# Patient Record
Sex: MALE | Race: WHITE
[De-identification: names, ages, dates, MRNs, and addresses within clinical notes are randomized per-mention and may not be internally consistent; named-entity substitution may affect disease eponyms.]

---

## 2017-06-04 NOTE — ED
General Adult HPI





- General


Chief complaint: Recheck/Abnormal Lab/Rx


Stated complaint: Fall/weakness


Time Seen by Provider: 06/04/17 15:13


Source: patient, family, RN notes reviewed, old records reviewed


Mode of arrival: wheelchair


Limitations: physical limitation





- History of Present Illness


Initial comments: 





This is an 84-year-old male reevaluation.  Patient here today for evaluation of 

neurological issues at this time.  Urinary retention, back pain, patient denies 

history of back pain or trauma.  Family states patient has had recent weight 

loss


Intent.  No change in his bowel habits.  As far as diarrhea.  The patient has 

had loss of stool, loss of bowel.  The significant and occasional urinary 

retention.  Weakness and right leg, multiple falls but no specific trauma from 

falls and patient denies any pain.





- Related Data


 Home Medications











 Medication  Instructions  Recorded  Confirmed


 


Fluticasone/Salmeterol [Advair 1 puff INHALATION RT-BID 06/04/17 06/04/17





250-50 Diskus]   


 


Ipratropium-Albuterol Nebulize 3 ml INHALATION RT-QID PRN 06/04/17 06/04/17





[Duoneb 0.5 mg-3 mg/3 ml Soln]   











 Allergies











Allergy/AdvReac Type Severity Reaction Status Date / Time


 


No Known Allergies Allergy   Verified 06/04/17 16:23














Review of Systems


ROS Statement: 


Those systems with pertinent positive or pertinent negative responses have been 

documented in the HPI.





ROS Other: All systems not noted in ROS Statement are negative.





Past Medical History


Past Medical History: Asthma


Additional Past Medical History / Comment(s): chronic back problems, PUD


History of Any Multi-Drug Resistant Organisms: None Reported


Past Surgical History: Back Surgery


Past Psychological History: No Psychological Hx Reported


Smoking Status: Never smoker


Past Alcohol Use History: None Reported


Past Drug Use History: None Reported





General Exam


Limitations: physical limitation


General appearance: alert, in no apparent distress


Head exam: Present: atraumatic, normocephalic, normal inspection


Eye exam: Present: normal appearance, PERRL, EOMI.  Absent: scleral icterus, 

conjunctival injection, periorbital swelling


ENT exam: Present: normal exam, mucous membranes moist


Neck exam: Present: normal inspection.  Absent: tenderness, meningismus, 

lymphadenopathy


Respiratory exam: Present: normal lung sounds bilaterally.  Absent: respiratory 

distress, wheezes, rales, rhonchi, stridor


Cardiovascular Exam: Present: regular rate, normal rhythm, normal heart sounds.

  Absent: systolic murmur, diastolic murmur, rubs, gallop, clicks


GI/Abdominal exam: Present: soft, normal bowel sounds.  Absent: distended, 

tenderness, guarding, rebound, rigid


Extremities exam: Present: normal inspection, full ROM, normal capillary 

refill.  Absent: tenderness, pedal edema, joint swelling, calf tenderness


Back exam: Present: normal inspection


Neurological exam: Present: alert, oriented X3, CN II-XII intact


Psychiatric exam: Present: normal affect, normal mood


Skin exam: Present: warm, dry, intact, normal color.  Absent: rash





Course


 Vital Signs











  06/04/17 06/04/17





  14:21 17:00


 


Temperature 97.6 F 97.5 F L


 


Pulse Rate 68 63


 


Respiratory 20 18





Rate  


 


Blood Pressure 102/59 115/60


 


O2 Sat by Pulse 98 98





Oximetry  














EKG Findings





- EKG Comments:


EKG Findings:: EKG shows normal sinus rhythm at 61, , QRS 80, 





Medical Decision Making





- Medical Decision Making





84 male ER for evaluation of weakness, increasing falls, patient appears did 

have urinary retention, UTI and pyelonephritis, patient admitted for IV 

antibiotic treatment





- Lab Data


Result diagrams: 


 06/04/17 15:54





 06/04/17 15:54


 Lab Results











  06/04/17 06/04/17 06/04/17 Range/Units





  15:19 15:54 15:54 


 


WBC    11.5 H  (3.8-10.6)  k/uL


 


RBC    4.24 L  (4.30-5.90)  m/uL


 


Hgb    12.6 L  (13.0-17.5)  gm/dL


 


Hct    38.5 L  (39.0-53.0)  %


 


MCV    90.7  (80.0-100.0)  fL


 


MCH    29.7  (25.0-35.0)  pg


 


MCHC    32.8  (31.0-37.0)  g/dL


 


RDW    13.6  (11.5-15.5)  %


 


Plt Count    427  (150-450)  k/uL


 


Neutrophils %    81  %


 


Lymphocytes %    11  %


 


Monocytes %    5  %


 


Eosinophils %    2  %


 


Basophils %    1  %


 


Neutrophils #    9.3 H  (1.3-7.7)  k/uL


 


Lymphocytes #    1.3  (1.0-4.8)  k/uL


 


Monocytes #    0.5  (0-1.0)  k/uL


 


Eosinophils #    0.2  (0-0.7)  k/uL


 


Basophils #    0.1  (0-0.2)  k/uL


 


PT     (9.0-12.0)  sec


 


INR     (<1.1)  


 


APTT     (22.0-30.0)  sec


 


Sodium     (137-145)  mmol/L


 


Potassium     (3.5-5.1)  mmol/L


 


Chloride     ()  mmol/L


 


Carbon Dioxide     (22-30)  mmol/L


 


Anion Gap     mmol/L


 


BUN     (9-20)  mg/dL


 


Creatinine     (0.66-1.25)  mg/dL


 


Est GFR (MDRD) Af Amer     (>60 ml/min/1.73 sqM)  


 


Est GFR (MDRD) Non-Af     (>60 ml/min/1.73 sqM)  


 


Glucose     (74-99)  mg/dL


 


Calcium     (8.4-10.2)  mg/dL


 


Phosphorus     (2.5-4.5)  mg/dL


 


Magnesium     (1.6-2.3)  mg/dL


 


Total Bilirubin     (0.2-1.3)  mg/dL


 


AST     (17-59)  U/L


 


ALT     (21-72)  U/L


 


Alkaline Phosphatase     ()  U/L


 


Total Creatine Kinase   45 L   ()  U/L


 


CK-MB (CK-2)   1.3   (0.0-2.4)  ng/mL


 


CK-MB (CK-2) Rel Index   2.9   


 


Troponin I   <0.012   (0.000-0.034)  ng/mL


 


Total Protein     (6.3-8.2)  g/dL


 


Albumin     (3.5-5.0)  g/dL


 


Urine Color  Yellow    


 


Urine Appearance  Cloudy    (Clear)  


 


Urine pH  6.0    (5.0-8.0)  


 


Ur Specific Gravity  1.011    (1.001-1.035)  


 


Urine Protein  1+ H    (Negative)  


 


Urine Glucose (UA)  Negative    (Negative)  


 


Urine Ketones  Negative    (Negative)  


 


Urine Blood  Small H    (Negative)  


 


Urine Nitrite  Negative    (Negative)  


 


Urine Bilirubin  Negative    (Negative)  


 


Urine Urobilinogen  3.0    (<2.0)  mg/dL


 


Ur Leukocyte Esterase  Large H    (Negative)  


 


Urine RBC  10 H    (0-5)  /hpf


 


Urine WBC  >182 H    (0-5)  /hpf


 


Urine WBC Clumps  Many H    (None)  /hpf


 


Amorphous Sediment  Rare H    (None)  /hpf


 


Urine Bacteria  Many H    (None)  /hpf














  06/04/17 06/04/17 Range/Units





  15:54 15:54 


 


WBC    (3.8-10.6)  k/uL


 


RBC    (4.30-5.90)  m/uL


 


Hgb    (13.0-17.5)  gm/dL


 


Hct    (39.0-53.0)  %


 


MCV    (80.0-100.0)  fL


 


MCH    (25.0-35.0)  pg


 


MCHC    (31.0-37.0)  g/dL


 


RDW    (11.5-15.5)  %


 


Plt Count    (150-450)  k/uL


 


Neutrophils %    %


 


Lymphocytes %    %


 


Monocytes %    %


 


Eosinophils %    %


 


Basophils %    %


 


Neutrophils #    (1.3-7.7)  k/uL


 


Lymphocytes #    (1.0-4.8)  k/uL


 


Monocytes #    (0-1.0)  k/uL


 


Eosinophils #    (0-0.7)  k/uL


 


Basophils #    (0-0.2)  k/uL


 


PT   13.4 H  (9.0-12.0)  sec


 


INR   1.4  (<1.1)  


 


APTT   25.1  (22.0-30.0)  sec


 


Sodium  138   (137-145)  mmol/L


 


Potassium  4.2   (3.5-5.1)  mmol/L


 


Chloride  106   ()  mmol/L


 


Carbon Dioxide  26   (22-30)  mmol/L


 


Anion Gap  6   mmol/L


 


BUN  25 H   (9-20)  mg/dL


 


Creatinine  0.84   (0.66-1.25)  mg/dL


 


Est GFR (MDRD) Af Amer  >60   (>60 ml/min/1.73 sqM)  


 


Est GFR (MDRD) Non-Af  >60   (>60 ml/min/1.73 sqM)  


 


Glucose  91   (74-99)  mg/dL


 


Calcium  8.3 L   (8.4-10.2)  mg/dL


 


Phosphorus  2.5   (2.5-4.5)  mg/dL


 


Magnesium  1.8   (1.6-2.3)  mg/dL


 


Total Bilirubin  0.7   (0.2-1.3)  mg/dL


 


AST  34   (17-59)  U/L


 


ALT  49   (21-72)  U/L


 


Alkaline Phosphatase  83   ()  U/L


 


Total Creatine Kinase    ()  U/L


 


CK-MB (CK-2)    (0.0-2.4)  ng/mL


 


CK-MB (CK-2) Rel Index    


 


Troponin I    (0.000-0.034)  ng/mL


 


Total Protein  6.5   (6.3-8.2)  g/dL


 


Albumin  2.8 L   (3.5-5.0)  g/dL


 


Urine Color    


 


Urine Appearance    (Clear)  


 


Urine pH    (5.0-8.0)  


 


Ur Specific Gravity    (1.001-1.035)  


 


Urine Protein    (Negative)  


 


Urine Glucose (UA)    (Negative)  


 


Urine Ketones    (Negative)  


 


Urine Blood    (Negative)  


 


Urine Nitrite    (Negative)  


 


Urine Bilirubin    (Negative)  


 


Urine Urobilinogen    (<2.0)  mg/dL


 


Ur Leukocyte Esterase    (Negative)  


 


Urine RBC    (0-5)  /hpf


 


Urine WBC    (0-5)  /hpf


 


Urine WBC Clumps    (None)  /hpf


 


Amorphous Sediment    (None)  /hpf


 


Urine Bacteria    (None)  /hpf














- Radiology Data


Radiology results: report reviewed (CT abdomen and pelvis and lumbar spine 

shows likely L obstruction, urinary, kidney ultrasound ureter BLADDERS 

ULTRASOUND SHOWS NO DEFINITE MASS), image reviewed





Disposition


Clinical Impression: 


 UTI (urinary tract infection), Pyelonephritis





Disposition: ADMITTED AS IP TO THIS [facility]


Condition: Fair


Referrals: 


Nonstaff,Physician [REFERRING] - 1-2 days

## 2017-06-04 NOTE — CT
EXAMINATION TYPE: CT lumbar spine w con

 

DATE OF EXAM: 6/4/2017

 

COMPARISON: NONE

 

HISTORY: Weight loss and inability to urinate

 

CT DLP: 474.7 mGycm

Automated exposure control for dose reduction was used.

 

CONTRAST: 

CT scan of the lumbar is performed with IV Contrast, patient injected with 100 mL of Omnipaque 300.

 

Enhanced CT of the lumbar spine was performed.  Bone and soft tissue window settings are submitted as
 well as coronal and sagittal reconstructions. 

 

There is laminectomy defect at L3-L4. There is spondylolysis of L5 bilaterally. I see no compression 
fracture. There is severe narrowing of the L2-3 disc space with sclerosis and subchondral cystic paredes
ge. There is extensive spur formation anteriorly at L3-4. I see no focal bone destruction. There is n
o lumbar paraspinal mass. There are a few bilateral renal cortical cysts that measure up to 2.3 cm. T
he sacroiliac joints appear intact. I see no pathologic enhancement. There are posterior disc herniat
ions noted at L3-4 and L4-5 with encroachment on the spinal canal. L4-5 disc herniation is more to th
e right side. There is some spinal stenosis at L2-3 due to the spur formation and facet arthropathy.

IMPRESSION:

Spondylotic changes that are much worse at L2-3. Previous surgery. Moderate sized posterior disc inna
iations at L3-4 L4-5. No fracture. L5 spondylolysis. There is lateral recess stenosis and relative sp
inal stenosis at L5-S1 due to facet arthropathy. There is mild spinal stenosis also at L2-3.

## 2017-06-04 NOTE — US
EXAMINATION TYPE: US renals and bladder

 

DATE OF EXAM: 6/4/2017

 

COMPARISON: NONE

 

CLINICAL HISTORY: Pain.

 

EXAM MEASUREMENTS:

 

Right Kidney:  12.1 x 4.4 x 4.9 cm

Left Kidney: 12.6 x 5.0 x 4.8 cm

 

 

 

Right Kidney: Dilated right renal pelvis possible hydronephrosis, 1 anechoic area mid pole likely cys
t, 1 complex area inferior pole with cystic and solid components  

Left Kidney: Echogenic area seen mid pole with twinkle artifact ?nephrolithiasis  

Bladder: appears wnl

**Bilateral Jets seen: yes

 

Possible evidence for hydronephrosis on the right renal. Appears there may be small nephrolithiasis i
n the left renal.  Two areas are identified on the right renal one appearing anechoic the other compl
ex with cystic and solid components.  The urinary bladder is anechoic.  Bilateral ureteral jets are s
een.

 

 

 

IMPRESSION:

There is a 1.9 cm complex cyst on the lower pole of the right kidney. Urinary bladder is sonolucent. 
There is no evidence of renal obstruction. There are bilateral ureteral jets. Nonobstructing left riana
al calculus. I do not see definite solid suspicious renal mass and I think that this could be followe
d conservatively with repeat ultrasound in 6 months. The heterogeneous enhancement pattern seen on th
e CT scan today in the renal cortex is suggestive of pyelonephritis.

## 2017-06-04 NOTE — CT
EXAMINATION TYPE: CT abdomen pelvis w con

 

DATE OF EXAM: 6/4/2017 5:54 PM

 

COMPARISON: NONE

 

HISTORY: Weight loss and inability to urinate

 

CT DLP: 474.7 mGycm

Automated exposure control for dose reduction was used.

 

TECHNIQUE:  Helical acquisition of images was performed from the lung bases through the pelvis.

 

CONTRAST: 

Performed without Oral Contrast and with IV Contrast, patient injected with 100 mL of Omnipaque 300.

 

FINDINGS: 

 

The lung bases are clear. There is no pleural effusion. There is no pericardial effusion.

 

Liver spleen pancreas gallbladder appear normal. Bile ducts are not dilated. There is no adrenal mass
. There are multiple rounded low-density areas in both kidneys that measure up to 2.5 cm related to m
ultiple cortical cysts. There is an intermediate low-density 2.5 cm area on the lower pole left kidne
y. There is slight decreased cortical enhancement on the lower pole right kidney and lower lateral po
le left kidney. There is no hydronephrosis. There is no retroperitoneal adenopathy. There is no ascit
es. I see no intestinal wall thickening. There are no dilated loops.

 

There is a dilated urinary bladder that measures 15 cm in height. There are prostatic implants noted.
 There are changes in the lumbar spine described in the CT lumbar spine report today.

 

I see no pelvic mass.:

 

IMPRESSION: 

DILATED URINARY BLADDER CONSISTENT WITH BLADDER OUTLET OBSTRUCTION. HETEROGENEOUS RENAL ENHANCEMENT I
N THE CORTEX OF THE LOWER POLE OF BOTH KIDNEYS AND TO A LESSER EXTENT THE UPPER POLES OF BOTH KIDNEYS
 BUT COULD RELATE TO PYELONEPHRITIS. MULTIPLE BILATERAL RENAL CORTICAL CYSTS. A SOLID RENAL MASS GUILLE
OT BE ENTIRELY EXCLUDED.

 

BILATERAL RENAL ULTRASOUND IS RECOMMENDED TO CONFIRM RENAL CYSTS AND EXCLUDE A SOLID RENAL MASS.

## 2017-06-06 NOTE — XR
EXAMINATION TYPE: XR chest 2V

 

DATE OF EXAM: 6/6/2017

 

COMPARISON: CT abdomen pelvis 6/4/2017

 

HISTORY: Rehabilitation placement, extended-care facility placement

 

TECHNIQUE:  Frontal and lateral views of the chest are obtained on 3 images.

 

FINDINGS:  There is no focal air space opacity, pleural effusion, or pneumothorax seen.  The cardiac 
silhouette size is within normal limits. Scattered granulomas are present. Prominent lung lines to be
 indicative of COPD. The osseous structures are intact.

 

IMPRESSION:  No acute cardiopulmonary process. Probable old granulomatous disease.

## 2017-06-06 NOTE — PN
DATE OF SERVICE: 06/06/2017



This 84 -year-old gentleman who was admitted with UTI with possible 

sepsis also had change in mental status. The patient was confused and 

complains of weakness and falls, stress incontinence and other issues 

prior to admission.  Currently the patient is feeling better.  Still 

appetite is very poor.   White count is elevated. Yesterday's white 

count was 11.5, today it is 13.9. The urine culture showed gram 

negative bacilli.    Final report is pending. PSA was negative.  



PAST MEDICAL HISTORY: Reviewed. 



REVIEW OF SYSTEMS: 

CARDIOVASCULAR:  No angina or palpitations. 

RESPIRATORY: As mentioned earlier. 

GI: No nausea or vomiting. 

: No dysuria. Nervous system: No numbness or weakness.  



Current medications are reviewed and include: 

1. DuoNeb q.i.d. and p.r.n. 

2. Symbicort 160/4.5.

3. Rocephin.

4. Lovenox.

5. Paxil. 



PHYSICAL EXAMINATION: Patient is alert and oriented times two.  Pulse 

62, blood pressure 111/54 mg.  respiratory  rate 16.  Temperature 

97.7.   Pulse ox 97% on room air.   

HEENT: Conjunctivae normal. 

NECK: No jugular venous distention. 

CARDIOVASCULAR: S1, S2 muffled.

RESPIRATORY: Breath sounds diminished at the bases.   Scattered 

rhonchi and crackles.  

ABDOMEN: The abdomen is soft and nontender. No mass palpable. Legs: No 

edema. No swelling. Nervous system: Higher functions as mentioned 

earlier.  Moves all four limbs. Mild diffuse weakness.  

LYMPHATICS: No lymph nodes palpable in the neck, axillae or groin.  

SKIN: No ulcer, rash, bleeding. 



LABS: WBC 13.9, hemoglobin 10.6, sodium 138.  Potassium 4.1.  Calcium 

8.  



ASSESSMENT:

1. Urinary tract infection with possible sepsis. 

2. Change in mental status, metabolic encephalopathy, secondary to 

urinary tract infection. 

3. Generalized gait dysfunction and asthenia, possibly secondary to 

sepsis.  

4. Increased WBC. 

5. Anemia, normocytic anemia of chronic disease. 

6. Dehydration, present on admission with diminished p.o. intake. 

7. History of asthma. 

8. History of degenerative joint disease. 

9. History of back surgery. 

10. Mild protein calorie malnutrition with a BMI of 19. 

11. Full code. 



RECOMMENDATIONS AND DISCUSSION:  In this 84 -year-old gentleman who 

presented with multiple complex medical issues, we will monitor the 

patient closely, continue the current medications, continue with 

symptomatic treatment. I would recommend broad spectrum IV 

antibiotics. Follow the cultures.  Bronchodilators. DVT prophylaxis. 

Otherwise, PT, OT evaluation.  Possible ECF rehab.  Guarded prognosis 

because of the multiple complex medical issues. Further 

recommendations to follow. Repeat labs are noted. See orders for 

further details. The patient also apparently had poor score on the 

dementia testing, I would recommend to treat the infection and control 

the septic process and repeat dementia evaluation later.  Otherwise, 

continue to monitor. Further recommendations to follow.  Prognosis 

guarded.  Discussed with the family. Discussed with the patient.

## 2017-06-07 NOTE — PN
DATE OF SERVICE: 06/07/2017



This 84-year-old gentleman who was admitted with urinary tract 

infection with sepsis also had ESBL E. coli from the culture. No chest 

pain or palpitation. No fever.  Patient complained of tiredness, 

weakness.  The patient mildly confused. 



On exam, pulse 61, blood pressure 107/59, respiratory  rate 16, 

temperature  97.9, pulse ox 92% on room air.  

HEENT: Conjunctivae normal. 

NECK: No jugular venous distention.

CARDIOVASCULAR: S1, S2 muffled.  

RESPIRATORY: Breath sounds diminished at the bases. A few scattered 

rhonchi.  No crackles.  

ABDOMEN: Soft, nontender. Legs:  No edema. No swelling. 

CENTRAL NERVOUS SYSTEM: Mild diffuse weakness. 



LABS: WBC 10.5, hemoglobin 10.8. Culture noted. 



ASSESSMENT:

1. Urinary tract infection with possible sepsis ESBL Escherichia coli.

2. Change in mental status, metabolic encephalopathy sepsis secondary 

to urinary tract infection.  

3. Generalized gait dysfunction, asthenia,  possibly secondary to 

sepsis.  

4. Increased WBC. 

5. Anemia, normocytic anemia of chronic disease. 

6. Dehydration, present on admission with diminished p.o. intake. 

7. History of asthma. 

8. History of degenerative joint disease. 

9. History of back surgery. 

10. Mild protein calorie malnutrition with body mass index 19. 

11. FULL CODE. 



RECOMMENDATIONS AND DISCUSSION. Continue the current medications, 

continue with monitoring, symptomatic treatment.  Otherwise at this 

time, I recommend continue current medications, continue symptomatic 

treatment, dietary evaluation, continue with antibiotics, 

bronchodilators.  Infectious disease evaluation, Ertapenem has been 

initiated. Further recommendations to follow.

## 2017-06-08 NOTE — CONS
DATE OF CONSULTATION:  06/08/2017 



REASON FOR CONSULTATION: Urinary retention. 



The patient is an 84-year-old male admitted on 6/6 for treatment of a 

urinary tract infection and back pain. A CT scan of the lumbosacral 

spine showed degenerative changes but no spinal cord compression. CT 

scan on 6/4 showed evidence of a complex 1.9 cm cyst in the lower pole 

of the right kidney, a non-obstructive left renal calculus and a 

distended bladder. A renal ultrasound performed the same day showed no 

other significant abnormality. A urine culture from 6/4 grew E coli 

which was resistant to multiple antibiotics. Patient was initially 

started on Rocephin and has been switched to Ertapenem. A bladder scan 

was performed earlier today and reportedly showed over 700 mL. The 

patient has a history of difficulty in placement of a Anderson catheter, 

and urologic consultation was requested.  



The patient is not a particularly accurate historian and is somewhat 

confused at times. He apparently has a history of prostate cancer 

treated with brachytherapy 20 years ago. One or 2 years ago he had 

back surgery and there was difficulty in placement of a Anderson catheter 

at that time. Patient was incontinent of urine prior to admission. He 

did have malodorous urine, which has improved since admission. He has 

had no recent gross hematuria.  



Patient's past medical history is significant in regard to asthma and 

chronic back pain.  



Current medications include: 

1. DuoNeb. 

2. Symbicort. 

3. Aricept. 

4. Lovenox. 

5. Protonix. 

6. Paxil. 

7. Ertapenem. 



The patient has previously undergone surgery on his lower back. 



REVIEW OF SYSTEMS: Significant mainly in regard to the above. Patient 

denies any shortness of breath or abdominal pain at the present time.  



Physical exam reveals an elderly male who has a temperature of 99.5, 

blood pressure 104/57.  

HEENT: No supraclavicular or cervical adenopathy. 

CHEST: Breathing is unlabored. 

ABDOMEN: There is some slight fullness in the suprapubic area to 

palpation. No pain in this region.  

GENITALIA: Patient is in diapers and is incontinent. Penis is 

uncircumcised. There is no urethral discharge or blood noted at the 

urethral meatus. Both testicles are descended. No hernia is present.  

RECTAL: A large external hemorrhoid is present. Prostate is relatively 

flat and consistent with previous radiation therapy. Anal sphincter 

tone is reduced. There is no evidence of fecal impaction.  



Laboratory evaluation on admission included a white blood count of 

11,500. BUN 25, creatinine 0.84. White blood count today was 12,300. 

BUN 15, creatinine 0.86.  



Immediately following my examination I attempted to pass a 16 Qatari 

Anderson catheter using sterile technique, but this met with obstruction 

in the region of the bulbomembranous junction. A 12 Qatari Coude 

catheter was also tried, without success. A 5 Qatari filiform was 

passed through the urethra and into the bladder, and a urethral 

stricture was then dilated from 10 to 14 Qatari using followers. A 

0.035 Glidewire was passed through a follower and into the bladder. 

The 12 Qatari Coude catheter was modified with a slit at its tip and 

advanced over the Glidewire and into the bladder. Clear urine drained 

from the bladder.  



IMPRESSION: 

1. Incomplete bladder emptying secondary to urethral stricture. 

2. Recent multi-drug-resistant Escherichia coli urinary tract 

infection versus contaminated urine related to chronic incontinence.  



RECOMMENDATION: Patient's catheter should be left in place for the 

time being until his infection has been treated. A repeat urine 

culture will be obtained from urine obtained through the catheter.  



Thank you for allowing me to participate in the care of this gentleman.

## 2017-06-08 NOTE — CONS
DATE OF CONSULTATION:  



DATE OF SERVICE:  06/07/2017



REASON FOR CONSULTATION: ESBL Escherichia coli urinary tract infection. 



HISTORY OF PRESENT ILLNESS: The patient is an 84-year-old  

male who has been admitted to [facility] and 

presented on 6/4/2017 predominantly with neurological symptoms. The 

patient apparently did have a fall on Saturday. Prior to that, has 

been complaining of some back pain. The patient also noticed to have 

bowel incontinence and as well as foul smelling urine. With these 

symptoms, the patient has been evaluated by the ER physician. The 

patient did have CT of the abdomen and pelvis, which did show 

possibility of distention of the bladder and possible renal cysts. A 

renal ultrasound confirmed those cysts, one of them was complex. 

Patient did have a UA that was significantly positive.  He has been 

treated with Rocephin with the urine culture coming back positive for 

an ESBL E. coli. Hence ID was consulted for further recommendation. 

Patient is not a very good historian when asked she says everything is 

right okay with me and denies having any significant pain or shortness 

of breath or any cough or any abdominal pain and no diarrhea. Overall 

history remains to be limited so most of the information has been 

obtained from review of the chart.  



REVIEW OF SYSTEMS:  Could not be reliably obtained. The positives have 

been mentioned in the HPI.  



PAST MEDICAL HISTORY: Significant for asthma, chronic back pain and 

possibility of dementia.  



PAST SURGICAL HISTORY: Back surgery. 



SOCIAL HISTORY: No history of smoking, drinking or drug use. 



FAMILY HISTORY: Positive for MI. 



ALLERGIES: No known drug allergies. 



Medications currently include the patient is on DuoNeb, Symbicort, 

Aricept Lovenox, folic acid, Theragran, Protonix, Paxil and vitamin 

B1.  



On examination, blood pressure is 125/65 with a pulse of 61, 

temperature 97.5.  He is 97% on room air. General description is an 

elderly male, lying in bed in no distress. No tachypnea or accessory 

muscle of respiration use.  

HEENT examination shows pallor. No scleral icterus. Oral mucosal 

membranes dry.  

NECK: Trachea central, no thyromegaly. 

LUNGS: Unlabored breathing. Clear to auscultation anteriorly. No 

wheeze or crackle.  

HEART: S1, S2. Regular rate and rhythm. 

ABDOMEN:  Soft, no tenderness. No guarding or rigidity. 

EXTREMITIES: No edema of the feet. 

SKIN EXAMINATION:  No rash or mass palpable. 

NEUROLOGICAL: The patient is awake, alert, oriented x3. Mood and 

affect normal.  



LABS: Hemoglobin 10.8, white count 10.7.  Admission white count is 

11.5.  BUN of 14 with creatinine 0.82. UA admission was large 

leukocyte esterase with more than 182 WBCs. Blood culture has been 

negative. Urine treated with an ESBL E. coli. CT and ultrasound report 

as mentioned above.  



DIAGNOSTIC IMPRESSION AND PLAN: Patient with ESBL Escherichia coli 

positive urine culture in a patient who did have significant retention 

of urine with cloudy, smelly urine on presentation likely symptomatic 

urinary tract infection.  patient has not  received antibiotics or 

exposure that has put him a risk ESBL infection.  



PLAN: 

1. We will repeat a clean catch UA and cultures before commiting  to 

long term IV antibiotics.  

2. Will discontinue the Rocephin, start the patient on Invanz 1 gram 

IV daily while awaiting for repeat urine culture to finalize.  

3. Will follow up on his clinical condition and cultures to further 

adjust the medication if needed.  



Thank you for this consultation.  Will follow this patient along with 

you.  



ZAYNAB

## 2017-06-08 NOTE — PN
DATE OF SERVICE: 06/08/2017



This 84-year-old gentleman admitted with UTI with ESBL E. coli, also 

had significant post void residual indicating urinary outlet 

obstruction. The patient is also running some fever. The patient is on 

ertapenem. PICC line has been recommended by Dr. John of infectious 

disease. No chest pain or palpitations. 



On exam, alert and oriented x3. Pulse 67, blood pressure 180/58, 

respirations 19, temperature 98.1, T-max 100 degrees, pulse ox 96% on 

room air. 

HEENT: Conjunctivae normal.

NECK: No jugular venous distension.

CARDIOVASCULAR: S1 and S2 muffled.

RESPIRATORY: Breath sounds diminished in the bases. No rhonchi. No 

crackles.  

ABDOMEN: Soft, nontender.

LEGS: No edema.

NERVOUS SYSTEM: Nonfocal.



LABS: WBC 12.6, hemoglobin is 11.2. UA noted. 



ASSESSMENT:

1. Urinary tract infection, possibly extended-spectrum beta-lactamase 

Escherichia coli.  

2. Urinary outlet obstruction with a high residual volume. 

3. Change in mental status, metabolic encephalopathy and sepsis 

secondary to urinary tract infection, present on admission.  

4. Generalized gait dysfunction asthenia, possibly secondary to sepsis. 

5. Increased WBC. 

6. Anemia, normocytic anemia of chronic disease. 

7. Dehydration, present on admission with diminished p.o. intake. 

8. History of asthma. 

9. Asthma, chronic intermittent. 

10. History of degenerative joint disease. 

11. History of back surgery. 

12. Mild protein-calorie malnutrition with a body mass index of 19. 

13. FULL CODE.



RECOMMENDATIONS AND DISCUSSION: In this 84-year-old gentleman who 

presented with multiple complex medical issues, we will monitor the 

patient closely. Continue the current medications, continue with 

symptomatic treatment. Otherwise, continue with antibiotics. Possible 

PICC line. PT and OT evaluation. Possible ECF rehab. Guarded 

prognosis. Urology evaluation. Further recommendations to follow.

## 2017-06-09 NOTE — DS
DATE OF ADMISSION:   06/06/2017

DATE OF DISCHARGE:   



DATE OF SERVICE: 06/09/2017 



FINAL DIAGNOSES: 

1. Urinary tract infection with ESBL Escherichia coli with sepsis, 

present on admission.  

2. Urinary outlet obstruction with possible urethral stricture with 

high-residual volume on Anderson catheter.  

3. Change in mental status, metabolic encephalopathy with sepsis, 

present on admission, secondary to urinary tract infection.  

4. Generalized gait dysfunction, asthenia. 

5. Increased white count. 

6. Anemia, normocytic; anemia of chronic disease. 

7. Dehydration, present on admission, with diminished oral intake. 

8. History of chronic intermittent asthma. 

9. History of degenerative joint disease. 

10. History of back surgery. 

11. History of mild protein-calorie malnutrition with a body mass 

index of 19.  

12. Status post PICC line.

13. FULL CODE. 



DISCHARGE DISPOSITION:  The patient will be discharged in stable 

condition with guarded prognosis. Total time taken 35 minutes.  



HISTORY OF PRESENT ILLNESS: This 84-year-old gentleman with a past 

medical history of multiple medical problems was admitted with UTI 

with ESBL E coli. The patient also had features of urinary outlet 

obstruction. Patient had change in mental status. He was treated with 

antibiotics. ESBL was grown from the culture. A PICC line was 

inserted. Dr. John recommended IV antibiotics and the patient 

improved significantly. Multiple consultants, including Dr. Mullen and 

Dr. Gamez, also saw the patient; recommended outpatient followup.  



On exam, vitals are stable. 

CARDIOVASCULAR SYSTEM: S1, S2 muffled. 

ABDOMEN: Soft. 

NERVOUS SYSTEM: No focal deficit. 



Dr. Mullen recommended that the catheter remain in place at least one 

week. Bladder scan once the catheter is removed, and periodically for 

the next week.  



Discharge advice and medications are: 

1. Diet is cardiac. 

2. Follow up with Dr. Black in 2 to 3 days after discharge from [facility]. 

3. Follow up with Dr. Motley and Dr. Mullen as recommended. 

4. Symbicort 160/4.5 two puffs b.i.d. 

5. Invanz 1 gram IV daily per Dr. John. 

6. Folic acid 1 mg daily. 

7. Albuterol Atrovent updrafts q.i.d. and q.4 p.r.n. 

8. Multivitamins 1 p.o. daily. 

9. Protonix 40 mg daily. 

10. Paxil 10 mg p.o. daily. 

11. Thiamine 100 mg p.o. daily. 



Once again, the patient will be discharged in stable condition with 

guarded prognosis.

## 2017-06-09 NOTE — PN
DATE OF SERVICE: 06/09/2017



REASON FOR FOLLOWUP: ESBL E coli urinary tract infection. 



INTERVAL HISTORY: The patient is afebrile. He is feeling better, 

breathing comfortably. Denies significant chest pain or cough. No 

abdominal pain. Did have Anderson catheter placed by Urology yesterday. 

On examination, blood pressure 105/60 with a pulse of 71, temperature 

97.7. He is 94% on room air. General description is an elderly male 

lying in bed in no distress.  

RESPIRATORY SYSTEM: Unlabored breathing. Clear to auscultation 

anteriorly.  

HEART: S1, S2. Regular rate and rhythm. 

ABDOMEN: Soft. No tenderness. 



LABS: Hemoglobin 9.3, white count 14. 9 with BUN of 15, creatinine 

0.83.  



DIAGNOSTIC IMPRESSION AND PLAN: Patient has ESBL urinary tract 

infection (      ) for which the patient is continued on IV Invanz for 

another 12 days with outpatient followup. Plan of care discussed with 

the attending. Continue supportive care.

## 2017-06-09 NOTE — P.PN
Progress Note - Text





The patient is afebrile.  He says that he has been comfortable with his Anderson 

catheter.  Urine draining from the catheter is clear with some sediment.  Urine 

culture was obtained at the time of placement of the catheter yesterday but the 

results are pending.  The patient is scheduled to undergo PICC line placement 

for IV antibiotics following discharge.  It is anticipated that he will be 

going to a rehab unit.





Impression: Urethral stricture and urinary tract infection-post urethral 

dilation and placement of Anderson catheter.





Recommendation: The patient's catheter should be left in place for 1 week.  The 

patient should have bladder scans done once his catheter is removed and then 

periodically for the next week.  I would like to see the patient back in the 

office within 1 week following removal of the catheter as it is likely he will 

require periodic urethral dilations due to his stricture.  Have discussed this 

with the patient's daughter.

## 2017-06-09 NOTE — PN
DATE OF SERVICE: 06/08/2017



REASON FOR FOLLOWUP: ESBL C. coli urinary tract infection.



INTERVAL HISTORY: The patient is afebrile. The patient is afebrile. 

The patient did have evidence of urinary retention, high postvoid 

residual, though the patient denies significant symptoms. Denies any 

chest pain. No cough. No abdominal pain or any diarrhea. 



On examination, blood pressure 104/57 with a pulse of 61, temperature 

99.5. He is 98% on room air. General description is an elderly male, 

lying in bed in no distress.  

RESPIRATORY SYSTEM: Unlabored breathing. Clear to auscultation 

anteriorly.  

HEART: S1, S2. Regular rate and rhythm. 

ABDOMEN: Soft. No tenderness. 



LABS: Hemoglobin is 11.1, white count 12.2 with a BUN of 15, 

creatinine 0.86.  



DIAGNOSTIC IMPRESSION ANDP TAE: Patient with an extended-spectrum 

beta-lactamase Escherichia coli urinary tract infection and 

complicated urinary tract infection. Patient did have evidence of 

urinary retention, positive benign prostatic hypertrophy. At this 

time, will continue the patient on Invanz 1 g daily. Will obtain a 

PICC line, as patient is likely to continue with outpatient antibiotic 

therapy. Plan discussed in detail with the patient's family.  



ZAYNAB

## 2017-06-20 NOTE — IR
EXAMINATION TYPE: IR cvc insert >=5 years

 

DATE OF EXAM: 6/20/2017

 

COMPARISON: NONE

 

CLINICAL HISTORY: Infection Needs long-term intravenous access for antibiotics.

 

PROCEDURE:

After informed consent, the skin overlying the upper extremity vein was localized with ultrasound and
 noted to be compressible and patent.  An ultrasound image was obtained and submitted on the patient'
s chart.  The overlying skin was prepped and draped and Lidocaine was used for local anesthesia.  A s
kin nick was made with a scalpel.  Access was gained to the vein under ultrasound guidance with a 21 
gauge needle and a 0.018 inch wire was advanced.  Access site was dilated with Peel-Away sheath and c
atheter tailored to the appropriate length and advanced such that the distal tip is at the cavoatrial
 junction.  Spot image was obtained verifying placement.  Catheter was fixed to the skin with suture 
and a sterile dressing was placed following hemostasis.  Catheter was aspirated and flushed with sali
ne.  Patient was discharged in stable condition without complication. Maximal barrier technique is ut
ilized.  Ultrasound image is documented on the chart. Ultrasound used with sterile technique. 

 

Fluoro time and fluoroscopic images submitted to document procedure: 94 intraoperative C-arm images, 
0.3 minutes fluoroscopy time

 

IMPRESSION: STATUS POST ULTRASOUND AND FLUOROSCOPIC GUIDED PICC LINE PLACEMENT, READY FOR USE.  THIS 
PROCEDURE WAS PERFORMED BY THE UNDERSIGNED.

## 2017-07-10 ENCOUNTER — HOSPITAL ENCOUNTER (OUTPATIENT)
Dept: HOSPITAL 47 - LABWHC1 | Age: 82
Discharge: HOME | End: 2017-07-10
Payer: MEDICARE

## 2017-07-10 DIAGNOSIS — R53.83: ICD-10-CM

## 2017-07-10 DIAGNOSIS — Z01.810: Primary | ICD-10-CM

## 2017-07-10 DIAGNOSIS — Z79.899: ICD-10-CM

## 2017-07-10 DIAGNOSIS — Z01.812: ICD-10-CM

## 2017-07-10 DIAGNOSIS — N35.014: ICD-10-CM

## 2017-07-10 LAB
ANION GAP SERPL CALC-SCNC: 10 MMOL/L
BASOPHILS # BLD AUTO: 0.1 K/UL (ref 0–0.2)
BASOPHILS NFR BLD AUTO: 1 %
BUN SERPL-SCNC: 20 MG/DL (ref 9–20)
CALCIUM SPEC-MCNC: 8.9 MG/DL (ref 8.4–10.2)
CH: 29.7
CHCM: 33.2
CHLORIDE SERPL-SCNC: 109 MMOL/L (ref 98–107)
CO2 SERPL-SCNC: 25 MMOL/L (ref 22–30)
EOSINOPHIL # BLD AUTO: 0.4 K/UL (ref 0–0.7)
EOSINOPHIL NFR BLD AUTO: 6 %
ERYTHROCYTE [DISTWIDTH] IN BLOOD BY AUTOMATED COUNT: 4.04 M/UL (ref 4.3–5.9)
ERYTHROCYTE [DISTWIDTH] IN BLOOD: 14.7 % (ref 11.5–15.5)
GLUCOSE SERPL-MCNC: 102 MG/DL (ref 74–99)
HCT VFR BLD AUTO: 36.3 % (ref 39–53)
HDW: 2.96
HGB BLD-MCNC: 12.2 GM/DL (ref 13–17.5)
LUC NFR BLD AUTO: 2 %
LYMPHOCYTES # SPEC AUTO: 1.2 K/UL (ref 1–4.8)
LYMPHOCYTES NFR SPEC AUTO: 19 %
MCH RBC QN AUTO: 30.2 PG (ref 25–35)
MCHC RBC AUTO-ENTMCNC: 33.6 G/DL (ref 31–37)
MCV RBC AUTO: 89.9 FL (ref 80–100)
MONOCYTES # BLD AUTO: 0.5 K/UL (ref 0–1)
MONOCYTES NFR BLD AUTO: 7 %
NEUTROPHILS # BLD AUTO: 4.3 K/UL (ref 1.3–7.7)
NEUTROPHILS NFR BLD AUTO: 65 %
NON-AFRICAN AMERICAN GFR(MDRD): >60
POTASSIUM SERPL-SCNC: 4.1 MMOL/L (ref 3.5–5.1)
SODIUM SERPL-SCNC: 144 MMOL/L (ref 137–145)
WBC # BLD AUTO: 0.13 10*3/UL
WBC # BLD AUTO: 6.6 K/UL (ref 3.8–10.6)
WBC (PEROX): 7.2

## 2017-07-10 PROCEDURE — 85025 COMPLETE CBC W/AUTO DIFF WBC: CPT

## 2017-07-10 PROCEDURE — 80048 BASIC METABOLIC PNL TOTAL CA: CPT

## 2017-07-12 ENCOUNTER — HOSPITAL ENCOUNTER (OUTPATIENT)
Dept: HOSPITAL 47 - OR | Age: 82
Discharge: NURSING, REHAB OR LONG-TERM CARE | End: 2017-07-12
Payer: MEDICARE

## 2017-07-12 VITALS — DIASTOLIC BLOOD PRESSURE: 56 MMHG | RESPIRATION RATE: 16 BRPM | SYSTOLIC BLOOD PRESSURE: 129 MMHG

## 2017-07-12 VITALS — TEMPERATURE: 97.7 F

## 2017-07-12 VITALS — HEART RATE: 43 BPM

## 2017-07-12 VITALS — BODY MASS INDEX: 19 KG/M2

## 2017-07-12 DIAGNOSIS — J45.909: ICD-10-CM

## 2017-07-12 DIAGNOSIS — R33.9: ICD-10-CM

## 2017-07-12 DIAGNOSIS — M21.371: ICD-10-CM

## 2017-07-12 DIAGNOSIS — Z79.899: ICD-10-CM

## 2017-07-12 DIAGNOSIS — Z92.3: ICD-10-CM

## 2017-07-12 DIAGNOSIS — N35.014: Primary | ICD-10-CM

## 2017-07-12 DIAGNOSIS — Z79.51: ICD-10-CM

## 2017-07-12 DIAGNOSIS — Z85.46: ICD-10-CM

## 2017-07-12 DIAGNOSIS — Z87.440: ICD-10-CM

## 2017-07-12 PROCEDURE — 52281 CYSTOSCOPY AND TREATMENT: CPT

## 2017-07-12 NOTE — P.OP
Date of Procedure: 07/12/17


Preoperative Diagnosis: 


Incomplete bladder emptying secondary to urethral stricture


Postoperative Diagnosis: 


Incomplete bladder emptying secondary to urethral stricture and/or bladder neck 

contracture


Procedure(s) Performed: 


Cystoscopy with dilation of urethral stricture using filiforms and followers 

and placement of Anderson catheter


Implants: 





Anesthesia: MAC


Pathology: none sent


Disposition: same day


Indications for Procedure: 


The patient is an 84-year-old male with a history of prostate cancer treated 

with brachytherapy.  He has a history of incomplete bladder emptying and has 

required urethral dilations in the past.  One month ago he developed urinary 

retention and it was only with difficulty that a 14-[place]izen [place] Anderson catheter could 

be placed following dilation of urethral stricture or bladder neck contracture 

using filiforms and followers.  Repeat dilation under anesthesia is planned


Operative Findings: 


The patient was taken to the operating suite where adequate intravenous 

sedation was given.  Patient was placed in the dorsal lithotomy position with 

his legs suspended from padded Franklyn stirrups.  The genitalia was prepped with 

Betadine soap painted with Betadine solution and draped in a sterile fashion.  

The penile urethra was traversed under direct vision using the 17-Citizen [place] sheath 

and 30 lens.  Anterior urethra was free of inflammatory lesion tumor and 

stricture.  It was impossible to advance the cystoscope through the region of 

the membranous urethra. A 5 Fr filiform was passed through the urethra and into 

the bladder.  The urethra was then dilated from [id number] using successive 

followers.  It was only with difficulty that the 18-Citizen [place] follower could be 

advanced.  Cystoscopy was then performed using the 17-[place]izen [place] sheath.  The 

patient appeared to have narrowing in the region of the membranous urethra as 

well as the bladder neck.  The bladder was examined.  Both ureteral orifice 

ease were normal location and configuration and effluxed clear urine.  The 

bladder was heavily trabeculated but was free of tumor foreign body and 

diverticulum.  A 0.035 straight Glidewire was advanced through the cystoscope 

and the cystoscope was withdrawn leaving the Glidewire in place.  I initially 

attempted to advance a 16-Citizen [place] Anderson catheter over the Glidewire but this 

proved impossible.  Eventually a 16-[place]izen [place] coud catheter which had been 

modified with a slit at its tip was advanced over the Glidewire and into the 

bladder.  The catheter was then connected to gravity drainage





The patient tolerated procedure well and left the operative room awake and in 

satisfactory condition. The narrowing in the region of the prostate may be 

related to either a urethral stricture and/or bladder neck contracture related 

to previous radiation therapy. The catheter will be removed on 7/17 and the 

patient will be seen back in follow-up 1 week later.  The patient is currently 

on Cipro and this will be continued for another for 5 days.


Description of Procedure:

## 2017-08-16 ENCOUNTER — HOSPITAL ENCOUNTER (INPATIENT)
Dept: HOSPITAL 47 - EC | Age: 82
LOS: 6 days | Discharge: NURSING, REHAB OR LONG-TERM CARE | DRG: 64 | End: 2017-08-22
Attending: HOSPITALIST | Admitting: HOSPITALIST
Payer: MEDICARE

## 2017-08-16 VITALS — BODY MASS INDEX: 18.6 KG/M2

## 2017-08-16 DIAGNOSIS — R29.810: ICD-10-CM

## 2017-08-16 DIAGNOSIS — E87.1: ICD-10-CM

## 2017-08-16 DIAGNOSIS — Z66: ICD-10-CM

## 2017-08-16 DIAGNOSIS — R26.9: ICD-10-CM

## 2017-08-16 DIAGNOSIS — E43: ICD-10-CM

## 2017-08-16 DIAGNOSIS — Z79.899: ICD-10-CM

## 2017-08-16 DIAGNOSIS — M54.9: ICD-10-CM

## 2017-08-16 DIAGNOSIS — M19.90: ICD-10-CM

## 2017-08-16 DIAGNOSIS — E86.0: ICD-10-CM

## 2017-08-16 DIAGNOSIS — J44.9: ICD-10-CM

## 2017-08-16 DIAGNOSIS — Z85.46: ICD-10-CM

## 2017-08-16 DIAGNOSIS — F03.90: ICD-10-CM

## 2017-08-16 DIAGNOSIS — Z82.49: ICD-10-CM

## 2017-08-16 DIAGNOSIS — D63.8: ICD-10-CM

## 2017-08-16 DIAGNOSIS — G81.94: ICD-10-CM

## 2017-08-16 DIAGNOSIS — J45.30: ICD-10-CM

## 2017-08-16 DIAGNOSIS — M21.371: ICD-10-CM

## 2017-08-16 DIAGNOSIS — D32.0: ICD-10-CM

## 2017-08-16 DIAGNOSIS — D50.9: ICD-10-CM

## 2017-08-16 DIAGNOSIS — J32.9: ICD-10-CM

## 2017-08-16 DIAGNOSIS — R32: ICD-10-CM

## 2017-08-16 DIAGNOSIS — G89.29: ICD-10-CM

## 2017-08-16 DIAGNOSIS — M06.9: ICD-10-CM

## 2017-08-16 DIAGNOSIS — I63.9: Primary | ICD-10-CM

## 2017-08-16 DIAGNOSIS — M65.9: ICD-10-CM

## 2017-08-16 DIAGNOSIS — D72.829: ICD-10-CM

## 2017-08-16 DIAGNOSIS — I48.0: ICD-10-CM

## 2017-08-16 DIAGNOSIS — H70.90: ICD-10-CM

## 2017-08-16 LAB
ALP SERPL-CCNC: 95 U/L (ref 38–126)
ALT SERPL-CCNC: 29 U/L (ref 21–72)
ANION GAP SERPL CALC-SCNC: 13 MMOL/L
APTT BLD: 26.2 SEC (ref 22–30)
AST SERPL-CCNC: 25 U/L (ref 17–59)
BASOPHILS # BLD AUTO: 0 K/UL (ref 0–0.2)
BASOPHILS NFR BLD AUTO: 0 %
BUN SERPL-SCNC: 30 MG/DL (ref 9–20)
CALCIUM SPEC-MCNC: 9.2 MG/DL (ref 8.4–10.2)
CH: 30.5
CHCM: 34.8
CHLORIDE SERPL-SCNC: 102 MMOL/L (ref 98–107)
CK SERPL-CCNC: 70 U/L (ref 55–170)
CO2 SERPL-SCNC: 21 MMOL/L (ref 22–30)
EOSINOPHIL # BLD AUTO: 0.1 K/UL (ref 0–0.7)
EOSINOPHIL NFR BLD AUTO: 1 %
ERYTHROCYTE [DISTWIDTH] IN BLOOD BY AUTOMATED COUNT: 4.31 M/UL (ref 4.3–5.9)
ERYTHROCYTE [DISTWIDTH] IN BLOOD: 13.7 % (ref 11.5–15.5)
GLUCOSE SERPL-MCNC: 114 MG/DL (ref 74–99)
HCT VFR BLD AUTO: 37.9 % (ref 39–53)
HDW: 2.93
HGB BLD-MCNC: 13.2 GM/DL (ref 13–17.5)
INR PPP: 1.1 (ref ?–1.2)
KETONES UR QL STRIP.AUTO: (no result)
LUC NFR BLD AUTO: 2 %
LYMPHOCYTES # SPEC AUTO: 1 K/UL (ref 1–4.8)
LYMPHOCYTES NFR SPEC AUTO: 7 %
MAGNESIUM SPEC-SCNC: 2 MG/DL (ref 1.6–2.3)
MCH RBC QN AUTO: 30.7 PG (ref 25–35)
MCHC RBC AUTO-ENTMCNC: 34.9 G/DL (ref 31–37)
MCV RBC AUTO: 88 FL (ref 80–100)
MONOCYTES # BLD AUTO: 1.4 K/UL (ref 0–1)
MONOCYTES NFR BLD AUTO: 9 %
NEUTROPHILS # BLD AUTO: 12.7 K/UL (ref 1.3–7.7)
NEUTROPHILS NFR BLD AUTO: 82 %
NON-AFRICAN AMERICAN GFR(MDRD): >60
PARTICLE COUNT: 3098
PH UR: 6 [PH] (ref 5–8)
POTASSIUM SERPL-SCNC: 3.6 MMOL/L (ref 3.5–5.1)
PROT SERPL-MCNC: 6.9 G/DL (ref 6.3–8.2)
PROT UR QL: (no result)
PT BLD: 11.2 SEC (ref 9–12)
RBC UR QL: 5 /HPF (ref 0–5)
SODIUM SERPL-SCNC: 136 MMOL/L (ref 137–145)
SP GR UR: 1.04 (ref 1–1.03)
TROPONIN I SERPL-MCNC: <0.012 NG/ML (ref 0–0.03)
UA BILLING (MACRO VS. MICRO): (no result)
UROBILINOGEN UR QL STRIP: 2 MG/DL (ref ?–2)
WBC # BLD AUTO: 0.24 10*3/UL
WBC # BLD AUTO: 15.5 K/UL (ref 3.8–10.6)
WBC (PEROX): 15.78

## 2017-08-16 PROCEDURE — 82553 CREATINE MB FRACTION: CPT

## 2017-08-16 PROCEDURE — 70450 CT HEAD/BRAIN W/O DYE: CPT

## 2017-08-16 PROCEDURE — 82550 ASSAY OF CK (CPK): CPT

## 2017-08-16 PROCEDURE — 87522 HEPATITIS C REVRS TRNSCRPJ: CPT

## 2017-08-16 PROCEDURE — 70496 CT ANGIOGRAPHY HEAD: CPT

## 2017-08-16 PROCEDURE — 71010: CPT

## 2017-08-16 PROCEDURE — 85652 RBC SED RATE AUTOMATED: CPT

## 2017-08-16 PROCEDURE — 82306 VITAMIN D 25 HYDROXY: CPT

## 2017-08-16 PROCEDURE — 84550 ASSAY OF BLOOD/URIC ACID: CPT

## 2017-08-16 PROCEDURE — 70553 MRI BRAIN STEM W/O & W/DYE: CPT

## 2017-08-16 PROCEDURE — 85598 HEXAGNAL PHOSPH PLTLT NEUTRL: CPT

## 2017-08-16 PROCEDURE — 96374 THER/PROPH/DIAG INJ IV PUSH: CPT

## 2017-08-16 PROCEDURE — 86334 IMMUNOFIX E-PHORESIS SERUM: CPT

## 2017-08-16 PROCEDURE — 82378 CARCINOEMBRYONIC ANTIGEN: CPT

## 2017-08-16 PROCEDURE — 86140 C-REACTIVE PROTEIN: CPT

## 2017-08-16 PROCEDURE — 93306 TTE W/DOPPLER COMPLETE: CPT

## 2017-08-16 PROCEDURE — 86226 DNA ANTIBODY SINGLE STRAND: CPT

## 2017-08-16 PROCEDURE — 94640 AIRWAY INHALATION TREATMENT: CPT

## 2017-08-16 PROCEDURE — 84443 ASSAY THYROID STIM HORMONE: CPT

## 2017-08-16 PROCEDURE — 83036 HEMOGLOBIN GLYCOSYLATED A1C: CPT

## 2017-08-16 PROCEDURE — 83550 IRON BINDING TEST: CPT

## 2017-08-16 PROCEDURE — 85613 RUSSELL VIPER VENOM DILUTED: CPT

## 2017-08-16 PROCEDURE — 80061 LIPID PANEL: CPT

## 2017-08-16 PROCEDURE — 87086 URINE CULTURE/COLONY COUNT: CPT

## 2017-08-16 PROCEDURE — 82747 ASSAY OF FOLIC ACID RBC: CPT

## 2017-08-16 PROCEDURE — 80053 COMPREHEN METABOLIC PANEL: CPT

## 2017-08-16 PROCEDURE — 86255 FLUORESCENT ANTIBODY SCREEN: CPT

## 2017-08-16 PROCEDURE — 96372 THER/PROPH/DIAG INJ SC/IM: CPT

## 2017-08-16 PROCEDURE — 36415 COLL VENOUS BLD VENIPUNCTURE: CPT

## 2017-08-16 PROCEDURE — 86225 DNA ANTIBODY NATIVE: CPT

## 2017-08-16 PROCEDURE — 86431 RHEUMATOID FACTOR QUANT: CPT

## 2017-08-16 PROCEDURE — 84132 ASSAY OF SERUM POTASSIUM: CPT

## 2017-08-16 PROCEDURE — 86160 COMPLEMENT ANTIGEN: CPT

## 2017-08-16 PROCEDURE — 85730 THROMBOPLASTIN TIME PARTIAL: CPT

## 2017-08-16 PROCEDURE — 86235 NUCLEAR ANTIGEN ANTIBODY: CPT

## 2017-08-16 PROCEDURE — 93880 EXTRACRANIAL BILAT STUDY: CPT

## 2017-08-16 PROCEDURE — 82085 ASSAY OF ALDOLASE: CPT

## 2017-08-16 PROCEDURE — 82310 ASSAY OF CALCIUM: CPT

## 2017-08-16 PROCEDURE — 96361 HYDRATE IV INFUSION ADD-ON: CPT

## 2017-08-16 PROCEDURE — 82728 ASSAY OF FERRITIN: CPT

## 2017-08-16 PROCEDURE — 85610 PROTHROMBIN TIME: CPT

## 2017-08-16 PROCEDURE — 86812 HLA TYPING A B OR C: CPT

## 2017-08-16 PROCEDURE — 83540 ASSAY OF IRON: CPT

## 2017-08-16 PROCEDURE — 95819 EEG AWAKE AND ASLEEP: CPT

## 2017-08-16 PROCEDURE — 84165 PROTEIN E-PHORESIS SERUM: CPT

## 2017-08-16 PROCEDURE — 87340 HEPATITIS B SURFACE AG IA: CPT

## 2017-08-16 PROCEDURE — 70498 CT ANGIOGRAPHY NECK: CPT

## 2017-08-16 PROCEDURE — 83516 IMMUNOASSAY NONANTIBODY: CPT

## 2017-08-16 PROCEDURE — 83735 ASSAY OF MAGNESIUM: CPT

## 2017-08-16 PROCEDURE — 86147 CARDIOLIPIN ANTIBODY EA IG: CPT

## 2017-08-16 PROCEDURE — 82164 ANGIOTENSIN I ENZYME TEST: CPT

## 2017-08-16 PROCEDURE — 82607 VITAMIN B-12: CPT

## 2017-08-16 PROCEDURE — 85025 COMPLETE CBC W/AUTO DIFF WBC: CPT

## 2017-08-16 PROCEDURE — 80048 BASIC METABOLIC PNL TOTAL CA: CPT

## 2017-08-16 PROCEDURE — 99291 CRITICAL CARE FIRST HOUR: CPT

## 2017-08-16 PROCEDURE — 85732 THROMBOPLASTIN TIME PARTIAL: CPT

## 2017-08-16 PROCEDURE — 86200 CCP ANTIBODY: CPT

## 2017-08-16 PROCEDURE — 94760 N-INVAS EAR/PLS OXIMETRY 1: CPT

## 2017-08-16 PROCEDURE — 86038 ANTINUCLEAR ANTIBODIES: CPT

## 2017-08-16 PROCEDURE — 83883 ASSAY NEPHELOMETRY NOT SPEC: CPT

## 2017-08-16 PROCEDURE — 86162 COMPLEMENT TOTAL (CH50): CPT

## 2017-08-16 PROCEDURE — 81001 URINALYSIS AUTO W/SCOPE: CPT

## 2017-08-16 PROCEDURE — 93005 ELECTROCARDIOGRAM TRACING: CPT

## 2017-08-16 PROCEDURE — 84484 ASSAY OF TROPONIN QUANT: CPT

## 2017-08-16 RX ADMIN — CEFAZOLIN SCH MLS/HR: 330 INJECTION, POWDER, FOR SOLUTION INTRAMUSCULAR; INTRAVENOUS at 22:39

## 2017-08-16 NOTE — XR
EXAMINATION TYPE: XR hand complete RT

 

DATE OF EXAM: 8/16/2017

 

COMPARISON: NONE

 

HISTORY: Pain

 

TECHNIQUE: 3 views

 

FINDINGS: There is previous surgery with prosthetic MP joints at the second third fourth and fifth me
tatarsal heads. There is fusion of the first MP joint. There is narrowing of intercarpal joint spaces
 with sclerosis and subchondral cystic change. I see no fracture. There is moderate narrowing with dodson
bchondral cystic change and minimal spur formation at the IP joints.

 

IMPRESSION: Arthritic changes in the right hand with features of inflammatory arthritis. This could b
e long-standing rheumatoid arthritis or erosive osteoarthritis. No acute bony abnormality.

## 2017-08-16 NOTE — CT
EXAMINATION TYPE: CT angio head neck

 

DATE OF EXAM: 8/16/2017

 

HISTORY: TIA symptoms.

 

COMPARISON: NONE

 

CT DLP: 395.28 mGycm.  Automated Exposure Control for Dose Reduction was Utilized.

 

TECHNIQUE:  CTA scan of the neck is performed with IV Contrast, patient injected with 65 mL of Omnipa
que 350, axial images are obtained, coronal and sagittal reformatted images are reviewed. Three-D rec
onstructed images are created on an independent workstation and reviewed.

 

FINDINGS:

The thoracic aorta is atheromatous. There is normal branching pattern of the great vessels on the aor
tic arch. There is bilateral arterial flow in the vertebral arteries. There is arterial flow in the c
ommon internal and external carotid arteries. The carotid artery bifurcations appear widely patent. T
here is no evidence of carotid artery aneurysm or dissection.

 

There is arterial flow in the anterior middle and posterior cerebral arteries. There is arterial flow
 in the vertebrobasilar artery system. Basilar artery fills mostly from the left side. I see no evide
nce of stenosis. There is no mass effect. There is no evidence of aneurysm. There is normal contrast 
opacification of the venous sinuses. There is mild calcification at the carotid artery bifurcations.

 

IMPRESSION: Negative CT angiogram of the neck. Minimal atherosclerotic ossification without evidence 
of stenosis.

 

Negative CT angiogram of the brain.

## 2017-08-16 NOTE — ED
General Adult HPI





- General


Chief complaint: Neuro Symptoms/Deficit


Stated complaint: TIA


Time Seen by Provider: 08/16/17 21:10


Source: patient, family, RN notes reviewed


Mode of arrival: wheelchair


Limitations: no limitations





- History of Present Illness


Initial comments: 





84-year-old male with past medical history of arthritis, and asthma presents 

with acute onset right upper and right lower extremity weakness.  Patient's 

symptoms began approximately 5:30 PM.  According to the patient's daughter he 

is right handed, and he ate his dinner with his left hand which is atypical.  

Patient also had to use his arms to move his right leg.  He does have a history 

of right foot drop, but he normally has good proximal strength.  No slurred 

speech, no acute confusion.  Patient was also noted to have a left-sided facial 

droop.  According to the patient's daughter he has a history of recurrent 

urinary tract infections was seen by his primary care physician for this 

yesterday.  Also has had recent weight loss of approximately 15 pounds likely 

secondary to decreased appetite.  Denies chest pain.  Denies fever or chills.  

Denies abdominal pain.


Severity scale (1-10): 0





- Related Data


 Home Medications











 Medication  Instructions  Recorded  Confirmed


 


Ipratropium-Albuterol Nebulize 3 ml INHALATION RT-QID PRN 07/10/17 08/16/17





[Duoneb 0.5 mg-3 mg/3 ml Soln]   


 


Escitalopram [Lexapro] 10 mg PO QAM 08/16/17 08/16/17


 


Fluticasone/Salmeterol [Advair 1 puff INHALATION RT-BID 08/16/17 08/16/17





250-50 Diskus]   


 


Folic Acid 1 mg PO QAM 08/16/17 08/16/17











 Allergies











Allergy/AdvReac Type Severity Reaction Status Date / Time


 


No Known Allergies Allergy   Verified 07/10/17 11:22














Review of Systems


ROS Statement: 


Those systems with pertinent positive or pertinent negative responses have been 

documented in the HPI.





ROS Other: All systems not noted in ROS Statement are negative.





Past Medical History


Past Medical History: Asthma


Additional Past Medical History / Comment(s): chronic back problems, Rt foot 

drop, Uses a walker


History of Any Multi-Drug Resistant Organisms: None Reported


Date of last positivie culture/infection: 06/07/17


MDRO Source:: URINE


Past Surgical History: Back Surgery


Additional Past Surgical History / Comment(s): RECENT PICC LINE, NOW REMOVED, 

EVA CATARACTS


Past Anesthesia/Blood Transfusion Reactions: No Reported Reaction


Past Psychological History: No Psychological Hx Reported


Smoking Status: Never smoker


Past Alcohol Use History: None Reported


Past Drug Use History: None Reported





- Past Family History


  ** Father


Family Medical History: Myocardial Infarction (MI)





  ** Mother


Family Medical History: No Reported History





General Exam


Limitations: no limitations


General appearance: alert, in no apparent distress


Head exam: Present: atraumatic, normocephalic


Eye exam: Present: normal appearance, PERRL, EOMI.  Absent: scleral icterus, 

conjunctival injection


ENT exam: Present: normal exam, mucous membranes dry


Neck exam: Present: normal inspection, tenderness.  Absent: meningismus


Respiratory exam: Present: normal lung sounds bilaterally.  Absent: respiratory 

distress, wheezes


Cardiovascular Exam: Present: tachycardia, irregular rhythm


GI/Abdominal exam: Present: soft.  Absent: distended, tenderness


Extremities exam: Present: normal capillary refill, joint swelling (Bilateral 

wrists are swollen, worse on the right with overlying erythema.).  Absent: 

pedal edema


Neurological exam: Present: alert, oriented X3, other (NIH is 8, there is right 

lower extremity weakness, right upper extremity drift, and left-sided facial 

droop)


Skin exam: Present: warm, dry, intact.  Absent: cyanosis, diaphoretic





Course





 Vital Signs











  08/16/17 08/16/17 08/16/17





  21:00 21:15 21:30


 


Temperature 97.6 F  


 


Pulse Rate 125 H  


 


Pulse Rate [  126 H 122 H





Cardiac Monitor   





]   


 


Respiratory 18 16 16





Rate   


 


Blood Pressure 140/75  


 


Blood Pressure  140/75 135/75





[Right Arm]   


 


O2 Sat by Pulse  98 99





Oximetry   














  08/16/17 08/16/17 08/16/17





  21:45 22:00 22:15


 


Temperature   


 


Pulse Rate   


 


Pulse Rate [ 124 H 120 H 118 H





Cardiac Monitor   





]   


 


Respiratory 17 16 17





Rate   


 


Blood Pressure   


 


Blood Pressure 124/70 147/75 110/97





[Right Arm]   


 


O2 Sat by Pulse 99 100 100





Oximetry   














  08/16/17 08/16/17 08/16/17





  22:30 22:45 23:00


 


Temperature   


 


Pulse Rate   


 


Pulse Rate [ 143 H 122 H 116 H





Cardiac Monitor   





]   


 


Respiratory 17 17 17





Rate   


 


Blood Pressure   


 


Blood Pressure 143/92 132/72 140/68





[Right Arm]   


 


O2 Sat by Pulse 100 97 99





Oximetry   














- Reevaluation(s)


Reevaluation #1: 





08/16/17 23:34


Patient's NIH is 8, heart rate improving with IV hydration





EKG Findings





- EKG Comments:


EKG Findings:: EKG shows atrial fibrillation with rapid ventricular response, 

ventricular rate of 118, QRS duration 86, , there is no ST segment 

elevation, there is T-wave inversion in the anterior and anterolateral leads





Medical Decision Making





- Medical Decision Making





84-year-old male presenting with right upper and right lower extremity 

weakness.  This began at approximately 5:30 PM.  Patient is evaluated as a code 

stroke.  Initial NIH of 8.  Additional exam findings include atrial 

fibrillation with RVR, and bilateral wrist swelling, worse on the right.  CT 

head is obtained is negative for hemorrhage or acute process, CT angiography of 

the head and neck shows no focal stenosis or filling defect.  Case is discussed 

with the neuro interventional list, patient is also TPA window and he does not 

recommend TPA at this time.  Case is discussed with the admitting team as well 

as neurology on-call.  All parties involved agree TPA is not a good option for 

this patient.  Patient will receive a stat MRI of the brain, be given aspirin.  

EKG does reveal atrial fibrillation with RVR, patient is clinically dehydrated 

on examination and urinalysis does point towards dehydration, he receives IV 

hydration with improvement in heart rate, is also started on metoprolol 25 mg 

by mouth.  White blood cell count is elevated at 15.5, no clear source of 

infection identified.  Troponin is negative.











Patient will be admitted for neurology evaluation, MRI, echo and carotid duplex.








Diagnosis: CVA, concern for brain stem infarction, atrial fibrillation with RVR

, dehydration





- Lab Data


Result diagrams: 


 08/16/17 21:15





 08/16/17 21:15





 Lab Results











  08/16/17 08/16/17 08/16/17 Range/Units





  21:15 21:15 21:15 


 


WBC   15.5 H   (3.8-10.6)  k/uL


 


RBC   4.31   (4.30-5.90)  m/uL


 


Hgb   13.2   (13.0-17.5)  gm/dL


 


Hct   37.9 L   (39.0-53.0)  %


 


MCV   88.0   (80.0-100.0)  fL


 


MCH   30.7   (25.0-35.0)  pg


 


MCHC   34.9   (31.0-37.0)  g/dL


 


RDW   13.7   (11.5-15.5)  %


 


Plt Count   325   (150-450)  k/uL


 


Neutrophils %   82   %


 


Lymphocytes %   7   %


 


Monocytes %   9   %


 


Eosinophils %   1   %


 


Basophils %   0   %


 


Neutrophils #   12.7 H   (1.3-7.7)  k/uL


 


Lymphocytes #   1.0   (1.0-4.8)  k/uL


 


Monocytes #   1.4 H   (0-1.0)  k/uL


 


Eosinophils #   0.1   (0-0.7)  k/uL


 


Basophils #   0.0   (0-0.2)  k/uL


 


PT     (9.0-12.0)  sec


 


INR     (<1.2)  


 


APTT     (22.0-30.0)  sec


 


Sodium    136 L  (137-145)  mmol/L


 


Potassium    3.6  (3.5-5.1)  mmol/L


 


Chloride    102  ()  mmol/L


 


Carbon Dioxide    21 L  (22-30)  mmol/L


 


Anion Gap    13  mmol/L


 


BUN    30 H  (9-20)  mg/dL


 


Creatinine    0.70  (0.66-1.25)  mg/dL


 


Est GFR (MDRD) Af Amer    >60  (>60 ml/min/1.73 sqM)  


 


Est GFR (MDRD) Non-Af    >60  (>60 ml/min/1.73 sqM)  


 


Glucose    114 H  (74-99)  mg/dL


 


Calcium    9.2  (8.4-10.2)  mg/dL


 


Magnesium    2.0  (1.6-2.3)  mg/dL


 


Total Bilirubin    1.0  (0.2-1.3)  mg/dL


 


AST    25  (17-59)  U/L


 


ALT    29  (21-72)  U/L


 


Alkaline Phosphatase    95  ()  U/L


 


Total Creatine Kinase  70    ()  U/L


 


CK-MB (CK-2)  1.7    (0.0-2.4)  ng/mL


 


CK-MB (CK-2) Rel Index  2.4    


 


Troponin I  <0.012    (0.000-0.034)  ng/mL


 


Total Protein    6.9  (6.3-8.2)  g/dL


 


Albumin    3.4 L  (3.5-5.0)  g/dL


 


Urine Color     


 


Urine Appearance     (Clear)  


 


Urine pH     (5.0-8.0)  


 


Ur Specific Gravity     (1.001-1.035)  


 


Urine Protein     (Negative)  


 


Urine Glucose (UA)     (Negative)  


 


Urine Ketones     (Negative)  


 


Urine Blood     (Negative)  


 


Urine Nitrite     (Negative)  


 


Urine Bilirubin     (Negative)  


 


Urine Urobilinogen     (<2.0)  mg/dL


 


Ur Leukocyte Esterase     (Negative)  


 


Urine RBC     (0-5)  /hpf


 


Hyaline Casts     (0-2)  /lpf


 


Urine Mucus     (None)  /hpf














  08/16/17 08/16/17 Range/Units





  21:15 22:35 


 


WBC    (3.8-10.6)  k/uL


 


RBC    (4.30-5.90)  m/uL


 


Hgb    (13.0-17.5)  gm/dL


 


Hct    (39.0-53.0)  %


 


MCV    (80.0-100.0)  fL


 


MCH    (25.0-35.0)  pg


 


MCHC    (31.0-37.0)  g/dL


 


RDW    (11.5-15.5)  %


 


Plt Count    (150-450)  k/uL


 


Neutrophils %    %


 


Lymphocytes %    %


 


Monocytes %    %


 


Eosinophils %    %


 


Basophils %    %


 


Neutrophils #    (1.3-7.7)  k/uL


 


Lymphocytes #    (1.0-4.8)  k/uL


 


Monocytes #    (0-1.0)  k/uL


 


Eosinophils #    (0-0.7)  k/uL


 


Basophils #    (0-0.2)  k/uL


 


PT  11.2   (9.0-12.0)  sec


 


INR  1.1   (<1.2)  


 


APTT  26.2   (22.0-30.0)  sec


 


Sodium    (137-145)  mmol/L


 


Potassium    (3.5-5.1)  mmol/L


 


Chloride    ()  mmol/L


 


Carbon Dioxide    (22-30)  mmol/L


 


Anion Gap    mmol/L


 


BUN    (9-20)  mg/dL


 


Creatinine    (0.66-1.25)  mg/dL


 


Est GFR (MDRD) Af Amer    (>60 ml/min/1.73 sqM)  


 


Est GFR (MDRD) Non-Af    (>60 ml/min/1.73 sqM)  


 


Glucose    (74-99)  mg/dL


 


Calcium    (8.4-10.2)  mg/dL


 


Magnesium    (1.6-2.3)  mg/dL


 


Total Bilirubin    (0.2-1.3)  mg/dL


 


AST    (17-59)  U/L


 


ALT    (21-72)  U/L


 


Alkaline Phosphatase    ()  U/L


 


Total Creatine Kinase    ()  U/L


 


CK-MB (CK-2)    (0.0-2.4)  ng/mL


 


CK-MB (CK-2) Rel Index    


 


Troponin I    (0.000-0.034)  ng/mL


 


Total Protein    (6.3-8.2)  g/dL


 


Albumin    (3.5-5.0)  g/dL


 


Urine Color   Yellow  


 


Urine Appearance   Clear  (Clear)  


 


Urine pH   6.0  (5.0-8.0)  


 


Ur Specific Gravity   1.037 H  (1.001-1.035)  


 


Urine Protein   1+ H  (Negative)  


 


Urine Glucose (UA)   Negative  (Negative)  


 


Urine Ketones   2+ H  (Negative)  


 


Urine Blood   Small H  (Negative)  


 


Urine Nitrite   Negative  (Negative)  


 


Urine Bilirubin   Negative  (Negative)  


 


Urine Urobilinogen   2.0  (<2.0)  mg/dL


 


Ur Leukocyte Esterase   Negative  (Negative)  


 


Urine RBC   5  (0-5)  /hpf


 


Hyaline Casts   64 H  (0-2)  /lpf


 


Urine Mucus   Occasional H  (None)  /hpf














Critical Care Time


Critical Care Time: Yes


Total Critical Care Time: 35





Disposition


Clinical Impression: 


 Cerebrovascular accident, Atrial fibrillation with RVR





Disposition: ADMITTED AS IP TO THIS HOSP


Referrals: 


Eduarda Black MD [Primary Care Provider] - 1-2 days


Decision to Admit Reason: Admit from EC


Decision Date: 08/16/17


Decision Time: 23:00

## 2017-08-16 NOTE — XR
EXAMINATION TYPE: XR chest 1V portable

 

DATE OF EXAM: 8/16/2017

 

COMPARISON: 6/6/2017

 

HISTORY: Altered mental status

 

TECHNIQUE: Single frontal view of the chest is obtained.

 

FINDINGS:  Heart and mediastinum are normal. Lungs are clear. There is no heart failure. There is no 
pleural effusion. Bony thorax is intact. There are chest leads.

 

IMPRESSION:  No active cardiopulmonary disease. No change.

## 2017-08-16 NOTE — CT
EXAMINATION TYPE: CT brain wo con for TPA

 

DATE OF EXAM: 8/16/2017

 

COMPARISON: NONE

 

HISTORY: TIA symptoms.

 

CT DLP: 1176.7 mGycm

Automated exposure control for dose reduction was used.

 

FINDINGS: 

There is cerebral cortical atrophy. There is no mass effect nor midline shift. There is no sign of in
tracranial hemorrhage. There is an extra-axial 1.5 cm rounded calcification at the right parietal con
vexity this could be calcifying hemangioma or osteochondroma. There is no adjacent edema or mass effe
ct. Calvarium is intact.

 

IMPRESSION: 

SMALL CALCIFYING MASS AT THE RIGHT PARIETAL CONVEXITY HAS BENIGN FEATURES. CEREBRAL ATROPHY. NO ACUTE
 INTRACRANIAL ABNORMALITY.

## 2017-08-17 LAB
CALCIUM SPEC-MCNC: 7.8 MG/DL (ref 8.4–10.2)
CHOLEST SERPL-MCNC: 106 MG/DL (ref ?–200)
CK SERPL-CCNC: 47 U/L (ref 55–170)
GLUCOSE BLD-MCNC: 142 MG/DL (ref 75–99)
HDLC SERPL-MCNC: 35 MG/DL (ref 40–60)
HEPATITIS B SURFACE AG INDEX: 0.08
RHEUMATOID FACT SERPL-ACNC: 18 IU/ML (ref ?–12)
URATE SERPL-MCNC: 5.1 MG/DL (ref 3.5–8.5)

## 2017-08-17 RX ADMIN — HEPARIN SODIUM SCH UNIT: 5000 INJECTION, SOLUTION INTRAVENOUS; SUBCUTANEOUS at 00:16

## 2017-08-17 RX ADMIN — METOPROLOL TARTRATE SCH: 25 TABLET, FILM COATED ORAL at 10:41

## 2017-08-17 RX ADMIN — INSULIN LISPRO SCH UNIT: 100 INJECTION, SOLUTION INTRAVENOUS; SUBCUTANEOUS at 20:40

## 2017-08-17 RX ADMIN — BUDESONIDE AND FORMOTEROL FUMARATE DIHYDRATE SCH: 80; 4.5 AEROSOL RESPIRATORY (INHALATION) at 19:58

## 2017-08-17 RX ADMIN — METOPROLOL TARTRATE SCH MG: 25 TABLET, FILM COATED ORAL at 19:49

## 2017-08-17 RX ADMIN — CEFAZOLIN SCH: 330 INJECTION, POWDER, FOR SOLUTION INTRAMUSCULAR; INTRAVENOUS at 12:03

## 2017-08-17 RX ADMIN — APIXABAN SCH MG: 2.5 TABLET, FILM COATED ORAL at 12:48

## 2017-08-17 RX ADMIN — BUDESONIDE SCH MG: 1 SUSPENSION RESPIRATORY (INHALATION) at 19:57

## 2017-08-17 RX ADMIN — IPRATROPIUM BROMIDE AND ALBUTEROL SULFATE PRN ML: .5; 3 SOLUTION RESPIRATORY (INHALATION) at 19:58

## 2017-08-17 RX ADMIN — APIXABAN SCH MG: 2.5 TABLET, FILM COATED ORAL at 19:49

## 2017-08-17 RX ADMIN — HEPARIN SODIUM SCH UNIT: 5000 INJECTION, SOLUTION INTRAVENOUS; SUBCUTANEOUS at 08:16

## 2017-08-17 NOTE — P.CONS
History of Present Illness





- Reason for Consult


Consult date: 08/17/17





- History of Present Illness





Patient is a pleasant 84-year-old male who presented to the [facility] 

ER last night with left facial droop and right-sided weakness.  Patient's 

daughter who is a nurse practitioner at the hospital is present today with 

myself and Dr. Chowdary and tells the majority of patient's history as patient is 

a poor historian.  Per patient's daughter, patient has a history of RA and he 

was treated with gold about 30 years ago.  He is not any RA medications at the 

moment. Patient has had lumbar spinal surgery about 3 years ago and he also has 

history of a right foot drop.  Patient's daughter admits that the patient has 

experienced significant weight loss since last year when he weighed 170 pounds 

and he now weighs 127.  She mentions that on Tuesday patient had some swelling 

in his left wrist and he now seems to have swelling in his right wrist. When 

patient was brought to the hospital he was unable to lift his right leg into 

the car which is unusual for him. Patient does have history of asthma and 

prostate cancer with radiation seeds.  He does get recurrent UTIs however 

urinalysis done in the hospital was found to be negative and chest x-ray that 

was done was also found to be normal so no infectious process has yet to be 

identified.  Patient was found to have a mildly positive rheumatoid factor of 18

, elevated ESR at 78, and a significantly elevated CRP at 190.3.  He has been 

seen by neurology, cardiology, and pulmonology. Patient was evaluated by 

neurology and he was felt to not be a candidate for TPA and patient was also 

found to be in A. fib with rapid ventricular response and he was converted to 

normal sinus rhythm.  X-ray of patient's right hand revealed some features of 

inflammatory arthritis. Upon speaking with the patient today he was sitting up 

in hospital bed comfortably in no acute distress














Review of Systems


All systems: negative


Constitutional: Denies chills, Denies fever


Eyes: denies blurred vision, denies pain


Ears, nose, mouth and throat: Denies headache, Denies sore throat


Cardiovascular: Denies chest pain, Denies shortness of breath


Respiratory: Denies cough


Gastrointestinal: Denies abdominal pain, Denies diarrhea, Denies nausea, Denies 

vomiting


Musculoskeletal: right: hand pain, hand swelling


Integumentary: Denies pruritus, Denies rash


Neurological: Reports weakness, Denies numbness


Psychiatric: Denies anxiety, Denies depression


Endocrine: Reports weight change, Denies fatigue





Past Medical History


Past Medical History: Asthma, Cancer, Prostate Disorder, Rheumatoid Arthritis (

RA)


Additional Past Medical History / Comment(s): chronic back problems, Rt foot 

drop, Uses a walker, prostate cancer, radiation seeds


History of Any Multi-Drug Resistant Organisms: None Reported


Year Discovered:: 06/07/17


MDRO Source:: URINE


Past Surgical History: Back Surgery


Additional Past Surgical History / Comment(s): RECENT PICC LINE, NOW REMOVED, 

EVA CATARACTS, R hand surgery


Past Anesthesia/Blood Transfusion Reactions: No Reported Reaction


Past Psychological History: No Psychological Hx Reported


Smoking Status: Never smoker


Past Alcohol Use History: None Reported


Past Drug Use History: None Reported





- Past Family History


  ** Father


Family Medical History: Myocardial Infarction (MI)





  ** Mother


Family Medical History: No Reported History





Medications and Allergies


 Home Medications











 Medication  Instructions  Recorded  Confirmed  Type


 


Ipratropium-Albuterol Nebulize 3 ml INHALATION RT-QID PRN 07/10/17 08/16/17 

History





[Duoneb 0.5 mg-3 mg/3 ml Soln]    


 


Escitalopram [Lexapro] 10 mg PO QAM 08/16/17 08/16/17 History


 


Fluticasone/Salmeterol [Advair 1 puff INHALATION RT-BID 08/16/17 08/16/17 

History





250-50 Diskus]    


 


Folic Acid 1 mg PO QAM 08/16/17 08/16/17 History











 Allergies











Allergy/AdvReac Type Severity Reaction Status Date / Time


 


No Known Allergies Allergy   Verified 07/10/17 11:22














Physical Exam


Vitals: 


 Vital Signs











  Temp Pulse Pulse Resp BP BP BP


 


 08/17/17 16:00  97.9 F   69  16   127/65 


 


 08/17/17 10:38    58 L  16   104/51 


 


 08/17/17 10:27    50 L    


 


 08/17/17 07:56  96.8 F L   67  16    103/53


 


 08/17/17 03:46    67  16   


 


 08/17/17 03:45  98.2 F   67  16    118/72


 


 08/17/17 01:46    100  16   


 


 08/17/17 00:00  97.2 F L   100  16    116/65


 


 08/16/17 23:00    116 H  17    140/68


 


 08/16/17 22:45    122 H  17    132/72


 


 08/16/17 22:30    143 H  17    143/92


 


 08/16/17 22:15    118 H  17    110/97


 


 08/16/17 22:00    120 H  16    147/75


 


 08/16/17 21:45    124 H  17    124/70


 


 08/16/17 21:30    122 H  16    135/75


 


 08/16/17 21:15    126 H  16    140/75


 


 08/16/17 21:00  97.6 F  125 H   18  140/75  














  Pulse Ox


 


 08/17/17 16:00  98


 


 08/17/17 10:38  99


 


 08/17/17 10:27 


 


 08/17/17 07:56  96


 


 08/17/17 03:46 


 


 08/17/17 03:45  97


 


 08/17/17 01:46 


 


 08/17/17 00:00  99


 


 08/16/17 23:00  99


 


 08/16/17 22:45  97


 


 08/16/17 22:30  100


 


 08/16/17 22:15  100


 


 08/16/17 22:00  100


 


 08/16/17 21:45  99


 


 08/16/17 21:30  99


 


 08/16/17 21:15  98


 


 08/16/17 21:00 








 Intake and Output











 08/17/17 08/17/17 08/17/17





 06:59 14:59 22:59


 


Intake Total  840 


 


Output Total  300 


 


Balance  540 


 


Intake:   


 


  IV  600 


 


    Sodium Chloride 0.9% 1,  600 





    000 ml @ 75 mls/hr IV .   





    [id number] [facility] Rx#:[id number]   


 


  Oral  240 


 


Output:   


 


  Urine  300 


 


Other:   


 


  Voiding Method Urinal Urinal 





 Incontinent Incontinent 


 


  Weight 91.5 kg 58.2 kg 








 Patient Weight











 08/18/17





 06:59


 


Weight 58.2 kg














- Constitutional


General appearance: no acute distress





- EENT


Eyes: EOMI





- Neck


Neck: no lymphadenopathy





- Respiratory


Respiratory: right: CTA, left: diminished





- Cardiovascular


Heart sounds: normal: S1, S2





- Gastrointestinal


General gastrointestinal: no organomegaly, soft, no tenderness





- Integumentary


Integumentary: no rash





- Neurologic


Neurologic: focal deficits (A&O x 1; left sided facial droop noted)





- Musculoskeletal


Musculoskeletal: right sided weakness (cogwheel rigidity noted in RUE; swelling 

in RT wrist; B/L Dupuytren's contractures; limited ROM RT knee )





Results


CBC & Chem 7: 


 08/16/17 21:15





 08/16/17 21:15


Labs: 


 Abnormal Lab Results - Last 24 Hours (Table)











  08/16/17 08/16/17 08/16/17 Range/Units





  21:15 21:15 22:35 


 


WBC  15.5 H    (3.8-10.6)  k/uL


 


Hct  37.9 L    (39.0-53.0)  %


 


Neutrophils #  12.7 H    (1.3-7.7)  k/uL


 


Monocytes #  1.4 H    (0-1.0)  k/uL


 


ESR     (0-15)  mm/hr


 


Sodium   136 L   (137-145)  mmol/L


 


Carbon Dioxide   21 L   (22-30)  mmol/L


 


BUN   30 H   (9-20)  mg/dL


 


Glucose   114 H   (74-99)  mg/dL


 


Calcium     (8.4-10.2)  mg/dL


 


C-Reactive Protein     (<10.0)  mg/L


 


Albumin   3.4 L   (3.5-5.0)  g/dL


 


HDL Cholesterol     (40-60)  mg/dL


 


Ur Specific Gravity    1.037 H  (1.001-1.035)  


 


Urine Protein    1+ H  (Negative)  


 


Urine Ketones    2+ H  (Negative)  


 


Urine Blood    Small H  (Negative)  


 


Hyaline Casts    64 H  (0-2)  /lpf


 


Urine Mucus    Occasional H  (None)  /hpf


 


Rheumatoid Factor     (<12)  IU/mL














  08/17/17 08/17/17 Range/Units





  05:28 05:28 


 


WBC    (3.8-10.6)  k/uL


 


Hct    (39.0-53.0)  %


 


Neutrophils #    (1.3-7.7)  k/uL


 


Monocytes #    (0-1.0)  k/uL


 


ESR   78 H  (0-15)  mm/hr


 


Sodium    (137-145)  mmol/L


 


Carbon Dioxide    (22-30)  mmol/L


 


BUN    (9-20)  mg/dL


 


Glucose    (74-99)  mg/dL


 


Calcium  7.8 L   (8.4-10.2)  mg/dL


 


C-Reactive Protein  190.3 H   (<10.0)  mg/L


 


Albumin    (3.5-5.0)  g/dL


 


HDL Cholesterol  35 L   (40-60)  mg/dL


 


Ur Specific Gravity    (1.001-1.035)  


 


Urine Protein    (Negative)  


 


Urine Ketones    (Negative)  


 


Urine Blood    (Negative)  


 


Hyaline Casts    (0-2)  /lpf


 


Urine Mucus    (None)  /hpf


 


Rheumatoid Factor  18 H   (<12)  IU/mL








 Microbiology - Last 24 Hours (Table)











 08/16/17 22:35 Urine Culture - Preliminary





 Urine,Voided 














Assessment and Plan


Plan: 


At this time, a complete autoimmune workup will be ordered on the patient 

including ANCAs to look for possible vasculitis.  Patient did exhibit some 

parkinsonian features on examination including cogwheel rigidity in his right 

upper extremity and stiffness in his right knee.  He did also seem to have 

synovitis/swelling in his right wrist and some stiffness in his right wrist as 

well.  At this time, we will start patient on prednisone 30 mg daily and patient

's daughter has notified the nurses of this order verbally. At this time, the 

exact cause for patient's significantly elevated CRP is still unknown however 

we will follow patient's lab results for further plan of care.





1. Right-sided weakness and left facial droop; patient being followed by 

neurology 


2. Rheumatoid arthritis; treated in the past not on any immunosuppressives 

currently 


3. New onset Atrial fibrillation 


3. recurrent UTIs 


4. Unintentional weight loss 





The patient was seen and examined by Dr. Chowdary who agrees with the above 

findings, assessment, and plan. 





Time with Patient: Less than 30

## 2017-08-17 NOTE — P.CNPUL
History of Present Illness


Consult date: 08/17/17


Requesting physician: Joey Kamara


Reason for consult: dyspnea


Chief complaint: Right-sided weakness


History of present illness: 





This is a pleasant 84-year-old male patient who follows with Dr. Black as 

his primary care physician.  He does have history of chronic bronchial asthma, 

arthritis, right foot drop, chronic back pain, prostate cancer treated with 

therapy.  He does have a history of ESBL E. coli and had been seen by Dr. Jose 

E he had a PICC line placed and was on IV Invanz and June 2017. He also has a 

history of incomplete bladder emptying secondary to urethral stricture and is 

status post dilation on 07/12/2017.  08/16/2017 the patient was brought to the 

emergency room with right-sided weakness.  His daughter had noted him to be 

eating with his left arm and he is usually right-handed.  He also was utilizing 

his arms to move his right leg which was unusual for him.  A code stroke was 

called and the patient was evaluated by neurology and felt not to be a 

candidate for TPA.  CT angiogram of the neck and brain were negative.  X-ray of 

the right hand showed features of inflammatory arthritis which could be long-

standing rheumatoid arthritis versus erosive osteoarthritis.  No acute bony 

abnormalities were noted.  MRI of the brain revealed age-related atrophic and 

chronic small vessel ischemic changes but no acute intracranial process.  There 

was a high frontal right meningioma.  Chronic sinusitis and mastoiditis.  

Carotid Dopplers revealed no significant stenosis.  Echocardiogram revealed 

preserved left ventricular systolic function with estimated ejection fraction 55

-60%.  The patient was also found to be in atrial fibrillation with rapid 

ventricular response, since converted to normal sinus rhythm.  Cardiology and 

neurology are on the case.  His chest x-ray showed no acute pulmonary process.  

He is maintaining good O2 saturations in the mid to upper 90s on room air.  He 

has been afebrile.  The patient is a poor historian.





Review of Systems


ROS unobtainable: due to mental status





Past Medical History


Past Medical History: Asthma, Cancer, Prostate Disorder, Rheumatoid Arthritis (

RA)


Additional Past Medical History / Comment(s): chronic back problems, Rt foot 

drop, Uses a walker, prostate cancer, radiation seeds


History of Any Multi-Drug Resistant Organisms: None Reported


Date of last positivie culture/infection: 06/07/17


MDRO Source:: URINE


Past Surgical History: Back Surgery


Additional Past Surgical History / Comment(s): RECENT PICC LINE, NOW REMOVED, 

EVA CATARACTS, R hand surgery


Past Anesthesia/Blood Transfusion Reactions: No Reported Reaction


Past Psychological History: No Psychological Hx Reported


Smoking Status: Never smoker


Past Alcohol Use History: None Reported


Past Drug Use History: None Reported





- Past Family History


  ** Father


Family Medical History: Myocardial Infarction (MI)





  ** Mother


Family Medical History: No Reported History





Medications and Allergies


 Home Medications











 Medication  Instructions  Recorded  Confirmed  Type


 


Ipratropium-Albuterol Nebulize 3 ml INHALATION RT-QID PRN 07/10/17 08/16/17 

History





[Duoneb 0.5 mg-3 mg/3 ml Soln]    


 


Escitalopram [Lexapro] 10 mg PO QAM 08/16/17 08/16/17 History


 


Fluticasone/Salmeterol [Advair 1 puff INHALATION RT-BID 08/16/17 08/16/17 

History





250-50 Diskus]    


 


Folic Acid 1 mg PO QAM 08/16/17 08/16/17 History











 Allergies











Allergy/AdvReac Type Severity Reaction Status Date / Time


 


No Known Allergies Allergy   Verified 07/10/17 11:22














Physical Exam


Vitals: 


 Vital Signs











  Temp Pulse Pulse Resp BP BP BP


 


 08/17/17 10:38    58 L  16   104/51 


 


 08/17/17 10:27    50 L    


 


 08/17/17 07:56  96.8 F L   67  16    103/53


 


 08/17/17 03:46    67  16   


 


 08/17/17 03:45  98.2 F   67  16    118/72


 


 08/17/17 01:46    100  16   


 


 08/17/17 00:00  97.2 F L   100  16    116/65


 


 08/16/17 23:00    116 H  17    140/68


 


 08/16/17 22:45    122 H  17    132/72


 


 08/16/17 22:30    143 H  17    143/92


 


 08/16/17 22:15    118 H  17    110/97


 


 08/16/17 22:00    120 H  16    147/75


 


 08/16/17 21:45    124 H  17    124/70


 


 08/16/17 21:30    122 H  16    135/75


 


 08/16/17 21:15    126 H  16    140/75


 


 08/16/17 21:00  97.6 F  125 H   18  140/75  














  Pulse Ox


 


 08/17/17 10:38  99


 


 08/17/17 10:27 


 


 08/17/17 07:56  96


 


 08/17/17 03:46 


 


 08/17/17 03:45  97


 


 08/17/17 01:46 


 


 08/17/17 00:00  99


 


 08/16/17 23:00  99


 


 08/16/17 22:45  97


 


 08/16/17 22:30  100


 


 08/16/17 22:15  100


 


 08/16/17 22:00  100


 


 08/16/17 21:45  99


 


 08/16/17 21:30  99


 


 08/16/17 21:15  98


 


 08/16/17 21:00 








 Intake and Output











 08/16/17 08/17/17 08/17/17





 22:59 06:59 14:59


 


Intake Total   0


 


Balance   0


 


Intake:   


 


  Oral   0


 


Other:   


 


  Voiding Method  Urinal Urinal





  Incontinent Incontinent


 


  Weight 57.606 kg 91.5 kg 91.5 kg








 Patient Weight











 08/18/17





 06:59


 


Weight 91.5 kg














GENERAL EXAM: Alert, comfortable in no apparent distress.


HEAD: Normocephalic.


EYES: Normal reaction of pupils, equal size.


NOSE: Clear with pink turbinates.


THROAT: No erythema or exudates.


NECK: No masses, no JVD.


CHEST: No chest wall deformity.


LUNGS: Equal air entry with no crackles, wheeze, rhonchi or dullness.


CVS: S1 and S2 normal with no audible murmurs, regular rhythm.


ABDOMEN: No hepatosplenomegaly, normal bowel sounds, no guarding or rigidity.


Extremities: There is some right-sided weakness noted.  Changes of arthritis.  

No peripheral edema.  No clubbing, no cyanosis.





Results





- Laboratory Findings


CBC and BMP: 


 08/16/17 21:15





 08/16/17 21:15


PT/INR, D-dimer











PT  11.2 sec (9.0-12.0)   08/16/17  21:15    


 


INR  1.1  (<1.2)   08/16/17  21:15    








Abnormal lab findings: 


 Abnormal Labs











  08/16/17 08/16/17 08/16/17





  21:15 21:15 22:35


 


WBC  15.5 H  


 


Hct  37.9 L  


 


Neutrophils #  12.7 H  


 


Monocytes #  1.4 H  


 


ESR   


 


Sodium   136 L 


 


Carbon Dioxide   21 L 


 


BUN   30 H 


 


Glucose   114 H 


 


Calcium   


 


C-Reactive Protein   


 


Albumin   3.4 L 


 


HDL Cholesterol   


 


Ur Specific Gravity    1.037 H


 


Urine Protein    1+ H


 


Urine Ketones    2+ H


 


Urine Blood    Small H


 


Hyaline Casts    64 H


 


Urine Mucus    Occasional H


 


Rheumatoid Factor   














  08/17/17 08/17/17





  05:28 05:28


 


WBC  


 


Hct  


 


Neutrophils #  


 


Monocytes #  


 


ESR   78 H


 


Sodium  


 


Carbon Dioxide  


 


BUN  


 


Glucose  


 


Calcium  7.8 L 


 


C-Reactive Protein  190.3 H 


 


Albumin  


 


HDL Cholesterol  35 L 


 


Ur Specific Gravity  


 


Urine Protein  


 


Urine Ketones  


 


Urine Blood  


 


Hyaline Casts  


 


Urine Mucus  


 


Rheumatoid Factor  18 H 














- Diagnostic Findings


Chest x-ray: image reviewed





Assessment and Plan


Plan: 





Impression:





#1 Right-sided weakness with suspected acute CVA/TIA.





#2 New-onset atrial fibrillation with rapid ventricular response.  Initiated on 

beta blockers and Eliquis.





#3 Chronic mild persistent bronchial asthma, currently inactive and stable.





#4 Urinary retention secondary to urethral constriction, status post dilatation 

in July 2017.





#5 Recent ESBL E. coli urinary tract infection treated with IV Invanz, PICC 

line since removed.  Repeat culture in progress.





#6 Chronic back pain with previous surgery and noted right foot drop.





#7 Rheumatoid arthritis.  Rheumatoid factor XVIII.





#8 Recent 15 pound weight loss secondary to poor appetite.





#9 Poor overall functional performance secondary to the above-mentioned 

multiple comorbidities.





Plan:





The patient was seen and evaluated by Dr. Mei.  His chest x-ray scans and 

labs were all reviewed.  We will continue with bronchodilators and Pulmicort 

inhalations twice a day as the patient is on Advair and DuoNeb inhalations in 

the outpatient setting. The patient was initiated on vancomycin in regards to 

his urinalysis and recent ESBL infection.  He's been seen by cardiology and the 

patient is now on Eliquis and Lopressor.  He is being followed by neurology as 

well.  We will continue to follow and make further recommendations based on his 

clinical status. 


Time with Patient: Greater than 30

## 2017-08-17 NOTE — P.CRDCN
History of Present Illness


Consult date: 08/17/17


Requesting physician: Eduarda Black


Consult reason: atrial fibrillation


Chief complaint: Right sided weakness


History of present illness: 


This is a very pleasant 84-year-old  gentleman with history of asthma, 

mild dementia, UTIs, who was noted yesterday to have significant weakness in 

his right arm and right leg.  Apparently the patient is right handed and was 

noted to use his left hand to eat dinner.  He was also having to lift his his 

right leg with his left hand in order to move.  He was brought to the hospital 

with suspicion of possible stroke.  Patient has also apparently lost greater 

than 15 pounds recently.  Most of the patient's history was obtained from his 

daughter who is a nurse practitioner.  EKG on arrival here showed atrial 

fibrillation with a moderately rapid ventricular response and ST-T wave 

changes.  Patient has no prior documented history of atrial fibrillation in the 

past.  Since his admission here patient has converted to normal sinus rhythm 

and is in a normal sinus rhythm at the time of my examination.  MRI of the 

brain revealed age-related atrophic and chronic small vessel change no acute 

process.  High right frontal meningioma, chronic sinusitis.  Carotid Doppler 

study and EEG have also been performed this morning and are pending.  The 

pressure on arrival here 140/70 with a heart rate in the 120s, 98% on 2 L of 

oxygen.  White blood cell count 15.5, hemoglobin 13.2, platelet count 325.  Sed 

rate 78.  Sodium 136, potassium 3.6, BUN 30, creatinine 0.7.  Troponin 0.012.  C

-reactive protein 190.3.  Rheumatoid factor 18.





Past Medical History


Past Medical History: Asthma, Cancer, Prostate Disorder, Rheumatoid Arthritis (

RA)


Additional Past Medical History / Comment(s): chronic back problems, Rt foot 

drop, Uses a walker, prostate cancer, radiation seeds


History of Any Multi-Drug Resistant Organisms: None Reported


Date of last positivie culture/infection: 06/07/17


MDRO Source:: URINE


Past Surgical History: Back Surgery


Additional Past Surgical History / Comment(s): RECENT PICC LINE, NOW REMOVED, 

EVA CATARACTS, R hand surgery


Past Anesthesia/Blood Transfusion Reactions: No Reported Reaction


Past Psychological History: No Psychological Hx Reported


Smoking Status: Never smoker


Past Alcohol Use History: None Reported


Past Drug Use History: None Reported





- Past Family History


  ** Father


Family Medical History: Myocardial Infarction (MI)





  ** Mother


Family Medical History: No Reported History





Medications and Allergies


 Home Medications











 Medication  Instructions  Recorded  Confirmed  Type


 


Ipratropium-Albuterol Nebulize 3 ml INHALATION RT-QID PRN 07/10/17 08/16/17 

History





[Duoneb 0.5 mg-3 mg/3 ml Soln]    


 


Escitalopram [Lexapro] 10 mg PO QAM 08/16/17 08/16/17 History


 


Fluticasone/Salmeterol [Advair 1 puff INHALATION RT-BID 08/16/17 08/16/17 

History





250-50 Diskus]    


 


Folic Acid 1 mg PO QAM 08/16/17 08/16/17 History











 Allergies











Allergy/AdvReac Type Severity Reaction Status Date / Time


 


No Known Allergies Allergy   Verified 07/10/17 11:22














Physical Exam


Vitals: 


 Vital Signs











  Temp Pulse Pulse Resp BP BP Pulse Ox


 


 08/17/17 10:27    50 L    


 


 08/17/17 07:56  96.8 F L   67  16   103/53  96


 


 08/17/17 03:46    67  16   


 


 08/17/17 03:45  98.2 F   67  16   118/72  97


 


 08/17/17 01:46    100  16   


 


 08/17/17 00:00  97.2 F L   100  16   116/65  99


 


 08/16/17 23:00    116 H  17   140/68  99


 


 08/16/17 22:45    122 H  17   132/72  97


 


 08/16/17 22:30    143 H  17   143/92  100


 


 08/16/17 22:15    118 H  17   110/97  100


 


 08/16/17 22:00    120 H  16   147/75  100


 


 08/16/17 21:45    124 H  17   124/70  99


 


 08/16/17 21:30    122 H  16   135/75  99


 


 08/16/17 21:15    126 H  16   140/75  98


 


 08/16/17 21:00  97.6 F  125 H   18  140/75  








 Intake and Output











 08/16/17 08/17/17 08/17/17





 22:59 06:59 14:59


 


Intake Total   0


 


Balance   0


 


Intake:   


 


  Oral   0


 


Other:   


 


  Voiding Method  Urinal Urinal





  Incontinent Incontinent


 


  Weight 57.606 kg 91.5 kg 91.5 kg








 Patient Weight











 08/18/17





 06:59


 


Weight 91.5 kg














PHYSICAL EXAMINATION: 





HEENT: Head is atraumatic, normocephalic.  Pupils equal, round.  Neck is 

supple.  There is no elevated jugular venous pressure.





HEART EXAMINATION: Heart S1 and S2 with soft systolic murmur is heard.





CHEST EXAMINATION: Lungs are clear to auscultation and precussion. No chest 

wall tenderness is noted on palpation or with deep breathing.





ABDOMEN:  Soft, nontender. Bowel sounds are heard. No organomegaly noted.


 


EXTREMITIES:[ 2+ peripheral pulses with no evidence of peripheral edema and no 

calf tenderness noted].





NEUROLOGIC [patient is awake, alert and oriented times one





Results





 08/16/17 21:15





 08/16/17 21:15


 Cardiac Enzymes











  08/16/17 08/16/17 Range/Units





  21:15 21:15 


 


AST   25  (17-59)  U/L


 


CK-MB (CK-2)  1.7   (0.0-2.4)  ng/mL


 


Troponin I  <0.012   (0.000-0.034)  ng/mL








 Coagulation











  08/16/17 Range/Units





  21:15 


 


PT  11.2  (9.0-12.0)  sec


 


APTT  26.2  (22.0-30.0)  sec








 Lipids











  08/17/17 Range/Units





  05:28 


 


Triglycerides  49  (<150)  mg/dL


 


Cholesterol  106  (<200)  mg/dL


 


HDL Cholesterol  35 L  (40-60)  mg/dL








 CBC











  08/16/17 Range/Units





  21:15 


 


WBC  15.5 H  (3.8-10.6)  k/uL


 


RBC  4.31  (4.30-5.90)  m/uL


 


Hgb  13.2  (13.0-17.5)  gm/dL


 


Hct  37.9 L  (39.0-53.0)  %


 


Plt Count  325  (150-450)  k/uL








 Comprehensive Metabolic Panel











  08/16/17 08/17/17 Range/Units





  21:15 05:28 


 


Sodium  136 L   (137-145)  mmol/L


 


Potassium  3.6   (3.5-5.1)  mmol/L


 


Chloride  102   ()  mmol/L


 


Carbon Dioxide  21 L   (22-30)  mmol/L


 


BUN  30 H   (9-20)  mg/dL


 


Creatinine  0.70   (0.66-1.25)  mg/dL


 


Glucose  114 H   (74-99)  mg/dL


 


Calcium  9.2  7.8 L  (8.4-10.2)  mg/dL


 


AST  25   (17-59)  U/L


 


ALT  29   (21-72)  U/L


 


Alkaline Phosphatase  95   ()  U/L


 


Total Protein  6.9   (6.3-8.2)  g/dL


 


Albumin  3.4 L   (3.5-5.0)  g/dL








 Current Medications











Generic Name Dose Route Start Last Admin





  Trade Name Freq  PRN Reason Stop Dose Admin


 


Aspirin  325 mg  08/17/17 09:00  





  Aspirin  PO   





  DAILY [facility]   


 


Heparin Sodium (Porcine)  5,000 unit  08/17/17 00:00  08/17/17 08:16





  Heparin  SQ   5,000 unit





  Q8HR SRIDHAR   Administration


 


Sodium Chloride  1,000 mls @ 75 mls/hr  08/16/17 21:30  08/16/17 22:39





  Saline 0.9%  IV   75 mls/hr





  .[id number] SRIDHAR   Administration


 


Vancomycin HCl 1,500 mg/  250 mls @ 125 mls/hr  08/17/17 10:00  





  Sodium Chloride  IVPB   





  Q12H [facility]   


 


Metoprolol Tartrate  25 mg  08/17/17 09:00  





  Lopressor  PO   





  BID [facility]   


 


Miscellaneous Information  1 each  08/16/17 21:10  





  Rx Info: Iv Contrast Was Given  MISCELLANE  08/18/17 21:11  





  DAILY PRN   





  Per Protocol   


 


Miscellaneous Information  1 each  08/18/17 09:00  





  Vancomycin Trough Due  MISCELLANE  08/18/17 09:01  





  ONCE ONE   








 Intake and Output











 08/16/17 08/17/17 08/17/17





 22:59 06:59 14:59


 


Intake Total   0


 


Balance   0


 


Intake:   


 


  Oral   0


 


Other:   


 


  Voiding Method  Urinal Urinal





  Incontinent Incontinent


 


  Weight 57.606 kg 91.5 kg 91.5 kg








 Patient Weight











 08/18/17





 06:59


 


Weight 91.5 kg








 





 08/16/17 21:15 





 08/16/17 21:15 











EKG Interpretations (text)





Original EKG showed atrial fibrillation with a moderately rapid ventricular 

response and nonspecific ST-T wave changes.  Repeat EKG shows normal sinus 

rhythm with no acute changes.





Assessment and Plan


Plan: 


Assessment and plan


#1 symptoms of right-sided arm and leg weakness, rule out CVA.  Neurology 

following.


#2 atrial fibrillation with rapid ventricular response, now in normal sinus 

rhythm.  New-onset.  Paroxysmal in nature.


#3 Mild dementia


#4 recurrent UTIs


#5 asthma








Plan





We will obtain an echocardiogram with Doppler study.  We will also check a free 

T4 and TSH level.  Patient will require anticoagulation for stroke prevention.  

We will look into coverage for Eliquis.  He's currently on heparin 5000 units 

subcu every 8 hour which we will discontinue.  We will also discontinue the 

patient's aspirin.  Further recommendations to follow.








DNP note has been reviewed, I agree with a documented findings and plan of 

care.  Patient was seen and examined.

## 2017-08-17 NOTE — US
EXAMINATION TYPE: US carotid duplex BILAT

 

DATE OF EXAM: 8/17/2017

 

COMPARISON: CTA neck dated 8/16/2017

 

CLINICAL HISTORY: Stenosis.

 

EXAM MEASUREMENTS: 

 

RIGHT:  Peak Systolic Velocity (PSV) cm/sec

----- Right CCA:  77.2  

----- Right ICA:  135.3     

----- Right ECA:  59.0   

ICA/CCA ratio:  1.8    

 

RIGHT:  End Diastole cm/sec

----- Right CCA:  12.4   

----- Right ICA:  24.8      

----- Right ECA:  1.8     

 

LEFT:  Peak Systolic Velocity (PSV) cm/sec

----- Left CCA:  76.1  

----- Left ICA:  97.6   

----- Left ECA:  54.6  

ICA/CCA ratio:  1.3  

 

LEFT:  End Diastole cm/sec

----- Left CCA:  0.0  

----- Left ICA:  18.3   

----- Left ECA:  0.0 

 

VERTEBRALS (direction of flow):

Right Vertebral: Antegrade

Left Vertebral: Antegrade

 

plaque bilateral bulbs. Tortuous right ICA, and left ECA.

 

Grayscale, color Doppler, spectral Doppler imaging performed of the carotid arteries

 

IMPRESSION:  No hemodynamic significant stenosis of the proximal internal carotid arteries bilaterall
y by Doppler criteria, and indirect measurement of carotid stenosis   

 

 

Criteria for Assigning % of Stenosis / Diameter reduction

(Estimation based on the indirect measurements of the internal carotid artery velocities (ICA PSV).

1.  Normal (no stenosis)=ICA PSV < 125 cm/s: ratio < 2.0: ICA EDV<40 cm/s.

2. Less than 50% stenosis=ICA PSV < 125 cm/s: ratio < 2.0: ICA EDV<40 cm/s.

3.  50 to 69% stenosis=ICA PSV of 125 to 230 cm/s: ration 2.0 ? 4.0: ICA EDV  cm/s.

4.  Greater than 70% stenosis to near occlusion= ICA PSV > 230 cm/s: ratio > 4.0: ICA EDV > 100 cm/s.
 

5.  Near occlusion= ICA PSV velocities may be low or undetectable: variable ratio and ICA EDV.

6.  Total occlusion=unable to detect flow.

## 2017-08-17 NOTE — P.CNNES
History of Present Illness


Consult date: 08/17/17


Reason for Consult: Patient admitted with right-sided weakness and possible 

acute stroke.


History of Present Illness: 





This patient is a 84-year-old right-handed white male who was brought in today 

to the emergency room by his daughter and other family members due to change in 

mentation and new findings of right-sided weakness.  Patient currently is 

residing at [facility] and was seen today by his daughter and 

son-in-law.  He appeared to be having more difficulty walking with right-sided 

weakness.  He has a known history of a right foot drop from the previous back 

surgery 3 years ago.  He was fitted for a recent AFO brace and was to obtain it 

in the next few days to use for his walking purposes.  He normally uses a 

walker at home to get about.  Apparently today he was noted to have new onset 

of right-sided weakness involving his right arm as well.  He was also noted to 

have some facial asymmetry and mild facial weakness.  The daughter was very 

concerned and decided to bring him to the emergency room for further 

evaluation.  He was seen in the ER by the ER physician Dr. Alcala who 

ordered a initial computed tomography scan of the brain.  His presentation to 

the ER revealed right-sided weakness and difficulty with his speech.  He had an 

initial NIH stroke scale evaluation which came back at 9.0.  A repeat NIH scale 

was done one hour later and his NIH scale was 6.0.  The patient was evaluated 

by the neuro interventional this Dr. Olivarez with the use of the stroke robot.  

After evaluation of the computed tomography scan of the brain which was 

entirely normal as well as a CTA angiogram study which was done in the ER was 

also normal Dr. Olivarez did not recommend any further stroke intervention.  He 

was not felt to be a TPA candidate as per Dr. Olivarez.  He was recommended 

admission to the hospital with a stroke evaluation.  The patient was surgically 

admitted to the hospital.  Neurology was consulted late this evening for 

further evaluation and recommendations.  Patient was seen in his room after 

admission to the hospital early this morning at about 1 AM.  He does seem to 

show improvement with his right-sided hemiparesis.  He does have a left upper 

motor neuron facial weakness pattern.  His clinical history at this time was 

compounded with the finding of new onset of atrial fibrillation in the ER.  We 

have recommended a cardiology consultation.  Given the higher incidence of 

stroke associated with new onset atrial fibrillation we have recommended a 

aggressive stroke evaluation for this patient.  According to his daughter he 

has been very much independent.  He had been living at the [facility] in his 

own apartment with his wife.  She is [facility] hospitalized [facility] at this time.  The patient apparently has been losing weight according 

to the daughter over the last 6 months.  There is been over a 40 pound weight 

loss recently.  He is in the process of establishing with Dr. Black as his 

primary care physician.  We would recommend further evaluation for possibility 

of occult malignancy in this elderly gentleman.  Overall he seems to be 

pleasant.  He is aware of his right-sided weakness mostly involving his right 

arm.  He denies any headache at this time.  He does have a left upper motor 

neuron facial weakness pattern.  We have recommended the patient undergo an MRI 

of the brain for further evaluation of possible brainstem ischemia or stroke.  

His computed tomography scan of the brain does reveal area of calcification on 

the right superficial area of the right parietal lobe.  This could represent a 

meningioma.  We will have further evaluation with the MRI of the brain.  The 

patient is to continue on aspirin therapy at this time while he is undergoing a 

stroke evaluation.  We have discussed the case at length with the patient as 

well as his daughter at bedside.  All of their questions were answered.  They'

re in full agreement with our current treatment plans.  His overall prognosis 

at this time remains guarded.  We will continue close neurological follow-up 

this patient during this admission.





Review of Systems


Constitutional: Denies chills, Denies fever


Eyes: denies blurred vision, denies pain


Ears, nose, mouth and throat: Denies headache, Denies sore throat


Cardiovascular: Denies chest pain, Denies shortness of breath


Respiratory: Denies cough


Gastrointestinal: Denies abdominal pain, Denies diarrhea, Denies nausea, Denies 

vomiting


Musculoskeletal: Denies myalgias


Integumentary: Denies pruritus, Denies rash


Neurological: Reports change in mentation, Reports change in speech, Reports 

confusion, Reports gait dysfunction, Reports memory loss, Reports motor 

disturbance, Denies numbness, Denies weakness


Psychiatric: Reports memory loss, Denies anxiety, Denies depression


Endocrine: Denies fatigue, Denies weight change





Past Medical History


Past Medical History: Asthma, Cancer, Prostate Disorder, Rheumatoid Arthritis (

RA)


Additional Past Medical History / Comment(s): chronic back problems, Rt foot 

drop, Uses a walker, prostate cancer, radiation seeds


History of Any Multi-Drug Resistant Organisms: None Reported


Date of last positivie culture/infection: 06/07/17


MDRO Source:: URINE


Past Surgical History: Back Surgery


Additional Past Surgical History / Comment(s): RECENT PICC LINE, NOW REMOVED, 

EVA CATARACTS, R hand surgery


Past Anesthesia/Blood Transfusion Reactions: No Reported Reaction


Past Psychological History: No Psychological Hx Reported


Smoking Status: Never smoker


Past Alcohol Use History: None Reported


Past Drug Use History: None Reported





- Past Family History


  ** Father


Family Medical History: Myocardial Infarction (MI)





  ** Mother


Family Medical History: No Reported History





Medications and Allergies


 Home Medications











 Medication  Instructions  Recorded  Confirmed  Type


 


Ipratropium-Albuterol Nebulize 3 ml INHALATION RT-QID PRN 07/10/17 08/16/17 

History





[Duoneb 0.5 mg-3 mg/3 ml Soln]    


 


Escitalopram [Lexapro] 10 mg PO QAM 08/16/17 08/16/17 History


 


Fluticasone/Salmeterol [Advair 1 puff INHALATION RT-BID 08/16/17 08/16/17 

History





250-50 Diskus]    


 


Folic Acid 1 mg PO QAM 08/16/17 08/16/17 History











 Allergies











Allergy/AdvReac Type Severity Reaction Status Date / Time


 


No Known Allergies Allergy   Verified 07/10/17 11:22














Physical Examination





- Vital Signs


Vital Signs: 


 Vital Signs











  Temp Pulse Pulse Resp BP BP Pulse Ox


 


 08/17/17 00:00  97.2 F L   100  16   116/65  99


 


 08/16/17 23:00    116 H  17   140/68  99


 


 08/16/17 22:45    122 H  17   132/72  97


 


 08/16/17 22:30    143 H  17   143/92  100


 


 08/16/17 22:15    118 H  17   110/97  100


 


 08/16/17 22:00    120 H  16   147/75  100


 


 08/16/17 21:45    124 H  17   124/70  99


 


 08/16/17 21:30    122 H  16   135/75  99


 


 08/16/17 21:15    126 H  16   140/75  98


 


 08/16/17 21:00  97.6 F  125 H   18  140/75  








 Intake and Output











 08/16/17 08/16/17 08/17/17





 14:59 22:59 06:59


 


Other:   


 


  Weight  57.606 kg 99 kg








 Patient Weight











 08/17/17





 06:59


 


Weight 99 kg














- Constitutional


General appearance: thin





- EENT


EENT: PERRL, mucous membranes moist





- Respiratory


Respiratory: lungs clear, normal breath sounds





- Cardiovascular


Cardiovascular: regular rate, normal S1, normal S2


Extremities: no peripheral edema bilaterally





- Gastrointestinal


Gastrointestinal: normoactive bowel sounds





- Integumentary


Integumentary: normal





- Neurologic


Cranial nerve examination: PERRL, EOMI, VFF, V1/V2/V3 grossly intact, tongue 

midline, intact gag reflex, intact corneal reflex, facial droop (There is left-

sided facial asymmetry and left upper motor neuron weakness pattern.), normal 

palatal elevation


Speech examination: intact


Sensorimotor examination: intact


Motor examination - right side: 3/5: biceps, triceps, wrist flexion, wrist 

extension, , hip flexors, knee extensors, dorsiflexion, toe extension (EHL)

, plantarflexion


Motor examination - left side: 4/5: biceps, triceps, wrist flexion, wrist 

extension, , hip flexors, knee extensors, dorsiflexion, toe extension (EHL)

, plantarflexion


Detailed sensory examination: intact


Reflex and gait examination: intact


Reflexes: 1+: ankle, bicep, knee, tricep





- Musculoskeletal


Musculoskeletal: no pain





- Psychiatric


Psychiatric: mood/affect appropriate, depressed, cooperative





Results





- Laboratory Findings


CBC and BMP: 


 08/16/17 21:15





 08/16/17 21:15


Abnormal Lab Findings: 


 Abnormal Labs











  08/16/17 08/16/17 08/16/17





  21:15 21:15 22:35


 


WBC  15.5 H  


 


Hct  37.9 L  


 


Neutrophils #  12.7 H  


 


Monocytes #  1.4 H  


 


Sodium   136 L 


 


Carbon Dioxide   21 L 


 


BUN   30 H 


 


Glucose   114 H 


 


Albumin   3.4 L 


 


Ur Specific Gravity    1.037 H


 


Urine Protein    1+ H


 


Urine Ketones    2+ H


 


Urine Blood    Small H


 


Hyaline Casts    64 H


 


Urine Mucus    Occasional H














Assessment and Plan


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


Status: Acute   Code(s): I63.219 - CEREB INFRC DUE TO UNSP OCCLS OR STENOSIS OF 

UNSP VERTEB ART; I63.22 - CEREBRAL INFRC DUE TO UNSP OCCLS OR STENOSIS OF 

BASILAR ART   





(2) New onset atrial fibrillation


Status: Acute   Code(s): I48.91 - UNSPECIFIED ATRIAL FIBRILLATION   





(3) Right foot drop


Status: Acute   Code(s): M21.371 - FOOT DROP, RIGHT FOOT   





(4) History of laminectomy


Status: Acute   Code(s): Z98.890 - OTHER SPECIFIED POSTPROCEDURAL STATES   


Plan: 





This patient is a pleasant 84-year-old right-handed white male who was admitted 

to Hospital with symptoms of right-sided weakness and left facial droop.  He 

was brought into the emergency room and was evaluated by the neuro 

interventional group at [facility].  The stroke robot was 

activated and he was evaluated.  Dr. Olivarez reviewed his computed tomography 

scan of the brain and CTA angiogram results and felt he was not a candidate for 

TPA.  He was admitted to hospital for further neurological assessment and 

treatment.  His neurological exam findings at this time revealed right 

hemiparesis with a left facial weakness pattern.  This findings suggest cross 

neurological localization suggesting possibility of brainstem ischemia or 

brainstem stroke.  We have recommended an MRI of the brain for further 

evaluation.  His CTA angiogram was reported negative for any evidence of any 

arterial stenosis.  We will continue with close neurological follow-up for this 

patient during this admission.  He is to be maintained on aspirin therapy for 

secondary stroke prevention at this time pending his stroke workup.  This case 

was discussed at length with the patient's daughter.  All of her questions were 

answered.  She is aware of his guarded condition.  We will continue close 

neurological follow-up with this patient.  His overall prognosis at this time 

remains guarded.


Time with Patient: Greater than 30

## 2017-08-17 NOTE — P.PN
Subjective





This patient is a 84-year-old right-handed white male who was seen in neurology 

consultation yesterday for evaluation of possible stroke.  Patient had 

undergone an initial evaluation in the ER due to increased symptoms of right-

sided weakness and left facial droop.  Patient underwent an initial computed 

tomography scan of the brain which was negative for any acute findings.  He was 

sent for MRI of the brain today which is reported to show only age-related 

atrophy and chronic small vessel ischemic changes.  There was evidence of a 

high bright frontal meningioma.  MRI was done with and without gadolinium.  No 

other acute changes were noted.  Review of the actual MRI films however reveals 

a area of possible ischemia involving the left luciano.  We will review this MRI 

films with the radiologist tomorrow for further assessment.  The patient has 

been seen by cardiology today.  His EKG showed atrial fibrillation with a 

moderately rapid ventricular response with nonspecific ST-T wave changes.  

Patient is to undergo an echocardiogram study.  Patient will require 

anticoagulation for stroke prevention.  Cardiology is recommending Eliquis as 

possible further treatment for anticoagulation.  Patient was taken off of 

aspirin by cardiology.  He was sent for MRI of the brain today as noted above 

revealed only age-related atrophy.  We are waiting to have a second evaluation 

of this MRI tomorrow by radiology.  Overall the patient seems to be relatively 

stable.  He did undergo some laboratory testing today and results were 

reviewed.  His C-reactive protein is quite elevated at 190.3.  His rheumatoid 

factor is positive at 18.0.  CEA antigen came back within the normal range at 

2.3.  Total cholesterol was 106.  We have reviewed all of these test results 

today with the patient and his daughter in detail.  They're updated on the MRI 

results.  We will continue close follow-up with the patient during this 

admission.  His overall prognosis at this time remains guarded.





Objective





- Vital Signs


Vital signs: 


 Vital Signs











Temp  97.9 F   08/17/17 16:00


 


Pulse  69   08/17/17 16:00


 


Resp  16   08/17/17 16:00


 


BP  127/65   08/17/17 16:00


 


Pulse Ox  98   08/17/17 16:00








 Intake & Output











 08/16/17 08/17/17 08/17/17





 18:59 06:59 18:59


 


Intake Total   840


 


Output Total   300


 


Balance   540


 


Weight  91.5 kg 58.2 kg


 


Intake:   


 


  IV   600


 


    Sodium Chloride 0.9% 1,   600





    000 ml @ 75 mls/hr IV .   





    [id number] [facility] Rx#:[id number]   


 


  Oral   240


 


Output:   


 


  Urine   300


 


Other:   


 


  Voiding Method  Urinal Urinal





  Incontinent Incontinent














- Exam





Physical examination:





PHYSICAL EXAMINATION: Patient is resting comfortably in bed. 


VITAL SIGNS: Blood pressure is [127/65]. Heart rate is [69]. Respiration is [16]

. Temperature is [97.9].


HEENT: Head is atraumatic, neck is supple, there were no carotid bruits.


CHEST: Lungs are clear to auscultation and percussion.  


CARDIAC: S1, S2 normal rate and rhythm. There is no murmur.


ABDOMEN: Soft and nontender. Bowel sounds are present.


EXTREMITIES:  There is no pedal edema.  Peripheral pulses are present.





Neurological examination:





Patient's neurological examination is unchanged from yesterday.  Patient has 

been up and ambulating in his room.  He does have some unsteady gait.





- Labs


CBC & Chem 7: 


 08/16/17 21:15





 08/16/17 21:15


Labs: 


 Abnormal Lab Results - Last 24 Hours (Table)











  08/16/17 08/16/17 08/16/17 Range/Units





  21:15 21:15 22:35 


 


WBC  15.5 H    (3.8-10.6)  k/uL


 


Hct  37.9 L    (39.0-53.0)  %


 


Neutrophils #  12.7 H    (1.3-7.7)  k/uL


 


Monocytes #  1.4 H    (0-1.0)  k/uL


 


ESR     (0-15)  mm/hr


 


Sodium   136 L   (137-145)  mmol/L


 


Carbon Dioxide   21 L   (22-30)  mmol/L


 


BUN   30 H   (9-20)  mg/dL


 


Glucose   114 H   (74-99)  mg/dL


 


Calcium     (8.4-10.2)  mg/dL


 


C-Reactive Protein     (<10.0)  mg/L


 


Albumin   3.4 L   (3.5-5.0)  g/dL


 


HDL Cholesterol     (40-60)  mg/dL


 


Ur Specific Gravity    1.037 H  (1.001-1.035)  


 


Urine Protein    1+ H  (Negative)  


 


Urine Ketones    2+ H  (Negative)  


 


Urine Blood    Small H  (Negative)  


 


Hyaline Casts    64 H  (0-2)  /lpf


 


Urine Mucus    Occasional H  (None)  /hpf


 


Rheumatoid Factor     (<12)  IU/mL














  08/17/17 08/17/17 Range/Units





  05:28 05:28 


 


WBC    (3.8-10.6)  k/uL


 


Hct    (39.0-53.0)  %


 


Neutrophils #    (1.3-7.7)  k/uL


 


Monocytes #    (0-1.0)  k/uL


 


ESR   78 H  (0-15)  mm/hr


 


Sodium    (137-145)  mmol/L


 


Carbon Dioxide    (22-30)  mmol/L


 


BUN    (9-20)  mg/dL


 


Glucose    (74-99)  mg/dL


 


Calcium  7.8 L   (8.4-10.2)  mg/dL


 


C-Reactive Protein  190.3 H   (<10.0)  mg/L


 


Albumin    (3.5-5.0)  g/dL


 


HDL Cholesterol  35 L   (40-60)  mg/dL


 


Ur Specific Gravity    (1.001-1.035)  


 


Urine Protein    (Negative)  


 


Urine Ketones    (Negative)  


 


Urine Blood    (Negative)  


 


Hyaline Casts    (0-2)  /lpf


 


Urine Mucus    (None)  /hpf


 


Rheumatoid Factor  18 H   (<12)  IU/mL








 Microbiology - Last 24 Hours (Table)











 08/16/17 22:35 Urine Culture - Preliminary





 Urine,Voided 














Assessment and Plan


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


Status: Acute   Code(s): I63.219 - CEREB INFRC DUE TO UNSP OCCLS OR STENOSIS OF 

UNSP VERTEB ART; I63.22 - CEREBRAL INFRC DUE TO UNSP OCCLS OR STENOSIS OF 

BASILAR ART   





(2) New onset atrial fibrillation


Status: Acute   Code(s): I48.91 - UNSPECIFIED ATRIAL FIBRILLATION   





(3) Right foot drop


Status: Acute   Code(s): M21.371 - FOOT DROP, RIGHT FOOT   





(4) History of laminectomy


Status: Acute   Code(s): Z98.890 - OTHER SPECIFIED POSTPROCEDURAL STATES   


Plan: 





This patient is a pleasant 84-year-old right-handed white male who was admitted 

to Hospital with symptoms of right-sided weakness and left facial droop.  He 

was brought into the emergency room and was evaluated by the neuro 

interventional group at [facility].  The stroke robot was 

activated and he was evaluated.  Dr. Olivarez reviewed his computed tomography 

scan of the brain and CTA angiogram results and felt he was not a candidate for 

TPA.  He was admitted to hospital for further neurological assessment and 

treatment.  His neurological exam findings at this time revealed right 

hemiparesis with a left facial weakness pattern.  This findings suggest cross 

neurological localization suggesting possibility of brainstem ischemia or 

brainstem stroke.  We have recommended an MRI of the brain for further 

evaluation.  His CTA angiogram was reported negative for any evidence of any 

arterial stenosis.  We will continue with close neurological follow-up for this 

patient during this admission.  He is to be maintained on aspirin therapy for 

secondary stroke prevention at this time pending his stroke workup.  The 

patient underwent MRI of the brain today for further evaluation of possible 

brainstem ischemia.  This MRI is reported negative for any evidence of acute 

stroke.  Patient was seen by cardiology regarding his new onset atrial 

fibrillation.  We will await further recommendations.  We did review the 

results of the MRI today with the patient in detail.  He underwent a routine 

EEG today as well which was reviewed.  EEG is moderately slow.  No evidence of 

any epileptiform discharges were seen.  We will continue close neurological 

follow-up of this patient during this admission.  Patient did have evidence of 

elevated C-reactive protein.  Rheumatology has been consulted.  His overall 

prognosis at this time remains guarded.

## 2017-08-17 NOTE — HP
DATE OF SERVICE: 08/17/2017



CHIEF COMPLAINT: Weakness of the right side of the body.



HISTORY OF PRESENT ILLNESS: This 84-year-old gentleman with a past medical 
history of multiple medical problems, including ESBL UTI, history of possible 
dementia, history of dehydration, malnutrition, being followed by Dr. Black 
in the outpatient setting, was admitted with generalized tiredness and weakness
, mainly on the right side, and some left facial droop. The patient was taken 
to [facility] and admitted for further evaluation and treatment. 
Initial CT scan and CTA were negative. MRI was also done. The patient was 
deemed not a candidate for TPA as per the neuro interventionist because of the 
age of the patient as well as multiple complex medical issues and timing of the 
possible stroke. The patient also had ESBL UTI. There is no history of any fever
, rigor or chills; no history of headache, loss of consciousness, seizures.



PAST MEDICAL HISTORY:

1. History of asthma. 

2. History of DJD.

3. History of rheumatoid arthritis.



HOME MEDICATIONS: 

1. DuoNeb q.i.d. and p.r.n.

2. Folic acid 1 mg each morning.

3. Advair 250/50 one puff b.i.d.

4. Lexapro 10 mg in the morning.



ALLERGIES: NONE.



FAMILY HISTORY: History of myocardial infarction in the family. 



SOCIAL HISTORY: No history of smoking. Occasional alcohol intake.



REVIEW OF SYSTEMS: 

ENT: No diminished hearing. No diminished vision.

CARVIOVASCULAR SYSTEM: No angina, palpitations.

RESPIRATORY SYSTEM: No cough, hemoptysis. 

GI: No nausea, vomiting. 

: No dysuria, retention.

NERVOUS SYSTEM: As mentioned earlier. 

ALLERGY/IMMUNOLOGY: No asthma, hayfever. 

MUSCULOSKELETAL: As mentioned earlier.

HEMATOLOGY/ONCOLOGY: No history of anemia. 

ENDOCRINE: No history of diabetes, hypothyroidism.

CONSTITUTIONAL: As mentioned earlier.

DERMATOLOGY: Negative. 

RHEUMATOLOGY: Negative.

PSYCHIATRY: As mentioned earlier. 



PHYSICAL EXAM: Patient is alert and oriented x2. Pulse is 67, blood pressure 103
/58, respiration 16, temperature 97.8, pulse ox 96% on room air.

HEENT: Conjunctivae normal. 

NECK: No jugular venous distention.

CARDIOVASCULAR: S1, S2 muffled. No S3. No S4. 

RESPIRATORY: Breath sounds diminished at the bases. No rhonchi. No crackles.

ABDOMEN: Soft. Non-tender. No mass palpable. 

LEGS: No edema. No swelling.

NERVOUS SYSTEM: Higher functions as mentioned earlier. Cranial nerves 2 through 
12 grossly intact. Significant weakness of the right upper limb as well as the 
right lower leg present. Right lower leg is about 3. Right upper limb is 4 to 4+
.  

LYMPHATICS: No lymph node palpable in neck, axillae or groin.

SKIN: No ulcer, rash, bleeding.

JOINTS: No active deforming arthropathy. Minimal swelling of the joints 
present. 



Lab investigations at this time show WBC 15.4, hemoglobin 13.2. Sodium 136. 
Potassium 3.6. UA noted. Other labs are noted.



ASSESSMENT: 

1. Weakness of the right side of the body; possible acute stroke involving the 
left hemisphere.

2. Hyponatremia.

3. Increased white count.

ST-T changes in EKG, r/o CAD

4. History of recent urinary tract infection with ESBL E coli. 

5. History of asthma. 

6. History of rheumatoid arthritis. 

7. NO CODE, NO CPR, NO VENT. 



RECOMMENDATIONS AND DISCUSSION: In this 84-year-old gentleman who presented 
with multiple complex medical issues, we will monitor the patient closely, 
continue the current medications, continue with symptomatic treatment. 
Cardiology consultation and neurology consultation. PT/OT evaluation. Otherwise
, I would recommend antiplatelet agents. Continue with Eliquis. DVT 
prophylaxis. Incentive spirometry. GI prophylaxis. Further recommendations to 
follow. Symptomatic treatment. See orders for further details. 
MTDD

## 2017-08-17 NOTE — ECHOF
Referral Reason:Thrombus



MEASUREMENTS

--------

HEIGHT: 182.9 cm

WEIGHT: 91.2 kg

BP: 118/72

IVSd:   1.0 cm     (0.6 - 1.1)

LVIDd:   4.2 cm     (3.9 - 5.3)

LVPWd:   1.1 cm     (0.6 - 1.1)

IVSs:   1.2 cm

LVIDs:   3.5 cm

LVPWs:   1.3 cm

LA Diam:   3.1 cm     (2.7 - 3.8)

MV EXCURSION:   27.614 mm     (> 18.000)

MV EF SLOPE:   143 mm/s     (70 - 150)

EPSS:   0.5 cm

MV E Jax:   0.71 m/s

MV DecT:   188 ms

MV A Jax:   0.59 m/s

MV E/A Ratio:   1.22 

RAP:   5.00 mmHg

RVSP:   31.23 mmHg







FINDINGS

--------

Undetermined rhythm.

This was a technically adequate study.

There is mild concentric left ventricular hypertrophy.  

 Overall left ventricular systolic function is normal 

with, an EF between 55 - 60 %.

The right ventricle is normal in size. Prominent Apical 

trabeculae, no clearcut thrombus.

The left atrial size is normal.

The right atrial size is normal.

There is mild aortic valve sclerosis.   There is no 

evidence of aortic regurgitation.

Mild mitral annular calcification present.   Mild 

mitral regurgitation is present.

Mild tricuspid regurgitation present.   There is no 

evidence of pulmonary hypertension.   The right 

ventricular systolic pressure, as measured by Doppler, 

is 31.23mmHg.

There is no pulmonic regurgitation present.

The aortic root size is normal.

There is no pericardial effusion.



CONCLUSIONS

--------

1. There is mild concentric left ventricular hypertrophy.

2. Overall left ventricular systolic function is normal 

with, an EF between 55 - 60 %.

3. There is mild aortic valve sclerosis.

4. Mild mitral annular calcification present.

5. Mild mitral regurgitation is present.

6. Mild tricuspid regurgitation present.

7. There is no evidence of pulmonary hypertension.

8. The right ventricular systolic pressure, as measured by 

Doppler, is 31.23mmHg.

9. There is no pulmonic regurgitation present.





SONOGRAPHER: Amada Beckman RDCS

## 2017-08-17 NOTE — MR
EXAMINATION TYPE: MR brain wo/w con

 

DATE OF EXAM: 8/17/2017 7:27 AM

 

COMPARISON: CT brain August 16, 2017

 

HISTORY: possible brainstem stroke

 

CONTRAST: Patient received 19 mL intravenous MultiHance gadolinium contrast.  

 

Multiplanar and multispin-echo imaging of the brain was performed .  Pre and post contrast enhanced i
mages are obtained.

 

The ventricles, basal cisterns and sulci overlying the cerebral convexities are moderately enlarged w
ith greater central component.  

 

There is evidence of mild to moderate periventricular white matter ischemic demyelination.  

 

Remote deep white matter insults are also noted.  

 

No acute edema is seen on diffusion weighted  imaging.   

 

There is no evidence for midline shift or mass effect.  

 

Acute intracranial hemorrhage or extra-axial collection is not evident.   

 

Enhancing extra-axial lesion high right frontal region measuring 1.2 x 1.4 cm compatible with meningi
pavan.

 

Chronic paranasal sinusitis. Chronic right-sided mastoiditis.

 

IMPRESSION:    

 

1. Age-related atrophic and chronic small vessel ischemic change.  No acute intracranial process at t
his time.

2. High right frontal meningioma.

3. Chronic sinusitis and mastoiditis.

## 2017-08-18 LAB
ANA W/REFLEX TO TITER: NEGATIVE
ANION GAP SERPL CALC-SCNC: 10 MMOL/L
BASOPHILS # BLD AUTO: 0 K/UL (ref 0–0.2)
BASOPHILS NFR BLD AUTO: 0 %
BUN SERPL-SCNC: 12 MG/DL (ref 9–20)
C-ANCA TITR SER: (no result) TITER
CALCIUM SPEC-MCNC: 8.2 MG/DL (ref 8.4–10.2)
CCP IGG SERPL-ACNC: 148.2 U/ML
CH: 31.3
CHCM: 34
CHLORIDE SERPL-SCNC: 107 MMOL/L (ref 98–107)
CO2 SERPL-SCNC: 23 MMOL/L (ref 22–30)
EOSINOPHIL # BLD AUTO: 0 K/UL (ref 0–0.7)
EOSINOPHIL NFR BLD AUTO: 0 %
ERYTHROCYTE [DISTWIDTH] IN BLOOD BY AUTOMATED COUNT: 3.44 M/UL (ref 4.3–5.9)
ERYTHROCYTE [DISTWIDTH] IN BLOOD: 14 % (ref 11.5–15.5)
GLUCOSE BLD-MCNC: 141 MG/DL (ref 75–99)
GLUCOSE BLD-MCNC: 147 MG/DL (ref 75–99)
GLUCOSE BLD-MCNC: 148 MG/DL (ref 75–99)
GLUCOSE BLD-MCNC: 148 MG/DL (ref 75–99)
GLUCOSE SERPL-MCNC: 137 MG/DL (ref 74–99)
HCT VFR BLD AUTO: 31.8 % (ref 39–53)
HDW: 2.89
HEMOGLOBIN A1C: 5 % (ref 4.2–6.1)
HGB BLD-MCNC: 10.5 GM/DL (ref 13–17.5)
KAPPA LC FREE SER NEPH-MCNC: 4.13 MG/DL (ref 0.33–1.94)
LUC NFR BLD AUTO: 0 %
LYMPHOCYTES # SPEC AUTO: 0.3 K/UL (ref 1–4.8)
LYMPHOCYTES NFR SPEC AUTO: 3 %
MCH RBC QN AUTO: 30.6 PG (ref 25–35)
MCHC RBC AUTO-ENTMCNC: 33.1 G/DL (ref 31–37)
MCV RBC AUTO: 92.4 FL (ref 80–100)
MONOCYTES # BLD AUTO: 0.6 K/UL (ref 0–1)
MONOCYTES NFR BLD AUTO: 6 %
NEUTROPHILS # BLD AUTO: 9.2 K/UL (ref 1.3–7.7)
NEUTROPHILS NFR BLD AUTO: 91 %
NON-AFRICAN AMERICAN GFR(MDRD): >60
P-ANCA TITR SER IF: (no result) TITER
PATH INTERP SPEC-IMP: NEGATIVE
POTASSIUM SERPL-SCNC: 3.2 MMOL/L (ref 3.5–5.1)
RNP AB INTERPRETATION: NEGATIVE
SCLERODERMA SC-70 AB INTERP: NEGATIVE
SODIUM SERPL-SCNC: 140 MMOL/L (ref 137–145)
WBC # BLD AUTO: 0.04 10*3/UL
WBC # BLD AUTO: 10.1 K/UL (ref 3.8–10.6)
WBC (PEROX): 10

## 2017-08-18 RX ADMIN — CEFAZOLIN SCH MLS/HR: 330 INJECTION, POWDER, FOR SOLUTION INTRAMUSCULAR; INTRAVENOUS at 00:15

## 2017-08-18 RX ADMIN — MAGNESIUM SULFATE IN DEXTROSE SCH MLS/HR: 10 INJECTION, SOLUTION INTRAVENOUS at 19:55

## 2017-08-18 RX ADMIN — METOPROLOL TARTRATE SCH MG: 25 TABLET, FILM COATED ORAL at 19:54

## 2017-08-18 RX ADMIN — PANTOPRAZOLE SODIUM SCH MG: 40 TABLET, DELAYED RELEASE ORAL at 06:42

## 2017-08-18 RX ADMIN — MAGNESIUM SULFATE IN DEXTROSE SCH MLS/HR: 10 INJECTION, SOLUTION INTRAVENOUS at 21:25

## 2017-08-18 RX ADMIN — INSULIN LISPRO SCH UNIT: 100 INJECTION, SOLUTION INTRAVENOUS; SUBCUTANEOUS at 06:41

## 2017-08-18 RX ADMIN — MEGESTROL ACETATE SCH MG: 40 SUSPENSION ORAL at 17:28

## 2017-08-18 RX ADMIN — BUDESONIDE AND FORMOTEROL FUMARATE DIHYDRATE SCH: 80; 4.5 AEROSOL RESPIRATORY (INHALATION) at 08:52

## 2017-08-18 RX ADMIN — POTASSIUM CHLORIDE SCH MEQ: 20 TABLET, EXTENDED RELEASE ORAL at 17:25

## 2017-08-18 RX ADMIN — BUDESONIDE SCH MG: 1 SUSPENSION RESPIRATORY (INHALATION) at 08:52

## 2017-08-18 RX ADMIN — METOPROLOL TARTRATE SCH MG: 25 TABLET, FILM COATED ORAL at 08:40

## 2017-08-18 RX ADMIN — INSULIN LISPRO SCH UNIT: 100 INJECTION, SOLUTION INTRAVENOUS; SUBCUTANEOUS at 12:26

## 2017-08-18 RX ADMIN — HALOPERIDOL PRN MG: 1 TABLET ORAL at 16:28

## 2017-08-18 RX ADMIN — POTASSIUM CHLORIDE SCH MEQ: 20 TABLET, EXTENDED RELEASE ORAL at 15:14

## 2017-08-18 RX ADMIN — CEFAZOLIN SCH MLS/HR: 330 INJECTION, POWDER, FOR SOLUTION INTRAMUSCULAR; INTRAVENOUS at 12:26

## 2017-08-18 RX ADMIN — APIXABAN SCH MG: 2.5 TABLET, FILM COATED ORAL at 19:54

## 2017-08-18 RX ADMIN — MIRTAZAPINE SCH MG: 15 TABLET, FILM COATED ORAL at 19:54

## 2017-08-18 RX ADMIN — BUDESONIDE AND FORMOTEROL FUMARATE DIHYDRATE SCH PUFF: 80; 4.5 AEROSOL RESPIRATORY (INHALATION) at 21:15

## 2017-08-18 RX ADMIN — POTASSIUM CHLORIDE SCH MEQ: 20 TABLET, EXTENDED RELEASE ORAL at 16:28

## 2017-08-18 RX ADMIN — APIXABAN SCH MG: 2.5 TABLET, FILM COATED ORAL at 08:36

## 2017-08-18 RX ADMIN — IPRATROPIUM BROMIDE AND ALBUTEROL SULFATE PRN ML: .5; 3 SOLUTION RESPIRATORY (INHALATION) at 08:53

## 2017-08-18 RX ADMIN — FOLIC ACID SCH MG: 1 TABLET ORAL at 08:36

## 2017-08-18 NOTE — P.PN
Subjective


Principal diagnosis: 





Acute ischemic vertebrobasilar artery brainstem stroke


This is a pleasant 84-year-old male patient who follows with Dr. Black as 

his primary care physician.  He does have history of chronic bronchial asthma, 

arthritis, right foot drop, chronic back pain, prostate cancer treated with 

therapy.  He does have a history of ESBL E. coli and had been seen by Dr. Jose 

E he had a PICC line placed and was on IV Invanz and June 2017. He also has a 

history of incomplete bladder emptying secondary to urethral stricture and is 

status post dilation on 07/12/2017.  08/16/2017 the patient was brought to the 

emergency room with right-sided weakness.  His daughter had noted him to be 

eating with his left arm and he is usually right-handed.  He also was utilizing 

his arms to move his right leg which was unusual for him.  A code stroke was 

called and the patient was evaluated by neurology and felt not to be a 

candidate for TPA.  CT angiogram of the neck and brain were negative.  X-ray of 

the right hand showed features of inflammatory arthritis which could be long-

standing rheumatoid arthritis versus erosive osteoarthritis.  No acute bony 

abnormalities were noted.  MRI of the brain revealed age-related atrophic and 

chronic small vessel ischemic changes but no acute intracranial process.  There 

was a high frontal right meningioma.  Chronic sinusitis and mastoiditis.  

Carotid Dopplers revealed no significant stenosis.  Echocardiogram revealed 

preserved left ventricular systolic function with estimated ejection fraction 55

-60%.  The patient was also found to be in atrial fibrillation with rapid 

ventricular response, since converted to normal sinus rhythm.  Cardiology and 

neurology are on the case.  His chest x-ray showed no acute pulmonary process.  

He is maintaining good O2 saturations in the mid to upper 90s on room air.  He 

has been afebrile.  The patient is a poor historian.





Patient was reevaluated today on 8/18/2017, has been seen by many consultants 

regarding his acute CVA, he was also seen by us for underlying COPD/asthma.  

Pulmonary-wise, the patient is relatively asymptomatic, no cough, no wheezing 

no shortness of breath.  MRI showed no acute changes.  However Dr. Noriega raise 

the possibility of possible ischemia involving the left luciano.  His atrial 

fibrillation is being addressed by cardiology.  And the patient will require 

anticoagulation for stroke prevention.











Objective





- Vital Signs


Vital signs: 


 Vital Signs











Temp  96.9 F L  08/18/17 08:00


 


Pulse  72   08/18/17 09:09


 


Resp  17   08/18/17 08:00


 


BP  125/62   08/18/17 08:00


 


Pulse Ox  97   08/18/17 08:00








 Intake & Output











 08/17/17 08/18/17 08/18/17





 18:59 06:59 18:59


 


Intake Total 1080 450 100


 


Output Total 300 300 300


 


Balance 780 150 -200


 


Weight 58.2 kg 59.6 kg 


 


Intake:   


 


    


 


    Sodium Chloride 0.9% 1, 600  





    000 ml @ 75 mls/hr IV .   





    [id number] SRIDHAR Rx#:[id number]   


 


  Intake, IV Titration  450 





  Amount   


 


    Sodium Chloride 0.9% 1,  450 





    000 ml @ 75 mls/hr IV .   





    [id number] SRIDHAR Rx#:[id number]   


 


  Oral 480  100


 


Output:   


 


  Urine 300 300 300


 


Other:   


 


  Voiding Method Urinal  Diaper





 Incontinent  Incontinent


 


  # Voids  1 














- Exam


PHYSICAL EXAMINATION: Patient is resting comfortably in bed. 


VITAL SIGNS: Blood pressure is [127/65]. Heart rate is [69]. Respiration is [16]

. Temperature is [97.9].


HEENT: Head is atraumatic, neck is supple, there were no carotid bruits.


CHEST: Lungs are clear to auscultation and percussion.  


CARDIAC: S1, S2 normal rate and rhythm. There is no murmur.


ABDOMEN: Soft and nontender. Bowel sounds are present.


EXTREMITIES:  There is no pedal edema.  Peripheral pulses are present.





Neurologic: Please refer to full urological evaluation by neurology.  Patient 

has unsteady gait.











- Labs


CBC & Chem 7: 


 08/18/17 05:07





 08/18/17 05:07


Labs: 


 Abnormal Lab Results - Last 24 Hours (Table)











  08/17/17 08/17/17 08/17/17 Range/Units





  17:58 17:58 18:09 


 


RBC     (4.30-5.90)  m/uL


 


Hgb     (13.0-17.5)  gm/dL


 


Hct     (39.0-53.0)  %


 


Neutrophils #     (1.3-7.7)  k/uL


 


Lymphocytes #     (1.0-4.8)  k/uL


 


ESR   95 H   (0-15)  mm/hr


 


Potassium     (3.5-5.1)  mmol/L


 


Creatinine     (0.66-1.25)  mg/dL


 


Glucose     (74-99)  mg/dL


 


POC Glucose (mg/dL)     (75-99)  mg/dL


 


Calcium     (8.4-10.2)  mg/dL


 


Creatine Kinase  47 L    ()  U/L


 


C-Reactive Protein  209.8 H    (<10.0)  mg/L


 


Vitamin D 25-Hydroxy     (30.0-100.0)  ng/mL


 


Cyclic Citrull Peptide    POSITIVE H  (NEGATIVE)  


 


Free Kappa LC, Quant     (0.33-1.94)  mg/dL


 


Free Lambda LC, Quant     (0.57-2.63)  mg/dL














  08/17/17 08/17/17 08/17/17 Range/Units





  18:09 18:09 20:24 


 


RBC     (4.30-5.90)  m/uL


 


Hgb     (13.0-17.5)  gm/dL


 


Hct     (39.0-53.0)  %


 


Neutrophils #     (1.3-7.7)  k/uL


 


Lymphocytes #     (1.0-4.8)  k/uL


 


ESR     (0-15)  mm/hr


 


Potassium     (3.5-5.1)  mmol/L


 


Creatinine     (0.66-1.25)  mg/dL


 


Glucose     (74-99)  mg/dL


 


POC Glucose (mg/dL)    142 H  (75-99)  mg/dL


 


Calcium     (8.4-10.2)  mg/dL


 


Creatine Kinase     ()  U/L


 


C-Reactive Protein     (<10.0)  mg/L


 


Vitamin D 25-Hydroxy   22.5 L   (30.0-100.0)  ng/mL


 


Cyclic Citrull Peptide     (NEGATIVE)  


 


Free Kappa LC, Quant  4.13 H    (0.33-1.94)  mg/dL


 


Free Lambda LC, Quant  3.20 H    (0.57-2.63)  mg/dL














  08/18/17 08/18/17 08/18/17 Range/Units





  05:07 05:07 05:54 


 


RBC  3.44 L    (4.30-5.90)  m/uL


 


Hgb  10.5 L    (13.0-17.5)  gm/dL


 


Hct  31.8 L    (39.0-53.0)  %


 


Neutrophils #  9.2 H    (1.3-7.7)  k/uL


 


Lymphocytes #  0.3 L    (1.0-4.8)  k/uL


 


ESR     (0-15)  mm/hr


 


Potassium   3.2 L   (3.5-5.1)  mmol/L


 


Creatinine   0.51 L   (0.66-1.25)  mg/dL


 


Glucose   137 H   (74-99)  mg/dL


 


POC Glucose (mg/dL)    148 H  (75-99)  mg/dL


 


Calcium   8.2 L   (8.4-10.2)  mg/dL


 


Creatine Kinase     ()  U/L


 


C-Reactive Protein     (<10.0)  mg/L


 


Vitamin D 25-Hydroxy     (30.0-100.0)  ng/mL


 


Cyclic Citrull Peptide     (NEGATIVE)  


 


Free Kappa LC, Quant     (0.33-1.94)  mg/dL


 


Free Lambda LC, Quant     (0.57-2.63)  mg/dL








 Microbiology - Last 24 Hours (Table)











 08/16/17 22:35 Urine Culture - Final





 Urine,Voided 














Assessment and Plan


Plan: 





#1 Right-sided weakness with suspected acute CVA/TIA.





#2 New-onset atrial fibrillation with rapid ventricular response.  Initiated on 

beta blockers and Eliquis.





#3 Chronic mild persistent bronchial asthma, currently inactive and stable.





#4 Urinary retention secondary to urethral constriction, status post dilatation 

in July 2017.





#5 Recent ESBL E. coli urinary tract infection treated with IV Invanz, PICC 

line since removed.  Repeat culture in progress.





#6 Chronic back pain with previous surgery and noted right foot drop.





#7 Rheumatoid arthritis.  Rheumatoid factor XVIII.





#8 Recent 15 pound weight loss secondary to poor appetite.





#9 Poor overall functional performance secondary to the above-mentioned 

multiple comorbidities.





Recommendation: Continue present treatment plan and supportive care measures, 

no active pulmonary issues, we will follow on when necessary basis.








Time with Patient: Less than 30

## 2017-08-18 NOTE — EEG
DATE OF SERVICE:  08/17/2017



ELECTROENCEPHALOGRAPHIC EXAMINATION REPORT



INDICATION FOR EXAMINATION:  This patient is an 84-year-old male being 
evaluated for right-sided weakness and possible stroke. 



Age:  84.



EEG FINDINGS:  A routine 21 channel awake digital EEG recording was 
accomplished utilizing the 10-20 international system with bipolar and 
referential montages. The background activity in the most alert, resting state 
consists of a low to medium amplitude, fairly well developed and well sustained 
5-6 Hz activity over the posterior head regions. This posterior rhythm 
attenuates to eye opening.  There is a small amount of low amplitude 18-20 Hz 
beta activity seen maximally over the anterior head regions. Muscle and 
movement artifact was observed on a few occasions during the tracing. 



Hyperventilation was not performed. Photic stimulation at flash frequencies of 2
-30 Hz produced a minimal occipital driving response.  No epileptiform 
discharges were seen. 



IMPRESSION: This EEG is moderately abnormal in a diffuse fashion due to slowing 
of the EEG background. The EEG failed to reveal any focal, lateralized or 
epileptiform abnormalities. Clinical correlation is recommended.
JASOND

## 2017-08-18 NOTE — P.PN
Subjective





This patient is a 84-year-old right-handed white male who was seen in neurology 

consultation yesterday for evaluation of possible stroke.  Patient had 

undergone an initial evaluation in the ER due to increased symptoms of right-

sided weakness and left facial droop.  Patient underwent an initial computed 

tomography scan of the brain which was negative for any acute findings.  He was 

sent for MRI of the brain today which is reported to show only age-related 

atrophy and chronic small vessel ischemic changes.  There was evidence of a 

high bright frontal meningioma.  MRI was done with and without gadolinium.  No 

other acute changes were noted.  Review of the actual MRI films however reveals 

a area of possible ischemia involving the left luciano.  We will review this MRI 

films with the radiologist tomorrow for further assessment.  The patient has 

been seen by cardiology today.  His EKG showed atrial fibrillation with a 

moderately rapid ventricular response with nonspecific ST-T wave changes.  

Patient is to undergo an echocardiogram study.  Patient will require 

anticoagulation for stroke prevention.  Cardiology is recommending Eliquis as 

possible further treatment for anticoagulation.  Patient was taken off of 

aspirin by cardiology.  He was sent for MRI of the brain today as noted above 

revealed only age-related atrophy.  We are waiting to have a second evaluation 

of this MRI tomorrow by radiology.  Overall the patient seems to be relatively 

stable.  He did undergo some laboratory testing today and results were 

reviewed.  His C-reactive protein is quite elevated at 190.3.  His rheumatoid 

factor is positive at 18.0.  CEA antigen came back within the normal range at 

2.3.  Total cholesterol was 106.  We have reviewed all of these test results 

today with the patient and his daughter in detail.  They were updated on the 

MRI results. Patient seems to still have episodes of confusion and 

disorientation especially at night.  He apparently became very agitated last 

night and required some restraints.  He is doing better today and is not 

combative or agitated.  He is currently being anticoagulated for the new onset 

atrial fibrillation.   We will continue close follow-up with the patient during 

this admission.  His overall prognosis at this time remains guarded.





Objective





- Vital Signs


Vital signs: 


 Vital Signs











Temp  96.9 F L  08/18/17 08:00


 


Pulse  72   08/18/17 09:09


 


Resp  17   08/18/17 08:00


 


BP  125/62   08/18/17 08:00


 


Pulse Ox  97   08/18/17 08:00








 Intake & Output











 08/17/17 08/18/17 08/18/17





 18:59 06:59 18:59


 


Intake Total 1080 450 100


 


Output Total 300 300 300


 


Balance 780 150 -200


 


Weight 58.2 kg 59.6 kg 


 


Intake:   


 


    


 


    Sodium Chloride 0.9% 1, 600  





    000 ml @ 75 mls/hr IV .   





    [id number] SRIDHAR Rx#:[id number]   


 


  Intake, IV Titration  450 





  Amount   


 


    Sodium Chloride 0.9% 1,  450 





    000 ml @ 75 mls/hr IV .   





    [id number] SRIDHAR Rx#:[id number]   


 


  Oral 480  100


 


Output:   


 


  Urine 300 300 300


 


Other:   


 


  Voiding Method Urinal  Diaper





 Incontinent  Incontinent


 


  # Voids  1 














- Exam





Physical examination:





PHYSICAL EXAMINATION: Patient is resting comfortably in bed. 


VITAL SIGNS: Blood pressure is [125/62]. Heart rate is [77]. Respiration is [17]

. Temperature is [97.0].


HEENT: Head is atraumatic, neck is supple, there were no carotid bruits.


CHEST: Lungs are clear to auscultation and percussion.  


CARDIAC: S1, S2 normal rate and rhythm. There is no murmur.


ABDOMEN: Soft and nontender. Bowel sounds are present.


EXTREMITIES:  There is no pedal edema.  Peripheral pulses are present.





Neurological examination:





Patient's neurological examination is unchanged from yesterday.  Patient has 

been up and ambulating in his room.  He does have some unsteady gait.





- Labs


CBC & Chem 7: 


 08/18/17 05:07





 08/18/17 19:23


Labs: 


 Abnormal Lab Results - Last 24 Hours (Table)











  08/17/17 08/17/17 08/17/17 Range/Units





  17:58 17:58 18:09 


 


RBC     (4.30-5.90)  m/uL


 


Hgb     (13.0-17.5)  gm/dL


 


Hct     (39.0-53.0)  %


 


Neutrophils #     (1.3-7.7)  k/uL


 


Lymphocytes #     (1.0-4.8)  k/uL


 


ESR   95 H   (0-15)  mm/hr


 


Potassium     (3.5-5.1)  mmol/L


 


Creatinine     (0.66-1.25)  mg/dL


 


Glucose     (74-99)  mg/dL


 


POC Glucose (mg/dL)     (75-99)  mg/dL


 


Calcium     (8.4-10.2)  mg/dL


 


Creatine Kinase  47 L    ()  U/L


 


C-Reactive Protein  209.8 H    (<10.0)  mg/L


 


Vitamin D 25-Hydroxy     (30.0-100.0)  ng/mL


 


Cyclic Citrull Peptide    POSITIVE H  (NEGATIVE)  


 


Free Kappa LC, Quant     (0.33-1.94)  mg/dL


 


Free Lambda LC, Quant     (0.57-2.63)  mg/dL














  08/17/17 08/17/17 08/17/17 Range/Units





  18:09 18:09 20:24 


 


RBC     (4.30-5.90)  m/uL


 


Hgb     (13.0-17.5)  gm/dL


 


Hct     (39.0-53.0)  %


 


Neutrophils #     (1.3-7.7)  k/uL


 


Lymphocytes #     (1.0-4.8)  k/uL


 


ESR     (0-15)  mm/hr


 


Potassium     (3.5-5.1)  mmol/L


 


Creatinine     (0.66-1.25)  mg/dL


 


Glucose     (74-99)  mg/dL


 


POC Glucose (mg/dL)    142 H  (75-99)  mg/dL


 


Calcium     (8.4-10.2)  mg/dL


 


Creatine Kinase     ()  U/L


 


C-Reactive Protein     (<10.0)  mg/L


 


Vitamin D 25-Hydroxy   22.5 L   (30.0-100.0)  ng/mL


 


Cyclic Citrull Peptide     (NEGATIVE)  


 


Free Kappa LC, Quant  4.13 H    (0.33-1.94)  mg/dL


 


Free Lambda LC, Quant  3.20 H    (0.57-2.63)  mg/dL














  08/18/17 08/18/17 08/18/17 Range/Units





  05:07 05:07 05:54 


 


RBC  3.44 L    (4.30-5.90)  m/uL


 


Hgb  10.5 L    (13.0-17.5)  gm/dL


 


Hct  31.8 L    (39.0-53.0)  %


 


Neutrophils #  9.2 H    (1.3-7.7)  k/uL


 


Lymphocytes #  0.3 L    (1.0-4.8)  k/uL


 


ESR     (0-15)  mm/hr


 


Potassium   3.2 L   (3.5-5.1)  mmol/L


 


Creatinine   0.51 L   (0.66-1.25)  mg/dL


 


Glucose   137 H   (74-99)  mg/dL


 


POC Glucose (mg/dL)    148 H  (75-99)  mg/dL


 


Calcium   8.2 L   (8.4-10.2)  mg/dL


 


Creatine Kinase     ()  U/L


 


C-Reactive Protein     (<10.0)  mg/L


 


Vitamin D 25-Hydroxy     (30.0-100.0)  ng/mL


 


Cyclic Citrull Peptide     (NEGATIVE)  


 


Free Kappa LC, Quant     (0.33-1.94)  mg/dL


 


Free Lambda LC, Quant     (0.57-2.63)  mg/dL














  08/18/17 Range/Units





  12:02 


 


RBC   (4.30-5.90)  m/uL


 


Hgb   (13.0-17.5)  gm/dL


 


Hct   (39.0-53.0)  %


 


Neutrophils #   (1.3-7.7)  k/uL


 


Lymphocytes #   (1.0-4.8)  k/uL


 


ESR   (0-15)  mm/hr


 


Potassium   (3.5-5.1)  mmol/L


 


Creatinine   (0.66-1.25)  mg/dL


 


Glucose   (74-99)  mg/dL


 


POC Glucose (mg/dL)  147 H  (75-99)  mg/dL


 


Calcium   (8.4-10.2)  mg/dL


 


Creatine Kinase   ()  U/L


 


C-Reactive Protein   (<10.0)  mg/L


 


Vitamin D 25-Hydroxy   (30.0-100.0)  ng/mL


 


Cyclic Citrull Peptide   (NEGATIVE)  


 


Free Kappa LC, Quant   (0.33-1.94)  mg/dL


 


Free Lambda LC, Quant   (0.57-2.63)  mg/dL








 Microbiology - Last 24 Hours (Table)











 08/16/17 22:35 Urine Culture - Final





 Urine,Voided 














Assessment and Plan


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


Status: Acute   Code(s): I63.219 - CEREB INFRC DUE TO UNSP OCCLS OR STENOSIS OF 

UNSP VERTEB ART; I63.22 - CEREBRAL INFRC DUE TO UNSP OCCLS OR STENOSIS OF 

BASILAR ART   





(2) New onset atrial fibrillation


Status: Acute   Code(s): I48.91 - UNSPECIFIED ATRIAL FIBRILLATION   





(3) Right foot drop


Status: Acute   Code(s): M21.371 - FOOT DROP, RIGHT FOOT   





(4) History of laminectomy


Status: Acute   Code(s): Z98.890 - OTHER SPECIFIED POSTPROCEDURAL STATES   


Plan: 





This patient is a pleasant 84-year-old right-handed white male who was admitted 

to Hospital with symptoms of right-sided weakness and left facial droop.  He 

was brought into the emergency room and was evaluated by the neuro 

interventional group at [facility].  The stroke robot was 

activated and he was evaluated.  Dr. Olivarez reviewed his computed tomography 

scan of the brain and CTA angiogram results and felt he was not a candidate for 

TPA.  He was admitted to hospital for further neurological assessment and 

treatment.  His neurological exam findings at this time revealed right 

hemiparesis with a left facial weakness pattern.  This findings suggest cross 

neurological localization suggesting possibility of brainstem ischemia or 

brainstem stroke.  We have recommended an MRI of the brain for further 

evaluation.  His CTA angiogram was reported negative for any evidence of any 

arterial stenosis.  We will continue with close neurological follow-up for this 

patient during this admission.  He is to be maintained on aspirin therapy for 

secondary stroke prevention at this time pending his stroke workup.  The 

patient underwent MRI of the brain today for further evaluation of possible 

brainstem ischemia.  This MRI is reported negative for any evidence of acute 

stroke.  Patient was seen by cardiology regarding his new onset atrial 

fibrillation.  We will await further recommendations.  We did review the 

results of the MRI today with the patient in detail.  He underwent a routine 

EEG today as well which was reviewed.  EEG is moderately slow.  No evidence of 

any epileptiform discharges were seen.  We have reviewed all of these test 

results today in detail with the patient daughter and she is aware of these 

findings.  We did review his MRI films today with Dr. Rosales and there does 

appear to be possible early ischemia in the left luciano, suggesting infarction. 

We will continue close neurological follow-up of this patient during this 

admission.  Patient did have evidence of elevated C-reactive protein.  

Rheumatology has been consulted. He is being evaluated for possible autoimmune 

disorder.  We will continue close neurologic follow-up for this patient.   His 

overall prognosis at this time remains guarded.

## 2017-08-18 NOTE — P.PN
Subjective


Principal diagnosis: 





Paroxysmal A. fib





This is a pleasant 84-year-old gentleman who was admitted to the hospital with 

symptoms of right-sided weakness and was diagnosed with TIA.  He was found to 

be in A. fib with RVR which was a newly diagnosed atrial fibrillation but the 

patient was subsequently converted to normal sinus mechanism.





On follow-up with him today, he denies having any chest pain or discomfort for 

or difficulty in breathing.





The neurology service has been following up with the patient as well as 

regarding the TIA/CVA.





The patient was started on anticoagulation using Eliquis.  Beside that he has 

been maintaining normal sinus mechanism.





He underwent an echocardiogram which showed normal LV function without any 

significant valvular abnormalities.





Objective





- Vital Signs


Vital signs: 


 Vital Signs











Temp  96.9 F L  08/18/17 08:00


 


Pulse  72   08/18/17 09:09


 


Resp  17   08/18/17 08:00


 


BP  125/62   08/18/17 08:00


 


Pulse Ox  97   08/18/17 08:00








 Intake & Output











 08/17/17 08/18/17 08/18/17





 18:59 06:59 18:59


 


Intake Total 1080 450 


 


Output Total 300 300 


 


Balance 780 150 


 


Weight 58.2 kg 59.6 kg 


 


Intake:   


 


    


 


    Sodium Chloride 0.9% 1, 600  





    000 ml @ 75 mls/hr IV .   





    [id number] [facility] Rx#:[id number]   


 


  Intake, IV Titration  450 





  Amount   


 


    Sodium Chloride 0.9% 1,  450 





    000 ml @ 75 mls/hr IV .   





    [id number] [facility] Rx#:[id number]   


 


  Oral 480  


 


Output:   


 


  Urine 300 300 


 


Other:   


 


  Voiding Method Urinal  Diaper





 Incontinent  Incontinent


 


  # Voids  1 














- Constitutional


General appearance: Present: no acute distress





- Respiratory


Respiratory: bilateral: CTA





- Cardiovascular


Rhythm: regular


Heart sounds: normal: S1, S2





- Labs


CBC & Chem 7: 


 08/18/17 05:07





 08/18/17 05:07


Labs: 


 Abnormal Lab Results - Last 24 Hours (Table)











  08/17/17 08/17/17 08/17/17 Range/Units





  05:28 05:28 17:58 


 


RBC     (4.30-5.90)  m/uL


 


Hgb     (13.0-17.5)  gm/dL


 


Hct     (39.0-53.0)  %


 


Neutrophils #     (1.3-7.7)  k/uL


 


Lymphocytes #     (1.0-4.8)  k/uL


 


ESR   78 H   (0-15)  mm/hr


 


Potassium     (3.5-5.1)  mmol/L


 


Creatinine     (0.66-1.25)  mg/dL


 


Glucose     (74-99)  mg/dL


 


POC Glucose (mg/dL)     (75-99)  mg/dL


 


Calcium     (8.4-10.2)  mg/dL


 


Creatine Kinase    47 L  ()  U/L


 


C-Reactive Protein  190.3 H   209.8 H  (<10.0)  mg/L


 


Vitamin D 25-Hydroxy     (30.0-100.0)  ng/mL


 


Cyclic Citrull Peptide     (NEGATIVE)  














  08/17/17 08/17/17 08/17/17 Range/Units





  17:58 18:09 18:09 


 


RBC     (4.30-5.90)  m/uL


 


Hgb     (13.0-17.5)  gm/dL


 


Hct     (39.0-53.0)  %


 


Neutrophils #     (1.3-7.7)  k/uL


 


Lymphocytes #     (1.0-4.8)  k/uL


 


ESR  95 H    (0-15)  mm/hr


 


Potassium     (3.5-5.1)  mmol/L


 


Creatinine     (0.66-1.25)  mg/dL


 


Glucose     (74-99)  mg/dL


 


POC Glucose (mg/dL)     (75-99)  mg/dL


 


Calcium     (8.4-10.2)  mg/dL


 


Creatine Kinase     ()  U/L


 


C-Reactive Protein     (<10.0)  mg/L


 


Vitamin D 25-Hydroxy    22.5 L  (30.0-100.0)  ng/mL


 


Cyclic Citrull Peptide   POSITIVE H   (NEGATIVE)  














  08/17/17 08/18/17 08/18/17 Range/Units





  20:24 05:07 05:07 


 


RBC   3.44 L   (4.30-5.90)  m/uL


 


Hgb   10.5 L   (13.0-17.5)  gm/dL


 


Hct   31.8 L   (39.0-53.0)  %


 


Neutrophils #   9.2 H   (1.3-7.7)  k/uL


 


Lymphocytes #   0.3 L   (1.0-4.8)  k/uL


 


ESR     (0-15)  mm/hr


 


Potassium    3.2 L  (3.5-5.1)  mmol/L


 


Creatinine    0.51 L  (0.66-1.25)  mg/dL


 


Glucose    137 H  (74-99)  mg/dL


 


POC Glucose (mg/dL)  142 H    (75-99)  mg/dL


 


Calcium    8.2 L  (8.4-10.2)  mg/dL


 


Creatine Kinase     ()  U/L


 


C-Reactive Protein     (<10.0)  mg/L


 


Vitamin D 25-Hydroxy     (30.0-100.0)  ng/mL


 


Cyclic Citrull Peptide     (NEGATIVE)  














  08/18/17 Range/Units





  05:54 


 


RBC   (4.30-5.90)  m/uL


 


Hgb   (13.0-17.5)  gm/dL


 


Hct   (39.0-53.0)  %


 


Neutrophils #   (1.3-7.7)  k/uL


 


Lymphocytes #   (1.0-4.8)  k/uL


 


ESR   (0-15)  mm/hr


 


Potassium   (3.5-5.1)  mmol/L


 


Creatinine   (0.66-1.25)  mg/dL


 


Glucose   (74-99)  mg/dL


 


POC Glucose (mg/dL)  148 H  (75-99)  mg/dL


 


Calcium   (8.4-10.2)  mg/dL


 


Creatine Kinase   ()  U/L


 


C-Reactive Protein   (<10.0)  mg/L


 


Vitamin D 25-Hydroxy   (30.0-100.0)  ng/mL


 


Cyclic Citrull Peptide   (NEGATIVE)  








 Microbiology - Last 24 Hours (Table)











 08/16/17 22:35 Urine Culture - Preliminary





 Urine,Voided 














Assessment and Plan


Plan: 





This is a pleasant 84-year-old gentleman who was admitted to the hospital with 

an episode of TIA and was found to be in A. fib and subsequently converted to 

normal sinus mechanism.





The patient has been maintaining normal sinus mechanism.  The echocardiogram 

showed normal LV function.





We will continue oral anticoagulation as well as metoprolol.

## 2017-08-18 NOTE — CDI
In responding to this query, please exercise your independent professional 
judgment. The [facility] Coding Staff and Clinical Documentation Specialists 
appreciate your 

assistance in clarifying documentation, maintaining compliance with coding 
guidelines, accurately documenting patients condition and capturing severity 
of illness. The fact that a question is asked does not imply that any 
particular answer is desired or expected. Communication forms are a method of 
clarifying documentation and are not made part of the Legal Health Record. 
Thank you in advance for your clarification.

 Last Revision, November 2015



Debbie [place]

[address] [place] [address]



Documentation Clarification Form



Date: 8/18/2017 11:42:00 AM

From: Calra Moore RN, CDS

Phone:  [phone]

MRN: [id number]

Admit Date: 8/16/2017 11:25:00 PM

Patient Name: Betty Montejo

Visit Number: [id number]



Dr. Joey Kamara, 



84 year old patient admitted for possible acute stroke. Patient has had 
involuntary weight loss of 25% over one year.

Malnutrition has been documented in your H&P.  



History/Risk Factors: age > 65, history of prostate cancer, 

Clinical Indicators:  BMI 17.3, involuntary weight loss of 25%, weight at 72% 
of ideal body weight, per Dietitian consult. Total Protein 6.9, Albumin 3.4

Treatment: Dietitian consult, magic cups TIS, 





In your professional opinion, can you please clarify if these findings signify 
one of the following conditions? 



   Mild Protein-Calorie Malnutrition 

   Moderate Protein-Calorie Malnutrition

   Severe Protein-Calorie Malnutrition

   Other condition, please specify

   Unable to determine



Please document in your progress notes and discharge summary in order to 
capture severity of illness and risk of mortality. Include clinical findings 
that support your diagnosis.



FYI: Press F11 to launch patient chart.



Thank you.
ZAYNAB

## 2017-08-19 LAB
ANION GAP SERPL CALC-SCNC: 6 MMOL/L
BASOPHILS # BLD AUTO: 0 K/UL (ref 0–0.2)
BASOPHILS NFR BLD AUTO: 0 %
BUN SERPL-SCNC: 11 MG/DL (ref 9–20)
CALCIUM SPEC-MCNC: 8.1 MG/DL (ref 8.4–10.2)
CH: 31
CHCM: 33.7
CHLORIDE SERPL-SCNC: 108 MMOL/L (ref 98–107)
CO2 SERPL-SCNC: 27 MMOL/L (ref 22–30)
EOSINOPHIL # BLD AUTO: 0 K/UL (ref 0–0.7)
EOSINOPHIL NFR BLD AUTO: 0 %
ERYTHROCYTE [DISTWIDTH] IN BLOOD BY AUTOMATED COUNT: 3.51 M/UL (ref 4.3–5.9)
ERYTHROCYTE [DISTWIDTH] IN BLOOD: 13.9 % (ref 11.5–15.5)
GLUCOSE BLD-MCNC: 134 MG/DL (ref 75–99)
GLUCOSE BLD-MCNC: 186 MG/DL (ref 75–99)
GLUCOSE BLD-MCNC: 203 MG/DL (ref 75–99)
GLUCOSE BLD-MCNC: 95 MG/DL (ref 75–99)
GLUCOSE SERPL-MCNC: 76 MG/DL (ref 74–99)
HCT VFR BLD AUTO: 32.4 % (ref 39–53)
HDW: 2.87
HGB BLD-MCNC: 10.3 GM/DL (ref 13–17.5)
HLA B27 COMMENT: (no result)
HLA-B27 QL: NEGATIVE
LUC NFR BLD AUTO: 1 %
LYMPHOCYTES # SPEC AUTO: 0.8 K/UL (ref 1–4.8)
LYMPHOCYTES NFR SPEC AUTO: 9 %
MAGNESIUM SPEC-SCNC: 2.2 MG/DL (ref 1.6–2.3)
MCH RBC QN AUTO: 29.4 PG (ref 25–35)
MCHC RBC AUTO-ENTMCNC: 31.9 G/DL (ref 31–37)
MCV RBC AUTO: 92.2 FL (ref 80–100)
MONOCYTES # BLD AUTO: 0.8 K/UL (ref 0–1)
MONOCYTES NFR BLD AUTO: 9 %
NEUTROPHILS # BLD AUTO: 7.8 K/UL (ref 1.3–7.7)
NEUTROPHILS NFR BLD AUTO: 82 %
NON-AFRICAN AMERICAN GFR(MDRD): >60
POTASSIUM SERPL-SCNC: 3.3 MMOL/L (ref 3.5–5.1)
SODIUM SERPL-SCNC: 141 MMOL/L (ref 137–145)
WBC # BLD AUTO: 0.08 10*3/UL
WBC # BLD AUTO: 9.5 K/UL (ref 3.8–10.6)
WBC (PEROX): 9.74

## 2017-08-19 RX ADMIN — CEFAZOLIN SCH MLS/HR: 330 INJECTION, POWDER, FOR SOLUTION INTRAMUSCULAR; INTRAVENOUS at 04:25

## 2017-08-19 RX ADMIN — MIRTAZAPINE SCH MG: 15 TABLET, FILM COATED ORAL at 19:37

## 2017-08-19 RX ADMIN — POTASSIUM CHLORIDE SCH MEQ: 20 TABLET, EXTENDED RELEASE ORAL at 15:55

## 2017-08-19 RX ADMIN — POTASSIUM CHLORIDE SCH MEQ: 20 TABLET, EXTENDED RELEASE ORAL at 17:04

## 2017-08-19 RX ADMIN — METOPROLOL TARTRATE SCH: 25 TABLET, FILM COATED ORAL at 10:48

## 2017-08-19 RX ADMIN — HALOPERIDOL PRN MG: 1 TABLET ORAL at 03:16

## 2017-08-19 RX ADMIN — MEGESTROL ACETATE SCH MG: 40 SUSPENSION ORAL at 10:48

## 2017-08-19 RX ADMIN — APIXABAN SCH MG: 2.5 TABLET, FILM COATED ORAL at 10:48

## 2017-08-19 RX ADMIN — BUDESONIDE AND FORMOTEROL FUMARATE DIHYDRATE SCH PUFF: 80; 4.5 AEROSOL RESPIRATORY (INHALATION) at 09:20

## 2017-08-19 RX ADMIN — PANTOPRAZOLE SODIUM SCH MG: 40 TABLET, DELAYED RELEASE ORAL at 06:28

## 2017-08-19 RX ADMIN — CEFAZOLIN SCH MLS/HR: 330 INJECTION, POWDER, FOR SOLUTION INTRAMUSCULAR; INTRAVENOUS at 19:36

## 2017-08-19 RX ADMIN — FOLIC ACID SCH MG: 1 TABLET ORAL at 10:48

## 2017-08-19 RX ADMIN — METOPROLOL TARTRATE SCH MG: 25 TABLET, FILM COATED ORAL at 19:37

## 2017-08-19 RX ADMIN — POTASSIUM CHLORIDE SCH MEQ: 20 TABLET, EXTENDED RELEASE ORAL at 10:48

## 2017-08-19 RX ADMIN — APIXABAN SCH MG: 2.5 TABLET, FILM COATED ORAL at 19:37

## 2017-08-19 RX ADMIN — POTASSIUM CHLORIDE SCH MEQ: 20 TABLET, EXTENDED RELEASE ORAL at 12:10

## 2017-08-19 RX ADMIN — BUDESONIDE AND FORMOTEROL FUMARATE DIHYDRATE SCH PUFF: 80; 4.5 AEROSOL RESPIRATORY (INHALATION) at 19:57

## 2017-08-19 NOTE — P.PN
Subjective





This patient is a 84-year-old right-handed white male who was seen in neurology 

consultation yesterday for evaluation of possible stroke.  Patient had 

undergone an initial evaluation in the ER due to increased symptoms of right-

sided weakness and left facial droop.  Patient underwent an initial computed 

tomography scan of the brain which was negative for any acute findings.  He was 

sent for MRI of the brain today which is reported to show only age-related 

atrophy and chronic small vessel ischemic changes.  There was evidence of a 

high bright frontal meningioma.  MRI was done with and without gadolinium.  No 

other acute changes were noted.  Review of the actual MRI films however reveals 

a area of possible ischemia involving the left luciano.  We will review this MRI 

films with the radiologist tomorrow for further assessment.  The patient has 

been seen by cardiology today.  His EKG showed atrial fibrillation with a 

moderately rapid ventricular response with nonspecific ST-T wave changes.  

Patient is to undergo an echocardiogram study.  Patient will require 

anticoagulation for stroke prevention.  Cardiology is recommending Eliquis as 

possible further treatment for anticoagulation.  Patient was taken off of 

aspirin by cardiology.  He was sent for MRI of the brain today as noted above 

revealed only age-related atrophy.  We are waiting to have a second evaluation 

of this MRI tomorrow by radiology.  Overall the patient seems to be relatively 

stable.  He did undergo some laboratory testing today and results were 

reviewed.  His C-reactive protein is quite elevated at 190.3.  His rheumatoid 

factor is positive at 18.0.  CEA antigen came back within the normal range at 

2.3.  Total cholesterol was 106.  We have reviewed all of these test results 

today with the patient and his daughter in detail.  They were updated on the 

MRI results. Patient seems to still have episodes of confusion and 

disorientation especially at night.  He apparently became very agitated last 

night and required some restraints.  He is doing better today and is not 

combative or agitated.  He is currently being anticoagulated for the new onset 

atrial fibrillation.  Patient does appear to be doing better today as compared 

to yesterday.  He seems to be following commands more easily.  We will continue 

to monitor him closely over the weekend.  We will continue close follow-up with 

the patient during this admission.  His overall prognosis at this time remains 

guarded.





Objective





- Vital Signs


Vital signs: 


 Vital Signs











Temp  97.1 F L  08/19/17 16:00


 


Pulse  59 L  08/19/17 16:00


 


Resp  18   08/19/17 16:00


 


BP  114/55   08/19/17 16:00


 


Pulse Ox  95   08/19/17 16:00








 Intake & Output











 08/19/17 08/19/17 08/20/17





 06:59 18:59 06:59


 


Intake Total 1000 298 


 


Output Total 600 350 


 


Balance 400 -52 


 


Weight 59.2 kg  


 


Intake:   


 


    


 


    Sodium Chloride 0.9% 1, 600  





    000 ml @ 75 mls/hr IV .   





    [id number] [facility] Rx#:[id number]   


 


  Intake, IV Titration 200  





  Amount   


 


    Magnesium Sulfate-D5w Pmx 200  





    1 gm In Dextrose/Water 1   





    100ml.bag @ 100 mls/hr   





    IVPB Q1H [facility] Rx#:   





    [id number]   


 


  Oral 200 298 


 


Output:   


 


  Urine 600 350 


 


Other:   


 


  Voiding Method Diaper Urinal 





 Incontinent  


 


  # Voids 2  














- Exam





Physical examination:





PHYSICAL EXAMINATION: Patient is resting comfortably in bed. 


VITAL SIGNS: Blood pressure is [114/55]. Heart rate is [59]. Respiration is [18]

. Temperature is [97.1].


HEENT: Head is atraumatic, neck is supple, there were no carotid bruits.


CHEST: Lungs are clear to auscultation and percussion.  


CARDIAC: S1, S2 normal rate and rhythm. There is no murmur.


ABDOMEN: Soft and nontender. Bowel sounds are present.


EXTREMITIES:  There is no pedal edema.  Peripheral pulses are present.





Neurological examination:





Patient's neurological examination is unchanged from yesterday.  Patient has 

been up and ambulating in his room.  He does have some unsteady gait.





- Labs


CBC & Chem 7: 


 08/19/17 05:39





 08/19/17 18:21


Labs: 


 Abnormal Lab Results - Last 24 Hours (Table)











  08/17/17 08/18/17 08/19/17 Range/Units





  17:58 20:32 05:39 


 


RBC    3.51 L  (4.30-5.90)  m/uL


 


Hgb    10.3 L  (13.0-17.5)  gm/dL


 


Hct    32.4 L  (39.0-53.0)  %


 


Neutrophils #    7.8 H  (1.3-7.7)  k/uL


 


Lymphocytes #    0.8 L  (1.0-4.8)  k/uL


 


Potassium     (3.5-5.1)  mmol/L


 


Chloride     ()  mmol/L


 


Creatinine     (0.66-1.25)  mg/dL


 


POC Glucose (mg/dL)   141 H   (75-99)  mg/dL


 


Calcium     (8.4-10.2)  mg/dL


 


Aldolase  9.1 H    (1.2-7.6)  U/L














  08/19/17 08/19/17 08/19/17 Range/Units





  05:39 11:50 12:57 


 


RBC     (4.30-5.90)  m/uL


 


Hgb     (13.0-17.5)  gm/dL


 


Hct     (39.0-53.0)  %


 


Neutrophils #     (1.3-7.7)  k/uL


 


Lymphocytes #     (1.0-4.8)  k/uL


 


Potassium  3.3 L   3.1 L  (3.5-5.1)  mmol/L


 


Chloride  108 H    ()  mmol/L


 


Creatinine  0.48 L    (0.66-1.25)  mg/dL


 


POC Glucose (mg/dL)   134 H   (75-99)  mg/dL


 


Calcium  8.1 L    (8.4-10.2)  mg/dL


 


Aldolase     (1.2-7.6)  U/L














  08/19/17 Range/Units





  16:39 


 


RBC   (4.30-5.90)  m/uL


 


Hgb   (13.0-17.5)  gm/dL


 


Hct   (39.0-53.0)  %


 


Neutrophils #   (1.3-7.7)  k/uL


 


Lymphocytes #   (1.0-4.8)  k/uL


 


Potassium   (3.5-5.1)  mmol/L


 


Chloride   ()  mmol/L


 


Creatinine   (0.66-1.25)  mg/dL


 


POC Glucose (mg/dL)  203 H  (75-99)  mg/dL


 


Calcium   (8.4-10.2)  mg/dL


 


Aldolase   (1.2-7.6)  U/L














Assessment and Plan


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


Status: Acute   Code(s): I63.219 - CEREB INFRC DUE TO UNSP OCCLS OR STENOSIS OF 

UNSP VERTEB ART; I63.22 - CEREBRAL INFRC DUE TO UNSP OCCLS OR STENOSIS OF 

BASILAR ART   





(2) New onset atrial fibrillation


Status: Acute   Code(s): I48.91 - UNSPECIFIED ATRIAL FIBRILLATION   





(3) Right foot drop


Status: Acute   Code(s): M21.371 - FOOT DROP, RIGHT FOOT   





(4) History of laminectomy


Status: Acute   Code(s): Z98.890 - OTHER SPECIFIED POSTPROCEDURAL STATES   


Plan: 





This patient is a pleasant 84-year-old right-handed white male who was admitted 

to Hospital with symptoms of right-sided weakness and left facial droop.  He 

was brought into the emergency room and was evaluated by the neuro 

interventional group at [facility].  The stroke robot was 

activated and he was evaluated.  Dr. Olivarez reviewed his computed tomography 

scan of the brain and CTA angiogram results and felt he was not a candidate for 

TPA.  He was admitted to hospital for further neurological assessment and 

treatment.  His neurological exam findings at this time revealed right 

hemiparesis with a left facial weakness pattern.  This findings suggest cross 

neurological localization suggesting possibility of brainstem ischemia or 

brainstem stroke.  We have recommended an MRI of the brain for further 

evaluation.  His CTA angiogram was reported negative for any evidence of any 

arterial stenosis.  We will continue with close neurological follow-up for this 

patient during this admission.  He is to be maintained on aspirin therapy for 

secondary stroke prevention at this time pending his stroke workup.  The 

patient underwent MRI of the brain today for further evaluation of possible 

brainstem ischemia.  This MRI is reported negative for any evidence of acute 

stroke.  Patient was seen by cardiology regarding his new onset atrial 

fibrillation.  We will await further recommendations.  We did review the 

results of the MRI today with the patient in detail.  He underwent a routine 

EEG today as well which was reviewed.  EEG is moderately slow.  No evidence of 

any epileptiform discharges were seen.  We have reviewed all of these test 

results today in detail with the patient daughter and she is aware of these 

findings.  We did review his MRI films today with Dr. Rosales and there does 

appear to be possible early ischemia in the left luciano, suggesting infarction. 

We will continue close neurological follow-up of this patient during this 

admission.  Patient did have evidence of elevated C-reactive protein.  

Rheumatology has been consulted. He is being evaluated for possible autoimmune 

disorder.  Patient seems to be doing better today overall.  We will continue to 

follow his progress closely over the weekend.  We will continue close 

neurologic follow-up for this patient.   His overall prognosis at this time 

remains guarded.

## 2017-08-19 NOTE — P.PN
Subjective


Principal diagnosis: 





Right-sided weakness


This is an 84-year-old  gentleman who presented to the hospital with 

symptoms of right-sided weakness and was diagnosed with TIA.  Cardiology was 

following the patient because of a new diagnosis of atrial fibrillation.  At 

this time he continues to be in a normal sinus rhythm, this morning his heart 

rate is in the 40s.  Patient has been given Haldol through the night because of 

agitation.  We will hold his morning dose of beta blocker today, and decrease 

the dose to 12-1/2 mg one tablet by mouth twice a day from tomorrow.  He is on 

Eliquis for anticoagulation.  Echocardiogram revealed an ejection fraction of 55

-60%.








Objective





- Vital Signs


Vital signs: 


 Vital Signs











Temp  97.4 F L  08/19/17 04:00


 


Pulse  55 L  08/19/17 04:00


 


Resp  18   08/19/17 04:00


 


BP  127/56   08/19/17 04:00


 


Pulse Ox  93 L  08/19/17 09:21








 Intake & Output











 08/18/17 08/19/17 08/19/17





 18:59 06:59 18:59


 


Intake Total 100 1000 


 


Output Total 300 600 0


 


Balance -200 400 0


 


Weight  59.2 kg 


 


Intake:   


 


  IV  600 


 


    Sodium Chloride 0.9% 1,  600 





    000 ml @ 75 mls/hr IV .   





    [id number] [facility] Rx#:[id number]   


 


  Intake, IV Titration  200 





  Amount   


 


    Magnesium Sulfate-D5w Pmx  200 





    1 gm In Dextrose/Water 1   





    100ml.bag @ 100 mls/hr   





    IVPB Q1H [facility] Rx#:   





    [id number]   


 


  Oral 100 200 


 


Output:   


 


  Urine 300 600 0


 


Other:   


 


  Voiding Method Diaper Diaper 





 Incontinent Incontinent 


 


  # Voids 1 2 














- Exam





HEENT: Head is atraumatic, normocephalic.  Pupils equal, round.  Neck is 

supple.  There is no elevated jugular venous pressure.





HEART EXAMINATION: Heart S1 and S2 with soft systolic murmur is heard.





CHEST EXAMINATION: Lungs are clear to auscultation and precussion. No chest 

wall tenderness is noted on palpation or with deep breathing.





ABDOMEN:  Soft, nontender. Bowel sounds are heard. No organomegaly noted.


 


EXTREMITIES:[ 2+ peripheral pulses with no evidence of peripheral edema and no 

calf tenderness noted].





NEUROLOGIC [patient is awake, alert and oriented times one








- Labs


CBC & Chem 7: 


 08/19/17 05:39





 08/19/17 05:39


Labs: 


 Abnormal Lab Results - Last 24 Hours (Table)











  08/17/17 08/17/17 08/18/17 Range/Units





  17:58 18:09 12:02 


 


RBC     (4.30-5.90)  m/uL


 


Hgb     (13.0-17.5)  gm/dL


 


Hct     (39.0-53.0)  %


 


Neutrophils #     (1.3-7.7)  k/uL


 


Lymphocytes #     (1.0-4.8)  k/uL


 


Potassium     (3.5-5.1)  mmol/L


 


Chloride     ()  mmol/L


 


Creatinine     (0.66-1.25)  mg/dL


 


POC Glucose (mg/dL)    147 H  (75-99)  mg/dL


 


Calcium     (8.4-10.2)  mg/dL


 


Aldolase  9.1 H    (1.2-7.6)  U/L


 


Free Kappa LC, Quant   4.13 H   (0.33-1.94)  mg/dL


 


Free Lambda LC, Quant   3.20 H   (0.57-2.63)  mg/dL














  08/18/17 08/18/17 08/19/17 Range/Units





  17:07 20:32 05:39 


 


RBC    3.51 L  (4.30-5.90)  m/uL


 


Hgb    10.3 L  (13.0-17.5)  gm/dL


 


Hct    32.4 L  (39.0-53.0)  %


 


Neutrophils #    7.8 H  (1.3-7.7)  k/uL


 


Lymphocytes #    0.8 L  (1.0-4.8)  k/uL


 


Potassium     (3.5-5.1)  mmol/L


 


Chloride     ()  mmol/L


 


Creatinine     (0.66-1.25)  mg/dL


 


POC Glucose (mg/dL)  148 H  141 H   (75-99)  mg/dL


 


Calcium     (8.4-10.2)  mg/dL


 


Aldolase     (1.2-7.6)  U/L


 


Free Kappa LC, Quant     (0.33-1.94)  mg/dL


 


Free Lambda LC, Quant     (0.57-2.63)  mg/dL














  08/19/17 Range/Units





  05:39 


 


RBC   (4.30-5.90)  m/uL


 


Hgb   (13.0-17.5)  gm/dL


 


Hct   (39.0-53.0)  %


 


Neutrophils #   (1.3-7.7)  k/uL


 


Lymphocytes #   (1.0-4.8)  k/uL


 


Potassium  3.3 L  (3.5-5.1)  mmol/L


 


Chloride  108 H  ()  mmol/L


 


Creatinine  0.48 L  (0.66-1.25)  mg/dL


 


POC Glucose (mg/dL)   (75-99)  mg/dL


 


Calcium  8.1 L  (8.4-10.2)  mg/dL


 


Aldolase   (1.2-7.6)  U/L


 


Free Kappa LC, Quant   (0.33-1.94)  mg/dL


 


Free Lambda LC, Quant   (0.57-2.63)  mg/dL








 Microbiology - Last 24 Hours (Table)











 08/16/17 22:35 Urine Culture - Final





 Urine,Voided 














Assessment and Plan


Plan: 


Assessment and plan


#1 symptoms of right-sided arm and leg weakness, suggestive of TIA.  Neurology 

following.


#2 atrial fibrillation with rapid ventricular response, now in normal sinus 

rhythm.  New-onset.  Paroxysmal in nature.


#3 Mild dementia


#4 recurrent UTIs


#5 asthma








Plan





Heart rate this morning in the 40s, patient is noted to also be receiving 

Haldol through the night for agitation.  We will hold his beta blocker today 

and decrease the dose from tomorrow to 12-1/2 mg one tablet by mouth twice a 

day.  Continue current dose of Eliquis.





DNP note has been reviewed, I agree with a documented findings and plan of 

care.  Patient was seen and examined.

## 2017-08-20 LAB
ANION GAP SERPL CALC-SCNC: 7 MMOL/L
BASOPHILS # BLD AUTO: 0 K/UL (ref 0–0.2)
BASOPHILS NFR BLD AUTO: 0 %
BUN SERPL-SCNC: 16 MG/DL (ref 9–20)
CALCIUM SPEC-MCNC: 8.5 MG/DL (ref 8.4–10.2)
CH: 31
CHCM: 33.5
CHLORIDE SERPL-SCNC: 109 MMOL/L (ref 98–107)
CO2 SERPL-SCNC: 25 MMOL/L (ref 22–30)
EOSINOPHIL # BLD AUTO: 0 K/UL (ref 0–0.7)
EOSINOPHIL NFR BLD AUTO: 0 %
ERYTHROCYTE [DISTWIDTH] IN BLOOD BY AUTOMATED COUNT: 3.26 M/UL (ref 4.3–5.9)
ERYTHROCYTE [DISTWIDTH] IN BLOOD: 14.1 % (ref 11.5–15.5)
GLUCOSE BLD-MCNC: 112 MG/DL (ref 75–99)
GLUCOSE BLD-MCNC: 118 MG/DL (ref 75–99)
GLUCOSE BLD-MCNC: 150 MG/DL (ref 75–99)
GLUCOSE BLD-MCNC: 189 MG/DL (ref 75–99)
GLUCOSE SERPL-MCNC: 113 MG/DL (ref 74–99)
HCT VFR BLD AUTO: 30.4 % (ref 39–53)
HDW: 2.84
HGB BLD-MCNC: 9.7 GM/DL (ref 13–17.5)
LUC NFR BLD AUTO: 1 %
LYMPHOCYTES # SPEC AUTO: 0.8 K/UL (ref 1–4.8)
LYMPHOCYTES NFR SPEC AUTO: 9 %
MCH RBC QN AUTO: 29.7 PG (ref 25–35)
MCHC RBC AUTO-ENTMCNC: 32 G/DL (ref 31–37)
MCV RBC AUTO: 93 FL (ref 80–100)
MONOCYTES # BLD AUTO: 0.6 K/UL (ref 0–1)
MONOCYTES NFR BLD AUTO: 7 %
NEUTROPHILS # BLD AUTO: 7.4 K/UL (ref 1.3–7.7)
NEUTROPHILS NFR BLD AUTO: 84 %
NON-AFRICAN AMERICAN GFR(MDRD): >60
POTASSIUM SERPL-SCNC: 3.8 MMOL/L (ref 3.5–5.1)
SODIUM SERPL-SCNC: 141 MMOL/L (ref 137–145)
WBC # BLD AUTO: 0.1 10*3/UL
WBC # BLD AUTO: 8.9 K/UL (ref 3.8–10.6)
WBC (PEROX): 9.55

## 2017-08-20 RX ADMIN — CEFAZOLIN SCH: 330 INJECTION, POWDER, FOR SOLUTION INTRAMUSCULAR; INTRAVENOUS at 06:06

## 2017-08-20 RX ADMIN — PANTOPRAZOLE SODIUM SCH MG: 40 TABLET, DELAYED RELEASE ORAL at 06:27

## 2017-08-20 RX ADMIN — CEFAZOLIN SCH: 330 INJECTION, POWDER, FOR SOLUTION INTRAMUSCULAR; INTRAVENOUS at 20:36

## 2017-08-20 RX ADMIN — BUDESONIDE AND FORMOTEROL FUMARATE DIHYDRATE SCH PUFF: 80; 4.5 AEROSOL RESPIRATORY (INHALATION) at 20:47

## 2017-08-20 RX ADMIN — MEGESTROL ACETATE SCH MG: 40 SUSPENSION ORAL at 07:34

## 2017-08-20 RX ADMIN — METOPROLOL TARTRATE SCH MG: 25 TABLET, FILM COATED ORAL at 07:38

## 2017-08-20 RX ADMIN — METOPROLOL TARTRATE SCH MG: 25 TABLET, FILM COATED ORAL at 20:28

## 2017-08-20 RX ADMIN — APIXABAN SCH MG: 2.5 TABLET, FILM COATED ORAL at 20:28

## 2017-08-20 RX ADMIN — BUDESONIDE AND FORMOTEROL FUMARATE DIHYDRATE SCH PUFF: 80; 4.5 AEROSOL RESPIRATORY (INHALATION) at 08:26

## 2017-08-20 RX ADMIN — APIXABAN SCH MG: 2.5 TABLET, FILM COATED ORAL at 07:34

## 2017-08-20 RX ADMIN — MIRTAZAPINE SCH MG: 15 TABLET, FILM COATED ORAL at 20:28

## 2017-08-20 RX ADMIN — FOLIC ACID SCH MG: 1 TABLET ORAL at 07:34

## 2017-08-21 LAB
GLUCOSE BLD-MCNC: 106 MG/DL (ref 75–99)
GLUCOSE BLD-MCNC: 130 MG/DL (ref 75–99)
GLUCOSE BLD-MCNC: 183 MG/DL (ref 75–99)

## 2017-08-21 RX ADMIN — FOLIC ACID SCH MG: 1 TABLET ORAL at 07:55

## 2017-08-21 RX ADMIN — BUDESONIDE AND FORMOTEROL FUMARATE DIHYDRATE SCH PUFF: 80; 4.5 AEROSOL RESPIRATORY (INHALATION) at 08:10

## 2017-08-21 RX ADMIN — BUDESONIDE AND FORMOTEROL FUMARATE DIHYDRATE SCH PUFF: 80; 4.5 AEROSOL RESPIRATORY (INHALATION) at 20:14

## 2017-08-21 RX ADMIN — METOPROLOL TARTRATE SCH MG: 25 TABLET, FILM COATED ORAL at 20:08

## 2017-08-21 RX ADMIN — METOPROLOL TARTRATE SCH MG: 25 TABLET, FILM COATED ORAL at 07:55

## 2017-08-21 RX ADMIN — MEGESTROL ACETATE SCH MG: 40 SUSPENSION ORAL at 07:55

## 2017-08-21 RX ADMIN — APIXABAN SCH MG: 2.5 TABLET, FILM COATED ORAL at 07:55

## 2017-08-21 RX ADMIN — CEFAZOLIN SCH: 330 INJECTION, POWDER, FOR SOLUTION INTRAMUSCULAR; INTRAVENOUS at 16:00

## 2017-08-21 RX ADMIN — PANTOPRAZOLE SODIUM SCH MG: 40 TABLET, DELAYED RELEASE ORAL at 06:28

## 2017-08-21 RX ADMIN — APIXABAN SCH MG: 2.5 TABLET, FILM COATED ORAL at 20:07

## 2017-08-21 RX ADMIN — MIRTAZAPINE SCH MG: 15 TABLET, FILM COATED ORAL at 20:08

## 2017-08-21 NOTE — P.PN
Subjective


Principal diagnosis: 





Right-sided weakness


This is an 84-year-old  gentleman who presented to the hospital with 

symptoms of right-sided weakness and was diagnosed with TIA.  Cardiology was 

following the patient because of a new diagnosis of atrial fibrillation.  At 

this time he continues to be in a normal sinus rhythm, this morning his heart 

rate is in the 40s.  Patient has been given Haldol through the night because of 

agitation.  We will hold his morning dose of beta blocker today, and decrease 

the dose to 12-1/2 mg one tablet by mouth twice a day from tomorrow.  He is on 

Eliquis for anticoagulation.  Echocardiogram revealed an ejection fraction of 55

-60%.








08/21/2017


Patient seen and examined this morning, sitting up in the chair bedside.  

Continues to be confused.  In normal sinus rhythm with heart rates in the 70s.  

On Eliquis for anticoagulation.





Objective





- Vital Signs


Vital signs: 


 Vital Signs











Temp  96.7 F L  08/21/17 07:53


 


Pulse  52 L  08/21/17 07:53


 


Resp  20   08/21/17 07:53


 


BP  161/73   08/21/17 07:53


 


Pulse Ox  98   08/21/17 07:53








 Intake & Output











 08/20/17 08/21/17 08/21/17





 18:59 06:59 18:59


 


Intake Total 780 600 236


 


Output Total  600 250


 


Balance 780 0 -14


 


Weight  62.5 kg 


 


Intake:   


 


   600 


 


    Sodium Chloride 0.9% 1, 600 600 





    000 ml @ 75 mls/hr IV .   





    [id number] [facility] Rx#:[id number]   


 


  Oral 180  236


 


Output:   


 


  Urine  600 250


 


Other:   


 


  Voiding Method Bedside Commode Bedside Commode Toilet





 Diaper Diaper 





 Incontinent Incontinent 


 


  # Voids 1 2 














- Exam





HEENT: Head is atraumatic, normocephalic.  Pupils equal, round.  Neck is 

supple.  There is no elevated jugular venous pressure.





HEART EXAMINATION: Heart S1 and S2 with soft systolic murmur is heard.





CHEST EXAMINATION: Lungs are clear to auscultation and precussion. No chest 

wall tenderness is noted on palpation or with deep breathing.





ABDOMEN:  Soft, nontender. Bowel sounds are heard. No organomegaly noted.


 


EXTREMITIES:[ 2+ peripheral pulses with no evidence of peripheral edema and no 

calf tenderness noted].





NEUROLOGIC [patient is awake, alert and oriented times one








- Labs


CBC & Chem 7: 


 08/20/17 06:05





 08/20/17 06:05


Labs: 


 Abnormal Lab Results - Last 24 Hours (Table)











  08/20/17 08/20/17 08/21/17 Range/Units





  16:35 20:35 06:02 


 


POC Glucose (mg/dL)  112 H  150 H  106 H  (75-99)  mg/dL














  08/21/17 Range/Units





  11:29 


 


POC Glucose (mg/dL)  130 H  (75-99)  mg/dL














Assessment and Plan


Plan: 


Assessment and plan


#1 symptoms of right-sided arm and leg weakness, suggestive of TIA.  Neurology 

following.


#2 atrial fibrillation with rapid ventricular response, now in normal sinus 

rhythm.  New-onset.  Paroxysmal in nature.


#3 Mild dementia


#4 recurrent UTIs


#5 asthma








Plan





From cardiology's perspective, we will continue the patient on his current 

medications.We will follow him now on an as-needed basis only, please don't 

hesitate to call with any questions.


DNP note has been reviewed, I agree with a documented findings and plan of 

care.  Patient was seen and examined.

## 2017-08-21 NOTE — P.PN
Subjective


Principal diagnosis: 





Acute CVA with right-sided arm weakness with left facial droop.


#2 atrial fibrillation with rapid ventricular response, now in normal sinus 

rhythm.  New-onset.  Paroxysmal in nature.


#3 Mild dementia


#4 recurrent UTIs


#5 asthma








This is an 84-year-old  gentleman with a known history of asthma, 

rheumatoid arthritis, history of prostate cancer status post radiation seeds 

who presented to the hospital with symptoms of right-sided weakness and was 

diagnosed with CVA/TIA.  Patient was found to have new diagnosis of atrial 

fibrillation.  At this time he continues to be in a normal sinus rhythm. He is 

on Eliquis for anticoagulation.  


Echocardiogram revealed an ejection fraction of 55-60%.


Cardiology and neurology is following this patient





Patient had workup done including CT head, CT angiogram, EEG, carotid duplex 

and MRI of the brain.


MRI of the brain does appear to be possible early ischemia in the left luciano, 

suggesting infarction.  He is admitted atrophic changes and high right frontal 

meningioma





Today patient is sitting in a chair comfortably.  Denied any chest pain or 

short of breath.  Patient is bradycardic and has not started beta blockers at 

this time.  No fever no chills.  Right upper extremity strength slightly 

improved.  No Other acute overnight issues.





Objective





- Vital Signs


Vital signs: 


 Vital Signs











Temp  97.5 F L  08/20/17 20:00


 


Pulse  66   08/20/17 20:00


 


Resp  18   08/20/17 20:00


 


BP  120/57   08/20/17 20:00


 


Pulse Ox  98   08/20/17 20:51








 Intake & Output











 08/20/17 08/20/17 08/21/17





 06:59 18:59 06:59


 


Intake Total 1440 780 


 


Output Total 400  


 


Balance 1040 780 


 


Weight 59.7 kg  


 


Intake:   


 


  IV 1200 600 


 


    Sodium Chloride 0.9% 1, 1200 600 





    000 ml @ 75 mls/hr IV .   





    [id number] [facility] Rx#:[id number]   


 


  Oral 240 180 


 


Output:   


 


  Urine 400  


 


Other:   


 


  Voiding Method Urinal Bedside Commode Bedside Commode





 Diaper Diaper Diaper





  Incontinent Incontinent


 


  # Voids 2 1 














- Exam





PHYSICAL EXAMINATION:


 Patient is lying in the bed comfortably, no acute distress, awake alert and 

oriented.. 


HEENT: Normocephalic. Neck is supple. Pupils reactive. Nostrils clear. Oral 

cavity is moist. Ears reveal no drainage. 


Neck reveals no JVD, carotid bruits, or thyromegaly. 


CHEST EXAMINATION: Trachea is central. Symmetrical expansion. Lung fields clear 

to auscultation and percussion. 


CARDIAC: Normal S1, S2 with no gallops. No murmurs . bradycardia.


 ABDOMEN: Soft. Bowel sounds normal. No organomegaly. No abdominal bruits. 


Extremities reveal no edema.  No clubbing or cyanosis 


Neurologically awake, alert, oriented 2-3.  Right lower extremity foot drop.  

And right upper extremity muscle strength 3-4 out of 5


Skin: no rash or skin lesions


Musculoskeletal: no joint swelling or deformity.  Mild rheumatoid arthritis 

changes








- Labs


CBC & Chem 7: 


 08/20/17 06:05





 08/20/17 06:05


Labs: 


 Abnormal Lab Results - Last 24 Hours (Table)











  08/20/17 08/20/17 08/20/17 Range/Units





  06:02 06:05 06:05 


 


RBC   3.26 L   (4.30-5.90)  m/uL


 


Hgb   9.7 L   (13.0-17.5)  gm/dL


 


Hct   30.4 L   (39.0-53.0)  %


 


Lymphocytes #   0.8 L   (1.0-4.8)  k/uL


 


Chloride    109 H  ()  mmol/L


 


Creatinine    0.50 L  (0.66-1.25)  mg/dL


 


Glucose    113 H  (74-99)  mg/dL


 


POC Glucose (mg/dL)  118 H    (75-99)  mg/dL














  08/20/17 08/20/17 08/20/17 Range/Units





  11:33 16:35 20:35 


 


RBC     (4.30-5.90)  m/uL


 


Hgb     (13.0-17.5)  gm/dL


 


Hct     (39.0-53.0)  %


 


Lymphocytes #     (1.0-4.8)  k/uL


 


Chloride     ()  mmol/L


 


Creatinine     (0.66-1.25)  mg/dL


 


Glucose     (74-99)  mg/dL


 


POC Glucose (mg/dL)  189 H  112 H  150 H  (75-99)  mg/dL














Assessment and Plan


Plan: 





#1 acute CVA with right arm weakness and left facial droop.  Improving.  


     MRI showed possible early ischemia in the left luciano, suggesting infarction


#2 new onset atrial fibrillation.  Currently in sinus rhythm.  Started on low-

dose metoprolol


#3 right wrist synovitis and elevated CRP level.  Patient was started on 

redness on 30 mg daily.


#4 Rheumatoid arthritis.  CCP positive and rheumatoid factor 18. Not on 

immunosuppressive therapy


#5 history of right foot drop


#5 unintensional  intestinal weight loss.  Possible poor oral intake.  

Malignancy workup so far negative.


#6 mild dementia


#7 recurrent UTI


#8 history of asthma stable


#9 depression


#10 mild protein calorie malnutrition with a low level 3.4


#11 normocytic anemia.








Plan:


Patient will be continued on telemetry monitoring and patient was started on 

metoprolol 12.5 mg twice a day and anticoagulation.  Continue the prednisone 

now.


Continue with atorvastatin for secondary stroke prophylaxis.  Patient is 

already on antibiotic ideation with eliquis.


We will continue to follow closely.  Continue PT OT and possible transfer to 

rehab.


Time with Patient: Greater than 30

## 2017-08-21 NOTE — P.PN
Subjective


Principal diagnosis: 





Acute CVA with right-sided arm weakness with left facial droop.


#2 atrial fibrillation with rapid ventricular response, now in normal sinus 

rhythm.  New-onset.  Paroxysmal in nature.


#3 Mild dementia


#4 recurrent UTIs


#5 asthma








This is an 84-year-old  gentleman with a known history of asthma, 

rheumatoid arthritis, history of prostate cancer status post radiation seeds 

who presented to the hospital with symptoms of right-sided weakness and was 

diagnosed with CVA/TIA.  Patient was found to have new diagnosis of atrial 

fibrillation.  At this time he continues to be in a normal sinus rhythm. He is 

on Eliquis for anticoagulation.  


Echocardiogram revealed an ejection fraction of 55-60%.


Cardiology and neurology is following this patient





Patient had workup done including CT head, CT angiogram, EEG, carotid duplex 

and MRI of the brain.


MRI of the brain does appear to be possible early ischemia in the left luciano, 

suggesting infarction.  He is admitted atrophic changes and high right frontal 

meningioma





Today patient is sitting in a chair comfortably.  Denied any chest pain or 

short of breath.  Patient is bradycardic and has not started beta blockers at 

this time.  No fever no chills.  Right upper extremity strength slightly 

improved.  No Other acute overnight issues.





08/21/2017


Patient denied any new complaints.  Continue the current management and 

anticipate transfer to rehab tomorrow.





Objective





- Vital Signs


Vital signs: 


 Vital Signs











Temp  97.0 F L  08/21/17 20:00


 


Pulse  57 L  08/21/17 20:00


 


Resp  18   08/21/17 20:00


 


BP  161/68   08/21/17 20:00


 


Pulse Ox  99   08/21/17 20:00








 Intake & Output











 08/21/17 08/21/17 08/22/17





 06:59 18:59 06:59


 


Intake Total 600 672 250


 


Output Total 600 250 450


 


Balance 0 422 -200


 


Weight 62.5 kg 62.5 kg 


 


Intake:   


 


    


 


    Sodium Chloride 0.9% 1, 600  





    000 ml @ 75 mls/hr IV .   





    [id number] [facility] Rx#:[id number]   


 


  Intake, IV Titration   250





  Amount   


 


    Sodium Chloride 0.9% 1,   250





    000 ml @ 75 mls/hr IV .   





    [id number] [facility] Rx#:[id number]   


 


  Oral  672 


 


Output:   


 


  Urine 600 250 450


 


Other:   


 


  Voiding Method Bedside Commode Toilet Toilet





 Diaper Urinal Urinal





 Incontinent  


 


  # Voids 2  














- Exam





PHYSICAL EXAMINATION:


 Patient is lying in the bed comfortably, no acute distress, awake alert and 

oriented.. 


HEENT: Normocephalic. Neck is supple. Pupils reactive. Nostrils clear. Oral 

cavity is moist. Ears reveal no drainage. 


Neck reveals no JVD, carotid bruits, or thyromegaly. 


CHEST EXAMINATION: Trachea is central. Symmetrical expansion. Lung fields clear 

to auscultation and percussion. 


CARDIAC: Normal S1, S2 with no gallops. No murmurs . bradycardia.


 ABDOMEN: Soft. Bowel sounds normal. No organomegaly. No abdominal bruits. 


Extremities reveal no edema.  No clubbing or cyanosis 


Neurologically awake, alert, oriented 2-3.  Right lower extremity foot drop.  

And right upper extremity muscle strength 3-4 out of 5


Skin: no rash or skin lesions


Musculoskeletal: no joint swelling or deformity.  Mild rheumatoid arthritis 

changes








- Labs


CBC & Chem 7: 


 08/20/17 06:05





 08/20/17 06:05


Labs: 


 Abnormal Lab Results - Last 24 Hours (Table)











  08/17/17 08/17/17 08/21/17 Range/Units





  17:58 18:09 06:02 


 


Lupus Anticoag aPTT  68 H    (<43)  Sec(s)


 


Lupus Anticoag PTT Mix  57 H    (<43)  Sec(s)


 


Dil Kian Viper Venom  79 H    (<44)  Sec(s)


 


dRVVT 50:50  53 H    (<44)  Sec(s)


 


Lupus Hexagonal Phase  Positive H    (Negative)  


 


POC Glucose (mg/dL)    106 H  (75-99)  mg/dL


 


Total Protein (PEP)   5.6 L   (6.2-8.2)  g/dL


 


Albumin (PEP)   2.59 L   (3.80-4.90)  g/dL














  08/21/17 08/21/17 Range/Units





  11:29 16:39 


 


Lupus Anticoag aPTT    (<43)  Sec(s)


 


Lupus Anticoag PTT Mix    (<43)  Sec(s)


 


Dil Kian Viper Venom    (<44)  Sec(s)


 


dRVVT 50:50    (<44)  Sec(s)


 


Lupus Hexagonal Phase    (Negative)  


 


POC Glucose (mg/dL)  130 H  183 H  (75-99)  mg/dL


 


Total Protein (PEP)    (6.2-8.2)  g/dL


 


Albumin (PEP)    (3.80-4.90)  g/dL














Assessment and Plan


Plan: 





#1 acute CVA with right arm weakness and left facial droop.  Improving.  


     MRI showed possible early ischemia in the left luciano, suggesting infarction


#2 new onset atrial fibrillation.  Currently in sinus rhythm.  Started on low-

dose metoprolol


#3 right wrist synovitis and elevated CRP level.  Patient was started on 

redness on 30 mg daily.


#4 Rheumatoid arthritis.  CCP positive and rheumatoid factor 18. Not on 

immunosuppressive therapy


#5 history of right foot drop


#5 unintensional  intestinal weight loss.  Possible poor oral intake.  

Malignancy workup so far negative.


#6 mild dementia


#7 recurrent UTI


#8 history of asthma stable


#9 depression


#10 mild protein calorie malnutrition with a low level 3.4


#11 normocytic anemia.








Plan:


Patient will be continued on telemetry monitoring and patient was started on 

metoprolol 12.5 mg twice a day and anticoagulation.  Continue the prednisone 

now.


Continue with atorvastatin for secondary stroke prophylaxis.  Patient is 

already on antibiotic ideation with eliquis.


We will continue to follow closely.  Continue PT OT and possible transfer to 

rehab.

## 2017-08-22 VITALS
HEART RATE: 64 BPM | RESPIRATION RATE: 16 BRPM | SYSTOLIC BLOOD PRESSURE: 139 MMHG | TEMPERATURE: 97.6 F | DIASTOLIC BLOOD PRESSURE: 66 MMHG

## 2017-08-22 LAB
ANION GAP SERPL CALC-SCNC: 6 MMOL/L
BASOPHILS # BLD AUTO: 0 K/UL (ref 0–0.2)
BASOPHILS NFR BLD AUTO: 0 %
BUN SERPL-SCNC: 16 MG/DL (ref 9–20)
CALCIUM SPEC-MCNC: 8.6 MG/DL (ref 8.4–10.2)
CH: 31.1
CHCM: 33.5
CHLORIDE SERPL-SCNC: 107 MMOL/L (ref 98–107)
CO2 SERPL-SCNC: 28 MMOL/L (ref 22–30)
EOSINOPHIL # BLD AUTO: 0 K/UL (ref 0–0.7)
EOSINOPHIL NFR BLD AUTO: 0 %
ERYTHROCYTE [DISTWIDTH] IN BLOOD BY AUTOMATED COUNT: 3.58 M/UL (ref 4.3–5.9)
ERYTHROCYTE [DISTWIDTH] IN BLOOD: 14.1 % (ref 11.5–15.5)
GLUCOSE SERPL-MCNC: 110 MG/DL (ref 74–99)
HCT VFR BLD AUTO: 33.4 % (ref 39–53)
HDW: 2.72
HGB BLD-MCNC: 10.7 GM/DL (ref 13–17.5)
IRON SERPL-MCNC: 41 UG/DL (ref 49–181)
LUC NFR BLD AUTO: 1 %
LYMPHOCYTES # SPEC AUTO: 1.1 K/UL (ref 1–4.8)
LYMPHOCYTES NFR SPEC AUTO: 10 %
MAGNESIUM SPEC-SCNC: 1.7 MG/DL (ref 1.6–2.3)
MCH RBC QN AUTO: 29.8 PG (ref 25–35)
MCHC RBC AUTO-ENTMCNC: 31.9 G/DL (ref 31–37)
MCV RBC AUTO: 93.3 FL (ref 80–100)
MONOCYTES # BLD AUTO: 0.8 K/UL (ref 0–1)
MONOCYTES NFR BLD AUTO: 7 %
NEUTROPHILS # BLD AUTO: 9 K/UL (ref 1.3–7.7)
NEUTROPHILS NFR BLD AUTO: 82 %
NON-AFRICAN AMERICAN GFR(MDRD): >60
POTASSIUM SERPL-SCNC: 3.7 MMOL/L (ref 3.5–5.1)
SODIUM SERPL-SCNC: 141 MMOL/L (ref 137–145)
TIBC SERPL-MCNC: 184 UG/DL (ref 261–462)
VIT B12 SERPL-MCNC: 330 PG/ML
WBC # BLD AUTO: 0.14 10*3/UL
WBC # BLD AUTO: 11.1 K/UL (ref 3.8–10.6)
WBC (PEROX): 12.2

## 2017-08-22 RX ADMIN — APIXABAN SCH MG: 2.5 TABLET, FILM COATED ORAL at 08:24

## 2017-08-22 RX ADMIN — MEGESTROL ACETATE SCH MG: 40 SUSPENSION ORAL at 08:25

## 2017-08-22 RX ADMIN — METOPROLOL TARTRATE SCH MG: 25 TABLET, FILM COATED ORAL at 08:24

## 2017-08-22 RX ADMIN — BUDESONIDE AND FORMOTEROL FUMARATE DIHYDRATE SCH: 80; 4.5 AEROSOL RESPIRATORY (INHALATION) at 11:00

## 2017-08-22 RX ADMIN — FOLIC ACID SCH MG: 1 TABLET ORAL at 08:24

## 2017-08-22 RX ADMIN — PANTOPRAZOLE SODIUM SCH MG: 40 TABLET, DELAYED RELEASE ORAL at 06:12

## 2017-08-22 NOTE — P.PN
Subjective


Principal diagnosis: 





Right-sided weakness


This is an 84-year-old  gentleman who presented to the hospital with 

symptoms of right-sided weakness and was diagnosed with TIA.  Cardiology was 

following the patient because of a new diagnosis of atrial fibrillation.  At 

this time he continues to be in a normal sinus rhythm, this morning his heart 

rate is in the 40s.  Patient has been given Haldol through the night because of 

agitation.  We will hold his morning dose of beta blocker today, and decrease 

the dose to 12-1/2 mg one tablet by mouth twice a day from tomorrow.  He is on 

Eliquis for anticoagulation.  Echocardiogram revealed an ejection fraction of 55

-60%.








08/21/2017


Patient seen and examined this morning, sitting up in the chair bedside.  

Continues to be confused.  In normal sinus rhythm with heart rates in the 70s.  

On Eliquis for anticoagulation.





08/22/2017


Patient seen and examined this morning, being discharged home today.  Currently 

on Eliquis for anticoagulation.





Objective





- Vital Signs


Vital signs: 


 Vital Signs











Temp  97.6 F   08/22/17 08:00


 


Pulse  64   08/22/17 08:00


 


Resp  16   08/22/17 08:00


 


BP  139/66   08/22/17 08:00


 


Pulse Ox  95   08/22/17 08:00








 Intake & Output











 08/21/17 08/22/17 08/22/17





 18:59 06:59 18:59


 


Intake Total 672 250 


 


Output Total 250 450 


 


Balance 422 -200 


 


Weight 62.5 kg 61.6 kg 


 


Intake:   


 


  Intake, IV Titration  250 





  Amount   


 


    Sodium Chloride 0.9% 1,  250 





    000 ml @ 75 mls/hr IV .   





    [id number] [facility] Rx#:[id number]   


 


  Oral 672  


 


Output:   


 


  Urine 250 450 


 


Other:   


 


  Voiding Method Toilet Toilet Toilet





 Urinal Urinal Urinal


 


  # Voids  1 














- Exam





HEENT: Head is atraumatic, normocephalic.  Pupils equal, round.  Neck is 

supple.  There is no elevated jugular venous pressure.





HEART EXAMINATION: Heart S1 and S2 with soft systolic murmur is heard.





CHEST EXAMINATION: Lungs are clear to auscultation and precussion. No chest 

wall tenderness is noted on palpation or with deep breathing.





ABDOMEN:  Soft, nontender. Bowel sounds are heard. No organomegaly noted.


 


EXTREMITIES:[ 2+ peripheral pulses with no evidence of peripheral edema and no 

calf tenderness noted].





NEUROLOGIC [patient is awake, alert and oriented times one








- Labs


CBC & Chem 7: 


 08/22/17 06:20





 08/22/17 06:20


Labs: 


 Abnormal Lab Results - Last 24 Hours (Table)











  08/17/17 08/21/17 08/22/17 Range/Units





  18:09 16:39 06:20 


 


WBC     (3.8-10.6)  k/uL


 


RBC     (4.30-5.90)  m/uL


 


Hgb     (13.0-17.5)  gm/dL


 


Hct     (39.0-53.0)  %


 


Neutrophils #     (1.3-7.7)  k/uL


 


Creatinine     (0.66-1.25)  mg/dL


 


Glucose     (74-99)  mg/dL


 


POC Glucose (mg/dL)   183 H   (75-99)  mg/dL


 


Iron    41 L  ()  ug/dL


 


TIBC    184 L  (261-462)  ug/dL


 


Total Protein (PEP)  5.6 L    (6.2-8.2)  g/dL


 


Albumin (PEP)  2.59 L    (3.80-4.90)  g/dL














  08/22/17 08/22/17 Range/Units





  06:20 06:20 


 


WBC  11.1 H   (3.8-10.6)  k/uL


 


RBC  3.58 L   (4.30-5.90)  m/uL


 


Hgb  10.7 L   (13.0-17.5)  gm/dL


 


Hct  33.4 L   (39.0-53.0)  %


 


Neutrophils #  9.0 H   (1.3-7.7)  k/uL


 


Creatinine   0.62 L  (0.66-1.25)  mg/dL


 


Glucose   110 H  (74-99)  mg/dL


 


POC Glucose (mg/dL)    (75-99)  mg/dL


 


Iron    ()  ug/dL


 


TIBC    (261-462)  ug/dL


 


Total Protein (PEP)    (6.2-8.2)  g/dL


 


Albumin (PEP)    (3.80-4.90)  g/dL














Assessment and Plan


Plan: 


Assessment and plan


#1 symptoms of right-sided arm and leg weakness, suggestive of TIA.  Neurology 

following.


#2 atrial fibrillation with rapid ventricular response, now in normal sinus 

rhythm.  New-onset.  Paroxysmal in nature.


#3 Mild dementia


#4 recurrent UTIs


#5 asthma








Plan





From cardiology's perspective, we will continue the patient on his current 

medications.  Patient will require lifelong anticoagulation for stroke 

prevention.  We will make him a follow-up appointment in the office to see Dr. Thurston post discharge.


DNP note has been reviewed, I agree with a documented findings and plan of 

care.  Patient was seen and examined.

## 2019-08-07 NOTE — NM
EXAMINATION TYPE: NM bone scan whole body

 

DATE OF EXAM: 8/6/2019

 

COMPARISON: No recent exams, correlation to prior CT 6/4/2017

 

HISTORY: Prostate cancer, right hip pain

 

Delayed whole-body scanning was performed following the injection of 20.2 mCi Tc 99m MDP.  Images acq
uired 3 hours post injection.

 

FINDINGS: 

 

Mild increased uptake noted at the right hip joint correlates with osteoarthritic change. Arthropathy
 also noted in the right knee greater than left, bilateral wrists, shoulders, sternoclavicular joints
. Uptake within the lumbar spine correlates with patient's degenerative disc change. Uptake in the ma
xilla and mandible likely due to periodontal disease. Soft tissue uptake is noted and is normal.

 

IMPRESSION: 

 

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 Degenerative disc disease
 in the lumbar spine. Metastatic disease is not evident.

## 2020-08-09 NOTE — XR
EXAMINATION TYPE: XR chest 2V

 

DATE OF EXAM: 8/9/2020

 

CLINICAL HISTORY: Cough, fever 

 

TECHNIQUE:  Frontal and lateral views of the chest are obtained.

 

COMPARISON: 8/16/2017 chest radiograph

 

FINDINGS:  The cardiomediastinal silhouette is within normal limits for size. Pulmonary vasculature i
s normal. There is no focal air space opacity, pleural effusion, or pneumothorax seen. Biapical pleur
al thickening. The osseous structures are intact.

 

IMPRESSION:  No acute cardiopulmonary process.

## 2020-08-09 NOTE — XR
EXAMINATION TYPE: XR Hip Bilateral and AP pelvis

 

DATE OF EXAM: 8/9/2020

 

COMPARISON: CT abdomen and pelvis 6/4/2017.

 

HISTORY: Pain after fall

 

TECHNIQUE: A single AP view of the pelvis is obtained. Two views of the bilateral hip obtained.  

 

FINDINGS:  Prostate brachytherapy seeds. There is no acute fracture/dislocation evident in the pelvis
.  Decreased osseous mineralization. There is degenerative change of the bilateral hips, right greate
r than left, with right superior hip bone-on-bone contact. The pubic symphysis and sacroiliac joints 
are unremarkable. Degenerative changes of the spine. 

 

Two views of the bilateral hips show no acute fracture or dislocation. There is a redemonstrated angely
gn-appearing sclerotic subcentimeter focus of the left proximal femur unchanged versus 2017 CT compar
lamin.  

 

IMPRESSION:

1. No acute fracture or dislocation in the pelvis or bilateral hips.

2. Degenerative changes of the bilateral hip joints, with bone-on-bone contact on the right.

## 2020-08-09 NOTE — P.HPIM
History of Present Illness


Patient is a pleasant 87-year-old male was sent in here because of the fever.  

Patient does have history of atrial ablation patient is found to be in A. fib 

with rapid unclear rate.  Patient does have dementia appears to be moderate, 

does get agitated for which patient is on Seroquel.  Patient was having altered 

mental status at home all the workup is negative except for urine which was 

abnormal for which patient was started on Rocephin.  Patient had Proteus 

mirabilis in the urine in the past that Proteus mirabilis was pansensitive.  

Patient is unable to provide much of the history because of his insulin for his 

hearing problems and baseline dementia.  Patient had temperature of 101.  

Patient heart rate was 1 20 bpm and patient clinically appears to be bit 

dehydrated.  Patient blood pressure was low as well.  Patient was on metoprolol 

which is being switched to amiodarone IV because of hypotension.  Is not easy to

obtain history from the patient but he denied any dysuria or increased urinary 

frequency.  Patient had a QT of 380 and QTC is around 490.  Chest x-ray did not 

show any obvious infiltrate hip x-ray and knee x-ray were not read by the 

radiologist at








Review of Systems








REVIEW OF SYSTEMS: 


Unable to obtain due to his clinical condition








Past Medical History


Past Medical History: Atrial Fibrillation, Asthma, Cancer, Prostate Disorder, Rh

eumatoid Arthritis (RA)


Additional Past Medical History / Comment(s): chronic back problems, Rt foot 

drop, Uses a walker, prostate cancer, radiation seeds


History of Any Multi-Drug Resistant Organisms: ESBL


Date of last positivie culture/infection: 09/03/17


MDRO Source:: URINE ESBL


Past Surgical History: Back Surgery


Additional Past Surgical History / Comment(s): RECENT PICC LINE, NOW REMOVED, 

EVA CATARACTS, R hand surgery


Past Anesthesia/Blood Transfusion Reactions: No Reported Reaction


Past Psychological History: No Psychological Hx Reported


Smoking Status: Never smoker


Past Alcohol Use History: None Reported


Past Drug Use History: None Reported





- Past Family History


  ** Father


Family Medical History: No Reported History





  ** Mother


Family Medical History: GI Bleed





Medications and Allergies


                                Home Medications











 Medication  Instructions  Recorded  Confirmed  Type


 


Folic Acid 1 mg PO HS@2000 08/16/17 08/09/20 History


 


Apixaban [Eliquis] 2.5 mg PO BID  tab 08/22/17 08/09/20 Rx


 


Atorvastatin [Lipitor] 40 mg PO HS  tab 08/22/17 08/09/20 Rx


 


ALPRAZolam [Xanax] 0.5 mg PO BID PRN 08/09/20 08/09/20 History


 


Breo Unknown 1 puff INHALATION RT-DAILY 08/09/20 08/09/20 History


 


Colloidal Oatmeal [Eucerin Eczema 1 applic TOPICAL DAILY PRN 08/09/20 08/09/20 

History





Relief]    


 


Cyanocobalamin [Vitamin B-12] 500 mcg PO DAILY@0800 08/09/20 08/09/20 History


 


Donepezil [Aricept] 10 mg PO DAILY@0800 08/09/20 08/09/20 History


 


Ergocalciferol [Vitamin D2 50,000 unit PO MIR 08/09/20 08/09/20 History





(DRISDOL)]    


 


Ipratropium-Albuterol Nebulize 3 ml INHALATION RT-QID PRN 08/09/20 08/09/20 

History





[Duoneb 0.5 mg-3 mg/3 ml Soln]    


 


Metoprolol Tartrate [Lopressor] 12.5 mg PO BID 08/09/20 08/09/20 History


 


Mirtazapine [Remeron] 30 mg PO HS 08/09/20 08/09/20 History


 


Pantoprazole [Protonix] 40 mg PO DAILY@0800 08/09/20 08/09/20 History


 


QUEtiapine [SEROquel] 50 mg PO HS@2000 08/09/20 08/09/20 History








                                    Allergies











Allergy/AdvReac Type Severity Reaction Status Date / Time


 


No Known Allergies Allergy   Verified 08/09/20 07:47














Physical Exam


Vitals: 


                                   Vital Signs











  Temp Pulse Resp BP Pulse Ox


 


 08/09/20 10:00  98.3 F    


 


 08/09/20 08:15  101.7 F H  119 H  18  92/51  96


 


 08/09/20 04:05    16  


 


 08/09/20 00:10  98.9 F  132 H  20  137/73  96


 


 08/08/20 23:43   147 H  17  








                                Intake and Output











 08/08/20 08/09/20 08/09/20





 22:59 06:59 14:59


 


Intake Total  100 


 


Output Total   0


 


Balance  100 0


 


Intake:   


 


  Oral  100 


 


Output:   


 


  Urine   0


 


Other:   


 


  Voiding Method  Urinal Toilet





   Urinal


 


  # Voids  1 0


 


  Weight  64.5 kg 64.5 kg

















PHYSICAL EXAMINATION: 





GENERAL: The patient is alert and oriented x1-2, not in any acute distress.  

Thin built with dry because membranes. 


HEENT: Pupils are round and equally reacting to light. EOMI. No scleral icterus.

 No conjunctival pallor. Normocephalic, atraumatic. No pharyngeal erythema. No 

thyromegaly. 


CARDIOVASCULAR: S1 and S2 present. No murmurs, rubs, or gallops. 


Fibrillation with rapid ventricular rate


PULMONARY: Chest is clear to auscultation, no wheezing or crackles. 


ABDOMEN: Soft, nontender, nondistended, normoactive bowel sounds. No palpable 

organomegaly. 


MUSCULOSKELETAL: No joint swelling or deformity.


EXTREMITIES: No cyanosis, clubbing, or pedal edema. 


NEUROLOGICAL: No focal deficits patient is confused because of his baseline 

dementia


SKIN: No rashes. 

















Results


CBC & Chem 7: 


                                 08/09/20 01:09





                                 08/09/20 01:09


Labs: 


                  Abnormal Lab Results - Last 24 Hours (Table)











  08/09/20 08/09/20 08/09/20 Range/Units





  00:27 01:09 01:09 


 


WBC  14.7 H  14.9 H   (3.8-10.6)  k/uL


 


RBC  3.98 L  3.72 L   (4.30-5.90)  m/uL


 


Hgb  11.8 L  11.1 L   (13.0-17.5)  gm/dL


 


Hct  36.7 L  34.7 L   (39.0-53.0)  %


 


Neutrophils #  12.7 H  13.1 H   (1.3-7.7)  k/uL


 


Lymphocytes #  0.8 L  0.6 L   (1.0-4.8)  k/uL


 


Sodium    135 L  (137-145)  mmol/L


 


Carbon Dioxide    21 L  (22-30)  mmol/L


 


BUN    32 H  (9-20)  mg/dL


 


Glucose    121 H  (74-99)  mg/dL


 


Total Protein    6.1 L  (6.3-8.2)  g/dL


 


Albumin    2.8 L  (3.5-5.0)  g/dL


 


Urine Protein     (Negative)  


 


Urine WBC     (0-5)  /hpf


 


Urine WBC Clumps     (None)  /hpf


 


Urine Mucus     (None)  /hpf














  08/09/20 Range/Units





  03:30 


 


WBC   (3.8-10.6)  k/uL


 


RBC   (4.30-5.90)  m/uL


 


Hgb   (13.0-17.5)  gm/dL


 


Hct   (39.0-53.0)  %


 


Neutrophils #   (1.3-7.7)  k/uL


 


Lymphocytes #   (1.0-4.8)  k/uL


 


Sodium   (137-145)  mmol/L


 


Carbon Dioxide   (22-30)  mmol/L


 


BUN   (9-20)  mg/dL


 


Glucose   (74-99)  mg/dL


 


Total Protein   (6.3-8.2)  g/dL


 


Albumin   (3.5-5.0)  g/dL


 


Urine Protein  1+ H  (Negative)  


 


Urine WBC  >182 H  (0-5)  /hpf


 


Urine WBC Clumps  Many H  (None)  /hpf


 


Urine Mucus  Rare H  (None)  /hpf








                      Microbiology - Last 24 Hours (Table)











 08/09/20 03:30 Urine Culture - Preliminary





 Urine,Voided 














Thrombosis Risk Factor Assmnt





- Choose All That Apply


Any of the Below Risk Factors Present?: Yes


Other Risk Factors: Yes


Each Risk Factor Represents 3 Points: Age 75 years or older


Thrombosis Risk Factor Assessment Total Risk Factor Score: 3


Thrombosis Risk Factor Assessment Level: Moderate Risk





Assessment and Plan


Plan: 


-Sepsis secondary to possible urinary tract infection continue with urine cul

tures can you to Rocephin.  


- toxic encephalopathy from sepsis 


- atrial fibrillation with rapid ventricular rate patient was started on 

amiodarone and A. fib is precipitated by sepsis,Patient is on Eliquis which will

 be continued


Asthma without any acute exacerbation 


-benign prostatic hypertrophy


-Senile or vascular dementia: Patient will review chest started on Seroquel and 

as-needed basis nighttime for agitation.  


-Dehydration: Patient was started on 68 mL of IV fluids which will be switched 

to 100 mL for today when he to cut back on the fluids and closely monitor for 

any pulmonary edema patient had previous ejection fraction of 50-55% no evidence

 of any CHF at this time


-Depression continue with the Lexapro next and heparin gastroesophageal reflux 

disease

## 2020-08-09 NOTE — P.CRDCN
History of Present Illness


Consult date: 08/09/20


Chief complaint: Generalized weakness


History of present illness: 





This is a very pleasant 87-year-old gentleman with a past medical history signi

ficant for long-standing persistent atrial fibrillation on oral anticoagulation 

as well as underlying dementia was brought to the hospital by his family because

he was not feeling well.  Unfortunately the patient lost his wife few days ago. 

Since then he has not been eating and drinking well.  He was not feeling well.  

He was feeling tired and fatigued and has no energy.  This morning when the emerita lamasnt was seen he was experiencing shivering and the temperature was elevated at

101.  No symptoms of dizziness or lightheadedness or syncope.  No symptoms of 

chest pain or chest discomfort, shortness of breath, or any feeling of heart 

racing or fluttering.  The patient was diagnosed with dehydration and also the 

urine analysis revealed evidence of urinary tract infection and currently he is 

on antibiotic.  We involved in the care of the patient for further evaluation of

atrial fibrillation with RVR.  The heart rate has been around 120 beats per 

minutes.  The blood pressure has been marginal and it has been in the 90s.  The 

patient is on metoprolol at 25 mg by mouth twice a day.  He is also on oral 

anticoagulation.  I am going to DC the metoprolol and start the patient on 

amiodarone bolus and drip.  Continue oral anticoagulation at this point.  Beside

that he is in process of getting a urine culture and blood culture as well.





Past Medical History


Past Medical History: Atrial Fibrillation, Asthma, Cancer, Prostate Disorder, 

Rheumatoid Arthritis (RA)


Additional Past Medical History / Comment(s): chronic back problems, Rt foot 

drop, Uses a walker, prostate cancer, radiation seeds


History of Any Multi-Drug Resistant Organisms: ESBL


Date of last positivie culture/infection: 09/03/17


MDRO Source:: URINE ESBL


Past Surgical History: Back Surgery


Additional Past Surgical History / Comment(s): RECENT PICC LINE, NOW REMOVED, 

EVA CATARACTS, R hand surgery


Past Anesthesia/Blood Transfusion Reactions: No Reported Reaction


Past Psychological History: No Psychological Hx Reported


Smoking Status: Never smoker


Past Alcohol Use History: None Reported


Past Drug Use History: None Reported





- Past Family History


  ** Father


Family Medical History: No Reported History





  ** Mother


Family Medical History: GI Bleed





Medications and Allergies


                                Home Medications











 Medication  Instructions  Recorded  Confirmed  Type


 


Folic Acid 1 mg PO HS@2000 08/16/17 08/09/20 History


 


Apixaban [Eliquis] 2.5 mg PO BID  tab 08/22/17 08/09/20 Rx


 


Atorvastatin [Lipitor] 40 mg PO HS  tab 08/22/17 08/09/20 Rx


 


ALPRAZolam [Xanax] 0.5 mg PO BID PRN 08/09/20 08/09/20 History


 


Breo Unknown 1 puff INHALATION RT-DAILY 08/09/20 08/09/20 History


 


Colloidal Oatmeal [Eucerin Eczema 1 applic TOPICAL DAILY PRN 08/09/20 08/09/20 

History





Relief]    


 


Cyanocobalamin [Vitamin B-12] 500 mcg PO DAILY@0800 08/09/20 08/09/20 History


 


Donepezil [Aricept] 10 mg PO DAILY@0800 08/09/20 08/09/20 History


 


Ergocalciferol [Vitamin D2 50,000 unit PO MIR 08/09/20 08/09/20 History





(DRISDOL)]    


 


Ipratropium-Albuterol Nebulize 3 ml INHALATION RT-QID PRN 08/09/20 08/09/20 

History





[Duoneb 0.5 mg-3 mg/3 ml Soln]    


 


Metoprolol Tartrate [Lopressor] 12.5 mg PO BID 08/09/20 08/09/20 History


 


Mirtazapine [Remeron] 30 mg PO HS 08/09/20 08/09/20 History


 


Pantoprazole [Protonix] 40 mg PO DAILY@0800 08/09/20 08/09/20 History


 


QUEtiapine [SEROquel] 50 mg PO HS@2000 08/09/20 08/09/20 History








                                    Allergies











Allergy/AdvReac Type Severity Reaction Status Date / Time


 


No Known Allergies Allergy   Verified 08/09/20 07:47














Physical Exam


Vitals: 


                                   Vital Signs











  Temp Pulse Resp BP Pulse Ox


 


 08/09/20 08:15  101.7 F H  119 H  18  92/51  96


 


 08/09/20 04:05    16  


 


 08/09/20 00:10  98.9 F  132 H  20  137/73  96


 


 08/08/20 23:43   147 H  17  








                                Intake and Output











 08/08/20 08/09/20 08/09/20





 22:59 06:59 14:59


 


Intake Total  100 


 


Balance  100 


 


Intake:   


 


  Oral  100 


 


Other:   


 


  Voiding Method  Urinal Toilet





   Urinal


 


  # Voids  1 


 


  Weight  64.5 kg 














- Constitutional


General appearance: no acute distress





- Respiratory


Respiratory: bilateral: CTA





- Cardiovascular


Rhythm: regular


Heart sounds: normal: S1, S2


Abnormal Heart Sounds: systolic murmur





Results





                                 08/09/20 01:09





                                 08/09/20 01:09


                                 Cardiac Enzymes











  08/09/20 08/09/20 Range/Units





  01:09 01:09 


 


AST   22  (17-59)  U/L


 


Troponin I  <0.012   (0.000-0.034)  ng/mL








                                       CBC











  08/09/20 08/09/20 Range/Units





  00:27 01:09 


 


WBC  14.7 H  14.9 H  (3.8-10.6)  k/uL


 


RBC  3.98 L  3.72 L  (4.30-5.90)  m/uL


 


Hgb  11.8 L  11.1 L  (13.0-17.5)  gm/dL


 


Hct  36.7 L  34.7 L  (39.0-53.0)  %


 


Plt Count  284  286  (150-450)  k/uL








                          Comprehensive Metabolic Panel











  08/09/20 Range/Units





  01:09 


 


Sodium  135 L  (137-145)  mmol/L


 


Potassium  3.7  (3.5-5.1)  mmol/L


 


Chloride  106  ()  mmol/L


 


Carbon Dioxide  21 L  (22-30)  mmol/L


 


BUN  32 H  (9-20)  mg/dL


 


Creatinine  0.72  (0.66-1.25)  mg/dL


 


Glucose  121 H  (74-99)  mg/dL


 


Calcium  8.4  (8.4-10.2)  mg/dL


 


AST  22  (17-59)  U/L


 


ALT  18  (4-49)  U/L


 


Alkaline Phosphatase  90  ()  U/L


 


Total Protein  6.1 L  (6.3-8.2)  g/dL


 


Albumin  2.8 L  (3.5-5.0)  g/dL








                               Current Medications











Generic Name Dose Route Start Last Admin





  Trade Name Freq  PRN Reason Stop Dose Admin


 


Albuterol/Ipratropium  3 ml  08/09/20 08:00  08/09/20 07:46





  Duoneb 0.5 Mg-3 Mg/3 Ml Soln  INHALATION   Not Given





  RT-QID SRIDHAR  


 


Albuterol/Ipratropium  3 ml  08/09/20 00:30 





  Duoneb 0.5 Mg-3 Mg/3 Ml Soln  INHALATION  





  RT-Q2H PRN  





  Shortness Of Breath Or Wheezing  


 


Alprazolam  0.5 mg  08/09/20 00:44 





  Xanax  PO  





  BID PRN  





  Agitation  


 


Apixaban  2.5 mg  08/09/20 09:00 





  Eliquis  PO  





  BID [facility]  


 


Atorvastatin Calcium  40 mg  08/09/20 21:00 





  Lipitor  PO  





  HS [facility]  


 


Budesonide/Formoterol Fumarate  2 puff  08/09/20 09:00  08/09/20 07:46





  Symbicort 80-4.5 Mcg Inhaler  INHALATION   Not Given





  RT-BID [facility]  


 


Cyanocobalamin  500 mcg  08/09/20 09:00 





  Vitamin B-12  PO  





  DAILY [facility]  


 


Escitalopram Oxalate  10 mg  08/09/20 09:00 





  Lexapro  PO  





  DAILY SRIDHAR  


 


Folic Acid  1 mg  08/09/20 09:00 





  Folic Acid  PO  





  QAM SRIDHAR  


 


Sodium Chloride  1,000 mls @ 60 mls/hr  08/09/20 00:15  08/09/20 02:16





  Saline 0.9%  IV   60 mls/hr





  .[id number] SRIDHAR   Administration


 


Ceftriaxone Sodium 1 gm/  50 mls @ 100 mls/hr  08/09/20 02:00  08/09/20 04:42





  Sodium Chloride  IVPB   100 mls/hr





  HS SRIDHAR   Administration


 


Amiodarone HCl 360 mg/  200 mls @ 33.333 mls/hr  08/09/20 08:38 





  Dextrose/Water  IV  08/09/20 14:37 





  .Q6H ONE  





  Protocol  





  1 MG/MIN  


 


Amiodarone HCl 300 mg/  250 mls @ 25 mls/hr  08/09/20 14:38 





  Dextrose/Water  IV  08/10/20 08:37 





  .Q10H SRIDHAR  





  Protocol  





  0.5 MG/MIN  


 


Sodium Chloride  250 mls @ 999 mls/hr  08/09/20 09:00 





  Saline 0.9%  IV  08/09/20 09:15 





  .Q16M ONE  


 


Miscellaneous Information  1 each  08/09/20 00:58 





  Magnesium Per Protocol  MISCELLANE  





  DAILY PRN  





  Per Protocol  





  Protocol  


 


Miscellaneous Information  1 each  08/09/20 00:58 





  Potassium Per Protocol  MISCELLANE  





  DAILY PRN  





  Per Protocol  





  Protocol  


 


Multi-Ingred Cream/Lotion/Oil/Oint  1 applic  08/09/20 00:45 





  Eucerin Cream  TOPICAL  





  BID PRN  





  Dry Skin  


 


Multivitamins  1 each  08/09/20 12:00 





  Theragran  PO  





  DAILY@1200 SRIDHAR  


 


Pantoprazole Sodium  40 mg  08/09/20 09:00 





  Protonix  IVP  





  DAILY SRIDHAR  


 


Quetiapine Fumarate  50 mg  08/09/20 21:00 





  Seroquel  PO  





  HS SRIDHAR  








                                Intake and Output











 08/08/20 08/09/20 08/09/20





 22:59 06:59 14:59


 


Intake Total  100 


 


Balance  100 


 


Intake:   


 


  Oral  100 


 


Other:   


 


  Voiding Method  Urinal Toilet





   Urinal


 


  # Voids  1 


 


  Weight  64.5 kg 








                                        





                                 08/09/20 01:09 





                                 08/09/20 01:09 











Assessment and Plan


Assessment: 





Assessment


#1 urosepsis


#2 dehydration


#3 atrial fibrillation with RVR secondary to the above


#4 underlying dementia





Plan


#1 hold metoprolol in view of the marginal blood pressure


#2 start the patient on amiodarone was bolus and drip and switched to amiodarone

 by mouth down the line 


#3 give the patient a bolus of IV fluid of 250 cc normal saline


#4 continue oral anticoagulation


#5 transferred to the ICU if he goes unstable

## 2020-08-09 NOTE — XR
EXAMINATION TYPE: XR knee complete bilateral

 

DATE OF EXAM: 8/9/2020

 

CLINICAL HISTORY: Pain after fall

 

TECHNIQUE: Frontal, oblique, and lateral views of the bilateral knees are obtained.

 

COMPARISON: None.

 

FINDINGS:  There is no acute fracture/dislocation evident in bilateral knee.  Chondrocalcinosis bilat
erally. The right knee demonstrates marked tricompartmental degenerative spurring, medial compartment
 joint space narrowing, large suprapatellar joint effusion, and edema of the infrapatellar Hoffa's fa
t pad. Left knee demonstrates lateral compartment and patellofemoral degenerative spurring, and no dodson
prapatellar joint effusion.

 

IMPRESSION:

1. No acute fracture or dislocation in the bilateral knees.

2. Right knee marked tricompartmental osteoarthrosis, medial compartment joint space narrowing, large
 suprapatellar joint effusion, and infrapatellar fat pad edema.

2. Mild bicompartmental osteoarthrosis of the left knee.

## 2020-08-10 NOTE — P.PN
Subjective


Progress Note Date: 08/10/20





Pt febrile to 99.8 overnight, vitals otherwise have remained stable. Urine 

culture growing gram negative rods. He is overall feeling well today, returning 

to his baseline. He was resumed on lopressor by cardiology.





Objective





- Vital Signs


Vital signs: 


                                   Vital Signs











Temp  98.4 F   08/10/20 20:00


 


Pulse  80   08/10/20 20:00


 


Resp  16   08/10/20 20:00


 


BP  100/53   08/10/20 20:00


 


Pulse Ox  95   08/10/20 20:00








                                 Intake & Output











 08/10/20 08/10/20 08/11/20





 06:59 18:59 06:59


 


Intake Total 250 420 


 


Output Total 200 1 


 


Balance 50 419 


 


Weight 64 kg  


 


Intake:   


 


  Intake, IV Titration 250  





  Amount   


 


    Amiodarone 300 mg In 250  





    Dextrose 5% in Water 250   





    ml @ 0.5 MG/MIN 25 mls/hr   





    IV .Q10H SRIDHAR Rx#:   





    [id number]   


 


  Oral  420 


 


Output:   


 


  Urine 200  


 


  Stool  1 


 


Other:   


 


  Voiding Method Urinal Incontinent 


 


  # Voids 1 1 1


 


  # Bowel Movements  1 1














- Exam





General: well developed, well nourished elderly male in NAD


CV: irregularly irregular, no murmur


Lungs: Normal effort, clear throughout


Abd: soft, nontender


Neuro: alert and oriented





- Labs


CBC & Chem 7: 


                                 08/10/20 06:10





                                 08/10/20 06:10


Labs: 


                  Abnormal Lab Results - Last 24 Hours (Table)











  08/10/20 08/10/20 Range/Units





  06:10 06:10 


 


WBC   13.8 H  (3.8-10.6)  k/uL


 


RBC   3.30 L  (4.30-5.90)  m/uL


 


Hgb   9.8 L  (13.0-17.5)  gm/dL


 


Hct   30.0 L  (39.0-53.0)  %


 


Sodium  136 L   (137-145)  mmol/L


 


Potassium  3.4 L   (3.5-5.1)  mmol/L


 


Chloride  108 H   ()  mmol/L


 


Creatinine  0.52 L   (0.66-1.25)  mg/dL


 


Calcium  7.8 L   (8.4-10.2)  mg/dL








                      Microbiology - Last 24 Hours (Table)











 08/09/20 09:04 Blood Culture - Preliminary





 Blood    No Growth after 24 hours


 


 08/09/20 03:30 Urine Culture - Preliminary





 Urine,Voided    Gram Neg Bacilli


 


 08/09/20 08:44 Blood Culture - Preliminary





 Blood    No Growth after 24 hours














Assessment and Plan


(1) Sepsis secondary to UTI


Current Visit: Yes   Status: Acute   Code(s): A41.9 - SEPSIS, UNSPECIFIED 

ORGANISM; N39.0 - URINARY TRACT INFECTION, SITE NOT SPECIFIED   SNOMED Code(s): 

753951133


   





(2) Acute kidney injury


Current Visit: Yes   Status: Acute   Code(s): N17.9 - ACUTE KIDNEY FAILURE, UNS

PECIFIED   SNOMED Code(s): 28060087


   





(3) Paroxysmal atrial fibrillation


Current Visit: Yes   Status: Acute   Code(s): I48.0 - PAROXYSMAL ATRIAL 

FIBRILLATION   SNOMED Code(s): 575652183


   





(4) Atrial fibrillation with RVR


Current Visit: No   Status: Acute   Code(s): I48.91 - UNSPECIFIED ATRIAL 

FIBRILLATION   SNOMED Code(s): 849071772760802


   


Plan: 


Continue rocephin for urosepsis, urine culture growing gram negative rods. 

Cardiology following, lopressor resumed, HR and BP stable. Follow cultures and 

can plan for dc to [facility] pending sensitivity

## 2020-08-10 NOTE — P.PN
Subjective


Progress Note Date: 08/10/20


This is a pleasant 87-year-old gentleman with a past history of long-standing 

persistent atrial fibrillation, on oral anticoagulation, underlying dementia.  

Recently lost his wife and has not been eating and drinking well.  He was 

brought to the hospital by his family because he was feeling well.  This was 

found to be highly dehydrated with evidence of a UTI and found to be in atrial 

fibrillation with RVR.  Blood pressure has been low and his beta blocker was put

on hold and he was started on amiodarone drip.  Heart rate remains around 100 

bpm with a blood pressure in the mid 90s.  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  The 

initial plan was to initiate oral amiodarone once the drip was done however 

according to nursing staff patient's family does not want him on oral amiodarone

long-term.








Objective





- Vital Signs


Vital signs: 


                                   Vital Signs











Temp  99.0 F   08/10/20 08:00


 


Pulse  115 H  08/10/20 11:20


 


Resp  16   08/10/20 11:20


 


BP  122/70   08/10/20 11:20


 


Pulse Ox  95   08/10/20 08:00








                                 Intake & Output











 08/09/20 08/10/20 08/10/20





 18:59 06:59 18:59


 


Intake Total  250 


 


Output Total 200 200 


 


Balance -200 50 


 


Weight 64.5 kg 64 kg 


 


Intake:   


 


  Intake, IV Titration  250 





  Amount   


 


    Amiodarone 300 mg In  250 





    Dextrose 5% in Water 250   





    ml @ 0.5 MG/MIN 25 mls/hr   





    IV .Q10H [facility] Rx#:   





    [id number]   


 


Output:   


 


  Urine 200 200 


 


Other:   


 


  Voiding Method Toilet Urinal 





 Urinal  


 


  # Voids 1 1 2


 


  # Bowel Movements 1  














- Exam


PHYSICAL EXAMINATION: 





HEENT: Head is atraumatic, normocephalic.  Pupils equal, round.  Neck is supple.

 There is no elevated jugular venous pressure.





HEART EXAMINATION: Heart sounds irregularly irregular, S1 and S2 with a systolic

murmur.





CHEST EXAMINATION: Lungs are clear to auscultation. No chest wall tenderness is 

noted on palpation or with deep breathing.





ABDOMEN:  Soft, nontender. Bowel sounds are heard. No organomegaly noted.


 


EXTREMITIES: 2+ peripheral pulses with no evidence of peripheral edema and no 

calf tenderness noted.





NEUROLOGIC patient is awake, alert and oriented x2.


 


.


 











- Labs


CBC & Chem 7: 


                                 08/10/20 06:10





                                 08/10/20 06:10


Labs: 


                  Abnormal Lab Results - Last 24 Hours (Table)











  08/10/20 08/10/20 Range/Units





  06:10 06:10 


 


WBC   13.8 H  (3.8-10.6)  k/uL


 


RBC   3.30 L  (4.30-5.90)  m/uL


 


Hgb   9.8 L  (13.0-17.5)  gm/dL


 


Hct   30.0 L  (39.0-53.0)  %


 


Sodium  136 L   (137-145)  mmol/L


 


Potassium  3.4 L   (3.5-5.1)  mmol/L


 


Chloride  108 H   ()  mmol/L


 


Creatinine  0.52 L   (0.66-1.25)  mg/dL


 


Calcium  7.8 L   (8.4-10.2)  mg/dL








                      Microbiology - Last 24 Hours (Table)











 08/09/20 09:04 Blood Culture - Preliminary





 Blood    No Growth after 24 hours


 


 08/09/20 03:30 Urine Culture - Preliminary





 Urine,Voided    Gram Neg Bacilli


 


 08/09/20 08:44 Blood Culture - Preliminary





 Blood    No Growth after 24 hours














Assessment and Plan


Assessment: 


#1 urosepsis


#2 dehydration


#3 long-standing persistent atrial fibrillation with rapid ventricular response


#4 dementia





Plan: 


From cardiology's perspective, we will resume metoprolol 12.5 mg by mouth twice 

a day and hold for systolic blood pressure less than 90.  Obtain a 2-D echo with

Doppler to assess LV systolic function.  If the echo shows no evidence of heart 

failure we will hydrate the patient more aggressively.  We'll continue to follow

the patient and provide further recommendations accordingly.





NP note has been reviewed, I agree with a documented findings and plan of care. 

Patient was seen and examined.

## 2020-08-10 NOTE — ECHOF
Referral Reason:AF w/RVR



MEASUREMENTS

--------

HEIGHT: 182.9 cm

WEIGHT: 81.6 kg

BP: 

RVIDd:   3.4 cm     (< 3.3)

IVSd:   1.1 cm     (0.6 - 1.1)

LVIDd:   3.7 cm     (3.9 - 5.3)

LVPWd:   1.2 cm     (0.6 - 1.1)

EDV(Teich):   60 ml

IVSs:   1.3 cm

LVIDs:   2.8 cm

LVPWs:   1.5 cm

%IVS Thck:   24 %

ESV(Teich):   30 ml

EF(Teich):   49 %

%FS:   25 %

SV(Teich):   29 ml

Ao Diam:   3.3 cm     (2.0 - 3.7)

AV Cusp:   1.5 cm     (1.5 - 2.6)

LA Diam:   4.8 cm     (2.7 - 3.8)

MV EXCURSION:   21.866 mm     (> 18.000)

MV EF SLOPE:   181 mm/s     (70 - 150)

EPSS:   0.8 cm

MV E Jax:   0.83 m/s

MV DecT:   136 ms

MV Dec Ches[place]:   6.1 m/s

MV A Jax:   0.49 m/s

MV E/A Ratio:   1.69 

MV PHT:   39 ms

TR Vmax:   3.01 m/s

TR maxP.26 mmHg

RAP:   5.00 mmHg

RVSP:   41.26 mmHg







FINDINGS

--------

Sinus rhythm with extra systolic beats.

This was a technically good study.

The left ventricular size is normal.   Overall left ventricular systolic function is low-normal with,
 an EF between 50 - 55 %.

The right ventricle is normal in size.

The left atrium is moderately dilated.

The right atrial size is normal.

The aortic valve is trileaflet, and appears structurally normal. No aortic stenosis or regurgitation.


Mild mitral regurgitation is present.

Mild tricuspid regurgitation present.   There is mild pulmonary hypertension.

There is no pulmonic regurgitation present.

The aortic root size is normal.

Echo free space represents a pericardial fat pad.



CONCLUSIONS

--------

1. The left ventricular size is normal.

2. Overall left ventricular systolic function is low-normal with, an EF between 50 - 55 %.

3. The right ventricle is normal in size.

4. The left atrium is moderately dilated.

5. Mild mitral regurgitation is present.

6. Mild tricuspid regurgitation present.

7. There is mild pulmonary hypertension.

8. Echo free space represents a pericardial fat pad.





SONOGRAPHER: Amada Beckman RDCS

## 2020-08-11 NOTE — P.DS
Providers


Date of admission: 


08/08/20 23:29





Expected date of discharge: 08/11/20


Attending physician: 


Tyron Hummel MD





Consults: 





                                        





08/09/20 01:38


Consult Physician Routine 


   Consulting Provider: Bal Bustillos


   Consult Reason/Comments: Afib


   Do you want consulting provider notified?: Yes, Notify in am











Primary care physician: 


Angelica Alvarez








- Discharge Diagnosis(es)


(1) Sepsis secondary to UTI


Current Visit: Yes   Status: Acute   





(2) Acute kidney injury


Current Visit: Yes   Status: Acute   





(3) Paroxysmal atrial fibrillation


Current Visit: Yes   Status: Acute   





(4) Atrial fibrillation with RVR


Current Visit: No   Status: Acute   


Hospital Course: 





Patient is a pleasant 87-year-old male was sent in here because of the fever.  

Patient does have history of atrial ablation patient is found to be in A. fib 

with rapid unclear rate.  Patient does have dementia appears to be moderate, 

does get agitated for which patient is on Seroquel.  Patient was having altered 

mental status at home all the workup is negative except for urine.  Patient had 

Proteus mirabilis in the urine in the past that Proteus mirabilis was 

pansensitive.  Patient is unable to provide much of the history because of his 

insulin for his hearing problems and baseline dementia.  Patient had temperature

of 101.  Patient heart rate was 1 20 bpm and patient clinically appears to be 

bit dehydrated.  Patient blood pressure was low as well.  Patient was on 

metoprolol which is being switched to amiodarone IV because of hypotension.  Is 

not easy to obtain history from the patient but he denied any dysuria or 

increased urinary frequency.  Patient had a QT of 380 and QTC is around 490.  

Chest x-ray did not show any obvious infiltrate.





Pt was admitted to medicine and started on rocephin. Cardiology was consulted 

for his A fib and initially gave pt a loading dose of amiodarone. Echo performed

which showed normal LVEF, good valvular function. His urine culture did again 

grow proteus resistant to bacrim, cipro and macrobid, otherwise pan-sensitive. 

Upon discussion with pt's family, elected to control HR with metoprolol instead 

of amiodarone. On day of discharge, pt felt at baseline, denies chest pain, 

shortness of breath, nausea, vomiting or chills. He is discharged in stable 

condition and will resume metoprolol and complete a course of levaquin. 

Recommend repeat UA and CBC in 1 week.





Discharge exam


Gen: pleasant elderly male in NAD


CV: regular rate and rhythm. no murmur


Lungs: normal effort, clear thoughout


Abd: soft, nontender, non distended


Neuro: alert and oriented x3, no focal deficits


Patient Condition at Discharge: Fair





Plan - Discharge Summary


Discharge Rx Participant: No


New Discharge Prescriptions: 


New


   Levofloxacin [Levaquin] 500 mg PO DAILY 3 Days #3 tab


   Mirtazapine [Remeron] 30 mg PO HS #30 tab





Continue


   Folic Acid 1 mg PO HS@2000


   Apixaban [Eliquis] 2.5 mg PO BID  tab


   Atorvastatin [Lipitor] 40 mg PO HS  tab


   Ergocalciferol [Vitamin D2 (DRISDOL)] 50,000 unit PO MIR


   QUEtiapine [SEROquel] 50 mg PO HS@2000


   Metoprolol Tartrate [Lopressor] 12.5 mg PO BID


   Cyanocobalamin [Vitamin B-12] 500 mcg PO DAILY@0800


   Donepezil [Aricept] 10 mg PO DAILY@0800


   Colloidal Oatmeal [Eucerin Eczema Relief] 1 applic TOPICAL DAILY PRN


     PRN Reason: DRY FEET


   ALPRAZolam [Xanax] 0.5 mg PO BID PRN


     PRN Reason: Anxiety


   Pantoprazole [Protonix] 40 mg PO DAILY@0800


   Ipratropium-Albuterol Nebulize [Duoneb 0.5 mg-3 mg/3 ml Soln] 3 ml INHALATION

RT-QID PRN


     PRN Reason: Shortness Of Breath


   Breo Unknown 1 puff INHALATION RT-DAILY





Discontinued


   Mirtazapine [Remeron] 30 mg PO HS


Discharge Medication List





Folic Acid 1 mg PO HS@2000 08/16/17 [History]


Apixaban [Eliquis] 2.5 mg PO BID  tab 08/22/17 [Rx]


Atorvastatin [Lipitor] 40 mg PO HS  tab 08/22/17 [Rx]


ALPRAZolam [Xanax] 0.5 mg PO BID PRN 08/09/20 [History]


Breo Unknown 1 puff INHALATION RT-DAILY 08/09/20 [History]


Colloidal Oatmeal [Eucerin Eczema Relief] 1 applic TOPICAL DAILY PRN 08/09/20 

[History]


Cyanocobalamin [Vitamin B-12] 500 mcg PO DAILY@0800 08/09/20 [History]


Donepezil [Aricept] 10 mg PO DAILY@0800 08/09/20 [History]


Ergocalciferol [Vitamin D2 (DRISDOL)] 50,000 unit PO MIR 08/09/20 [History]


Ipratropium-Albuterol Nebulize [Duoneb 0.5 mg-3 mg/3 ml Soln] 3 ml INHALATION 

RT-QID PRN 08/09/20 [History]


Metoprolol Tartrate [Lopressor] 12.5 mg PO BID 08/09/20 [History]


Pantoprazole [Protonix] 40 mg PO DAILY@0800 08/09/20 [History]


QUEtiapine [SEROquel] 50 mg PO HS@2000 08/09/20 [History]


Levofloxacin [Levaquin] 500 mg PO DAILY 3 Days #3 tab 08/11/20 [Rx]


Mirtazapine [Remeron] 30 mg PO HS #30 tab 08/11/20 [Rx]








Follow up Appointment(s)/Referral(s): 


Bal Bustillos MD [STAFF PHYSICIAN] - 2 Weeks


[facility], [NON-STAFF] - 


Angelica Alvarez MD [Primary Care Provider] - 1 Week


Patient Instructions/Handouts:  Dehydration (DC), Urinary Tract Infection in Men

(DC)


Activity/Diet/Wound Care/Special Instructions: 


Patient needs wheelchair at discharge to assist with ADLs secondary to 

Rheumatoid Arthritis and Dementia 





Needs negative COVID 72hrs before discharge to return to [facility]


Patient resides at [facility] - family will transport home


Discharge Disposition: TRANSFER TO SNF/ECF

## 2020-08-11 NOTE — P.PN
Subjective


Progress Note Date: 08/11/20


This is a pleasant 87-year-old gentleman with a past history of long-standing 

persistent atrial fibrillation, on oral anticoagulation, underlying dementia.  

Recently lost his wife and has not been eating and drinking well.  He was 

brought to the hospital by his family because he was feeling well.  This was 

found to be highly dehydrated with evidence of a UTI and found to be in atrial 

fibrillation with RVR.  Blood pressure has been low and his beta blocker was put

on hold and he was started on amiodarone drip.  Heart rate remains around 100 

bpm with a blood pressure in the mid 90s.  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  The 

initial plan was to initiate oral amiodarone once the drip was done however 

according to nursing staff patient's family does not want him on oral amiodarone

long-term.





8/11/2020


The patient was seen and examined today resting comfortably in bed.  He says "I 

feel normal".  He continues to have no appetite.  Overall he appears to be a bit

stronger today but continues to verbalize feeling weak.  His heart rate remains 

90s to low 100s but his blood pressure has stabilized.  He's currently on 

metoprolol tartrate 12.5 mg by mouth twice a day.  Echocardiogram with Doppler 

study done yesterday showed low normal LV systolic function with ejection 

fraction between 50-55%, monitor, mild TR and mild pulmonary hypertension.








Objective





- Vital Signs


Vital signs: 


                                   Vital Signs











Temp  98.3 F   08/11/20 08:00


 


Pulse  68   08/11/20 08:28


 


Resp  16   08/11/20 08:00


 


BP  117/59   08/11/20 08:00


 


Pulse Ox  97   08/11/20 08:00








                                 Intake & Output











 08/10/20 08/11/20 08/11/20





 18:59 06:59 18:59


 


Intake Total 420 800 


 


Output Total 1  


 


Balance 419 800 


 


Weight  72 kg 


 


Intake:   


 


  Intake, IV Titration  800 





  Amount   


 


    Sodium Chloride 0.9% 1,  800 





    000 ml @ 100 mls/hr IV .   





    Q10H [facility] Rx#:[id number]   


 


  Oral 420  


 


Output:   


 


  Stool 1  


 


Other:   


 


  Voiding Method Incontinent Urinal Urinal





  Diaper Diaper





  Incontinent Incontinent


 


  # Voids 1 1 


 


  # Bowel Movements 1 1 














- Exam


PHYSICAL EXAMINATION: 





HEENT: Head is atraumatic, normocephalic.  Pupils equal, round.  Neck is supple.

 There is no elevated jugular venous pressure.





HEART EXAMINATION: Heart sounds irregularly irregular, S1 and S2 with a systolic

murmur.





CHEST EXAMINATION: Lungs are clear to auscultation. No chest wall tenderness is 

noted on palpation or with deep breathing.





ABDOMEN:  Soft, nontender. Bowel sounds are heard. No organomegaly noted.


 


EXTREMITIES: 2+ peripheral pulses with no evidence of peripheral edema and no 

calf tenderness noted.





NEUROLOGIC patient is awake, alert and oriented x2.











- Labs


CBC & Chem 7: 


                                 08/11/20 07:33





                                 08/11/20 06:03


Labs: 


                  Abnormal Lab Results - Last 24 Hours (Table)











  08/11/20 Range/Units





  07:33 


 


WBC  13.4 H  (3.8-10.6)  k/uL


 


RBC  3.40 L  (4.30-5.90)  m/uL


 


Hgb  10.0 L  (13.0-17.5)  gm/dL


 


Hct  31.2 L  (39.0-53.0)  %








                      Microbiology - Last 24 Hours (Table)











 08/09/20 09:04 Blood Culture - Preliminary





 Blood    No Growth after 48 hours


 


 08/09/20 08:44 Blood Culture - Preliminary





 Blood    No Growth after 48 hours


 


 08/09/20 03:30 Urine Culture - Final





 Urine,Voided    Proteus mirabilis














Assessment and Plan


Assessment: 


#1 urosepsis


#2 dehydration


#3 long-standing persistent atrial fibrillation 


#4 dementia





Plan: 


From cardiology's perspective, we will increase metoprolol to 25 mg by mouth 

twice a day.  From our standpoint the patient may be discharged back to an 

assisted living facility today.  He'll follow-up as an outpatient.





NP note has been reviewed, I agree with a documented findings and plan of care. 

Patient was seen and examined.

## 2022-08-02 NOTE — CT
EXAMINATION TYPE: CT brain steven olivas con

 

DATE OF EXAM: 8/2/2022

 

COMPARISON: 8/16/2017

 

HISTORY: fall

 

CT DLP: 1464.9 mGycm

Automated exposure control for dose reduction was used.

There is mild enlargement of the ventricles. There is some cerebral cortical atrophy. No mass effect 
or midline shift. No sign of intracranial hemorrhage. There are extra-axial calcified mass in the rig
ht parietal lobe convexity consistent with meningioma that measure 1.5 cm in diameter. There is also 
small meningioma in the right side of the anterior cerebral falx.

 

The cervical vertebra show degenerative disc space narrowing from C3 to C7 with spurring of the endpl
ates. Facet joints are intact. Prevertebral soft tissues are intact.

 

IMPRESSION:

Cervical spondylotic changes. No fracture.

 

Cerebral atrophy. No acute intracranial abnormality. Brain unchanged compared to old exam.

## 2022-08-02 NOTE — ED
Weakness HPI





- General


Chief complaint: Fall


Stated complaint: Fall, Fever, UTI


Time Seen by Provider: 08/02/22 01:34


Source: EMS


Mode of arrival: EMS


Limitations: altered mental status





- Related Data


                                Home Medications











 Medication  Instructions  Recorded  Confirmed


 


Folic Acid 1 mg PO HS@2000 08/16/17 08/09/20


 


ALPRAZolam [Xanax] 0.5 mg PO BID PRN 08/09/20 08/09/20


 


Breo Unknown 1 puff INHALATION RT-DAILY 08/09/20 08/09/20


 


Colloidal Oatmeal [Eucerin Eczema 1 applic TOPICAL DAILY PRN 08/09/20 08/09/20





Relief]   


 


Cyanocobalamin [Vitamin B-12] 500 mcg PO DAILY@0800 08/09/20 08/09/20


 


Donepezil [Aricept] 10 mg PO DAILY@0800 08/09/20 08/09/20


 


Ergocalciferol [Vitamin D2 50,000 unit PO MIR 08/09/20 08/09/20





(DRISDOL)]   


 


Ipratropium-Albuterol Nebulize 3 ml INHALATION RT-QID PRN 08/09/20 08/09/20





[Duoneb 0.5 mg-3 mg/3 ml Soln]   


 


Metoprolol Tartrate [Lopressor] 12.5 mg PO BID 08/09/20 08/09/20


 


Pantoprazole [Protonix] 40 mg PO DAILY@0800 08/09/20 08/09/20


 


QUEtiapine [SEROquel] 50 mg PO HS@2000 08/09/20 08/09/20








                                  Previous Rx's











 Medication  Instructions  Recorded


 


Apixaban [Eliquis] 2.5 mg PO BID  tab 08/22/17


 


Atorvastatin [Lipitor] 40 mg PO HS  tab 08/22/17


 


Mirtazapine [Remeron] 30 mg PO HS #30 tab 08/11/20


 


levoFLOXacin [Levaquin] 500 mg PO DAILY 3 Days #3 tab 08/11/20











                                    Allergies











Allergy/AdvReac Type Severity Reaction Status Date / Time


 


No Known Allergies Allergy   Verified 08/09/20 07:47














Review of Systems


ROS Statement: 


Those systems with pertinent positive or pertinent negative responses have been 

documented in the HPI.





ROS Other: All systems not noted in ROS Statement are negative.





Past Medical History


Past Medical History: Atrial Fibrillation, Asthma, Cancer, Prostate Disorder, 

Rheumatoid Arthritis (RA)


Additional Past Medical History / Comment(s): chronic back problems, Rt foot 

drop, Uses a walker, prostate cancer, radiation seeds


History of Any Multi-Drug Resistant Organisms: ESBL


Date of last positivie culture/infection: 09/03/17


MDRO Source:: URINE ESBL


Past Surgical History: Back Surgery


Additional Past Surgical History / Comment(s): RECENT PICC LINE, NOW REMOVED, 

EVA CATARACTS, R hand surgery


Past Anesthesia/Blood Transfusion Reactions: No Reported Reaction


Past Psychological History: No Psychological Hx Reported


Smoking Status: Never smoker


Past Alcohol Use History: None Reported


Past Drug Use History: None Reported





- Past Family History


  ** Father


Family Medical History: No Reported History





  ** Mother


Family Medical History: GI Bleed





General Exam


Limitations: altered mental status





Course


                                   Vital Signs











  08/02/22 08/02/22 08/02/22





  01:26 03:07 04:02


 


Temperature 99.9 F H 99.9 F H 99.7 F H


 


Pulse Rate 96  75


 


Pulse Rate [  86 





Cardiac Monitor   





]   


 


Respiratory 16 20 16





Rate   


 


Blood Pressure 120/62  110/83


 


Blood Pressure  129/87 





[Left Arm]   


 


O2 Sat by Pulse 94 L 96 96





Oximetry   














EKG Findings





- EKG Comments:


EKG Findings:: EKG is sinus 90  QRS 90 





Medical Decision Making





- Lab Data


Result diagrams: 


                                 08/02/22 01:51





                                 08/02/22 01:51


                                   Lab Results











  08/02/22 08/02/22 08/02/22 Range/Units





  01:51 01:51 01:51 


 


WBC    17.4 H  (3.8-10.6)  k/uL


 


RBC    3.97 L  (4.30-5.90)  m/uL


 


Hgb    11.8 L  (13.0-17.5)  gm/dL


 


Hct    35.1 L  (39.0-53.0)  %


 


MCV    88.3  (80.0-100.0)  fL


 


MCH    29.6  (25.0-35.0)  pg


 


MCHC    33.5  (31.0-37.0)  g/dL


 


RDW    13.3  (11.5-15.5)  %


 


Plt Count    345  (150-450)  k/uL


 


MPV    7.6  


 


Neutrophils %    89  %


 


Lymphocytes %    4  %


 


Monocytes %    6  %


 


Eosinophils %    1  %


 


Basophils %    0  %


 


Neutrophils #    15.5 H  (1.3-7.7)  k/uL


 


Lymphocytes #    0.6 L  (1.0-4.8)  k/uL


 


Monocytes #    1.0  (0-1.0)  k/uL


 


Eosinophils #    0.1  (0-0.7)  k/uL


 


Basophils #    0.0  (0-0.2)  k/uL


 


PT     (9.0-12.0)  sec


 


INR     (<1.2)  


 


APTT     (22.0-30.0)  sec


 


Sodium  134 L    (137-145)  mmol/L


 


Potassium  4.4    (3.5-5.1)  mmol/L


 


Chloride  105    ()  mmol/L


 


Carbon Dioxide  21 L    (22-30)  mmol/L


 


Anion Gap  8    mmol/L


 


BUN  22 H    (9-20)  mg/dL


 


Creatinine  0.82    (0.66-1.25)  mg/dL


 


Est GFR (CKD-EPI)AfAm  >90    (>60 ml/min/1.73 sqM)  


 


Est GFR (CKD-EPI)NonAf  78    (>60 ml/min/1.73 sqM)  


 


Glucose  137 H    (74-99)  mg/dL


 


Plasma Lactic Acid Mike     (0.7-2.0)  mmol/L


 


Calcium  8.4    (8.4-10.2)  mg/dL


 


Phosphorus  3.0    (2.5-4.5)  mg/dL


 


Magnesium  1.5 L    (1.6-2.3)  mg/dL


 


Total Bilirubin  1.1    (0.2-1.3)  mg/dL


 


AST  29    (17-59)  U/L


 


ALT  23    (4-49)  U/L


 


Alkaline Phosphatase  119    ()  U/L


 


Troponin I   <0.012   (0.000-0.034)  ng/mL


 


Total Protein  6.5    (6.3-8.2)  g/dL


 


Albumin  3.0 L    (3.5-5.0)  g/dL


 


Urine Color     


 


Urine Appearance     (Clear)  


 


Urine pH     (5.0-8.0)  


 


Ur Specific Gravity     (1.001-1.035)  


 


Urine Protein     (Negative)  


 


Urine Glucose (UA)     (Negative)  


 


Urine Ketones     (Negative)  


 


Urine Blood     (Negative)  


 


Urine Nitrite     (Negative)  


 


Urine Bilirubin     (Negative)  


 


Urine Urobilinogen     (<2.0)  mg/dL


 


Ur Leukocyte Esterase     (Negative)  


 


Urine RBC     (0-5)  /hpf


 


Urine WBC     (0-5)  /hpf


 


Urine WBC Clumps     (None)  /hpf


 


Ur Squamous Epith Cells     (0-4)  /hpf


 


Urine Bacteria     (None)  /hpf














  08/02/22 08/02/22 08/02/22 Range/Units





  01:51 01:51 04:58 


 


WBC     (3.8-10.6)  k/uL


 


RBC     (4.30-5.90)  m/uL


 


Hgb     (13.0-17.5)  gm/dL


 


Hct     (39.0-53.0)  %


 


MCV     (80.0-100.0)  fL


 


MCH     (25.0-35.0)  pg


 


MCHC     (31.0-37.0)  g/dL


 


RDW     (11.5-15.5)  %


 


Plt Count     (150-450)  k/uL


 


MPV     


 


Neutrophils %     %


 


Lymphocytes %     %


 


Monocytes %     %


 


Eosinophils %     %


 


Basophils %     %


 


Neutrophils #     (1.3-7.7)  k/uL


 


Lymphocytes #     (1.0-4.8)  k/uL


 


Monocytes #     (0-1.0)  k/uL


 


Eosinophils #     (0-0.7)  k/uL


 


Basophils #     (0-0.2)  k/uL


 


PT  12.4 H    (9.0-12.0)  sec


 


INR  1.2 H    (<1.2)  


 


APTT  23.3    (22.0-30.0)  sec


 


Sodium     (137-145)  mmol/L


 


Potassium     (3.5-5.1)  mmol/L


 


Chloride     ()  mmol/L


 


Carbon Dioxide     (22-30)  mmol/L


 


Anion Gap     mmol/L


 


BUN     (9-20)  mg/dL


 


Creatinine     (0.66-1.25)  mg/dL


 


Est GFR (CKD-EPI)AfAm     (>60 ml/min/1.73 sqM)  


 


Est GFR (CKD-EPI)NonAf     (>60 ml/min/1.73 sqM)  


 


Glucose     (74-99)  mg/dL


 


Plasma Lactic Acid Mike   1.4   (0.7-2.0)  mmol/L


 


Calcium     (8.4-10.2)  mg/dL


 


Phosphorus     (2.5-4.5)  mg/dL


 


Magnesium     (1.6-2.3)  mg/dL


 


Total Bilirubin     (0.2-1.3)  mg/dL


 


AST     (17-59)  U/L


 


ALT     (4-49)  U/L


 


Alkaline Phosphatase     ()  U/L


 


Troponin I     (0.000-0.034)  ng/mL


 


Total Protein     (6.3-8.2)  g/dL


 


Albumin     (3.5-5.0)  g/dL


 


Urine Color    Yellow  


 


Urine Appearance    Cloudy  (Clear)  


 


Urine pH    6.5  (5.0-8.0)  


 


Ur Specific Gravity    1.020  (1.001-1.035)  


 


Urine Protein    2+ H  (Negative)  


 


Urine Glucose (UA)    Negative  (Negative)  


 


Urine Ketones    1+ H  (Negative)  


 


Urine Blood    Small H  (Negative)  


 


Urine Nitrite    Negative  (Negative)  


 


Urine Bilirubin    Negative  (Negative)  


 


Urine Urobilinogen    <2.0  (<2.0)  mg/dL


 


Ur Leukocyte Esterase    Large H  (Negative)  


 


Urine RBC    7 H  (0-5)  /hpf


 


Urine WBC    >182 H  (0-5)  /hpf


 


Urine WBC Clumps    Few H  (None)  /hpf


 


Ur Squamous Epith Cells    1  (0-4)  /hpf


 


Urine Bacteria    Few H  (None)  /hpf














Disposition


Clinical Impression: 


 UTI (urinary tract infection), Fall





Disposition: ADMITTED AS IP TO THIS HOSP


Condition: Fair


Is patient prescribed a controlled substance at d/c from ED?: No


Referrals: 


Angelica Alvarez MD [Primary Care Provider] - 1-2 days

## 2022-08-02 NOTE — P.PCN
Date of Procedure: 08/02/22


Preoperative Diagnosis: 


Urine retention, history urethral strictures.


Postoperative Diagnosis: 


Same


Procedure(s) Performed: 


Dilation of urethral strictures with filiforms and followers 10-16-Marshallese.  

Placement of 12-Marshallese coud-tip catheter


Anesthesia: local


Surgeon: Clarence Braga


Pathology: none sent


Condition: stable


Indications for Procedure: 


The patient is in the hospital for urinary tract infection with sepsis.  He has 

a history of strictures.  The nursing staff some I will place a catheter.  He is

in urine retention.  I'll place a catheter


Description of Procedure: 


The patient is prepped and draped sterilely.  I attempted to pass a 16-Marshallese 

coud-tip catheter meet resistance the bulbar urethra.  The same as with a 12-

Marshallese coud-tip catheter.  I then pass a 4-Marshallese spiral filiform into the 

bladder.  I then sequentially diet late the urethral stricture in the bulbar 

urethra from 10-16-Marshallese.  I then am able to pass a 12-Marshallese coud-tip 

catheter and bladder with very cloudy looking urine over 500 mL





Impression urethral stricture disease causing urine retention.  Urinary tract i

nfection with sepsis.  Recommendations.  The catheter should stay in several 

days before it is removed.

## 2022-08-02 NOTE — P.HPIM
History of Present Illness


H&P Date: 08/02/22


Chief Complaint: Fevers, fall


This is a 84-year-old  gentleman with known history of paroxysmal 

atrial fibrillation, follows with Dr. Bustillos cardiologist, asthma, rheumatoid 

arthritis, history of prostate cancer status post radiation seeds, recurrent 

UTIs secondary to urinary retention related to urethral stricture lending to 

difficult catheter insertion/coude', history of cystoscopy with urethral 

dilations with , Urology, history of CVA with no residuals and multiple 

other medical issues sent to the ER from [facility].  Patient discovered on 

the floor at bedside with suspected fall out of bed, possbily hitting bedside 

table.  At the time of incident patient reported to have difficulty lifiting 

left left shoulder, with pain, febrile, temp of 102 with shaking/chills/ near 

rigors reported.  Received  cold wash cloth to forehead along with Tylenol and 

transported to the ER via EMS.T-max 99.9 on admission, post tylenol prior to 

transfer from [facility]. Vague historian secondary to underlying dementia.  WBC 17.4, 

hemoglobin 11.8, platelets 345, neutrophils 15.5, INR 1.2, sodium 134, potassium

4.4, bicarb 21, BUN 22, creatinine 0.8 to, GFR 78, lactic acid 1.4, magnesium 

1.5 troponin negative 1, albumin 3.  UA reported large leukocytes, 2+ protein, 

plus ketones, small amount of blood, urine wbc's greater than 182, elevated 

urine RBCs, few bacteria, culture with micro-pending.  Blood cultures obtained. 

Chest x-ray limited exam, reporting no active cardiopulmonary disease. 

Head/cervical spine CT reported mild enlargement of ventricles, some cerebral 

cortical atrophy , no mass effect or midline shift , no sign of intracranial 

hemorrhage ,extra-axial calcified mass in the right parietal lobe convexity 

consistent with meningioma measuring 1.5 cm in diameter, small meningioma in the

right side of the anterior cerebral falx, cervical spondylitic changes, no 

fracture, cerebral atrophy with no acute intracranial abnormality, brain 

unchanged compared to prior exam.  EKG reporting sinus rhythm with occasional 

PVC.Resting comfortably, mild diaphoresis, denies chest pain, palpitations or 

shortness of breath.  Denies nausea, vomiting or diarrhea.  Denies abdominal 

pain.  IV fluid hydration and IV antibiotics of Rocephin and Zosyn initiated.





Review of Systems





ROS Statement:


Limited as patient has baseline dementia .Those systems with pertinent positive 

or pertinent negative responses have been documented in the HPI.





ROS Other: All systems not noted in ROS Statement are negative.














Past Medical History


Past Medical History: Atrial Fibrillation, Asthma, Cancer, Prostate Disorder, 

Rheumatoid Arthritis (RA)


Additional Past Medical History / Comment(s): chronic back problems, Rt foot 

drop, Uses a walker, prostate cancer, radiation seeds


History of Any Multi-Drug Resistant Organisms: ESBL


Date of last positivie culture/infection: 09/03/17


MDRO Source:: URINE ESBL


Past Surgical History: Back Surgery


Additional Past Surgical History / Comment(s): RECENT PICC LINE, NOW REMOVED, 

EVA CATARACTS, R hand surgery


Past Anesthesia/Blood Transfusion Reactions: No Reported Reaction


Past Psychological History: No Psychological Hx Reported


Smoking Status: Never smoker


Past Alcohol Use History: None Reported


Past Drug Use History: None Reported





- Past Family History


  ** Father


Family Medical History: No Reported History





  ** Mother


Family Medical History: GI Bleed





Medications and Allergies


                                Home Medications











 Medication  Instructions  Recorded  Confirmed  Type


 


Folic Acid 1 mg PO DAILY@1800 08/16/17 08/02/22 History


 


ALPRAZolam [Xanax] 0.5 mg PO BID PRN 08/09/20 08/02/22 History


 


Colloidal Oatmeal [Eucerin Eczema 1 applic TOPICAL BID 08/09/20 08/02/22 History





Relief]    


 


Cyanocobalamin [Vitamin B-12] 500 mcg PO DAILY@0800 08/09/20 08/02/22 History


 


Donepezil [Aricept] 10 mg PO DAILY@0800 08/09/20 08/02/22 History


 


Ipratropium-Albuterol Nebulize 3 ml INHALATION RT-QID PRN 08/09/20 08/02/22 

History





[Duoneb 0.5 mg-3 mg/3 ml Soln]    


 


Metoprolol Tartrate [Lopressor] 12.5 mg PO BID@0800,1800 08/09/20 08/02/22 

History


 


Pantoprazole [Protonix] 40 mg PO DAILY@0800 08/09/20 08/02/22 History


 


QUEtiapine [SEROquel] 50 mg PO HS@1800 08/09/20 08/02/22 History


 


Acetaminophen Tab [Tylenol] 650 mg PO Q4H PRN 08/02/22 08/02/22 History


 


Apixaban [Eliquis] 2.5 mg PO BID@0800,1800 08/02/22 08/02/22 History


 


Ascorbic Acid [Vitamin C] 500 mg PO BID@0800,1800 08/02/22 08/02/22 History


 


Cranberry Fruit Extract [Cranberry] 500 mg PO DAILY@0800 08/02/22 08/02/22 

History


 


Fluticasone/Vilanterol [Breo 1 puff INHALATION RT-DAILY@0800 08/02/22 08/02/22 

History





Ellipta 100-25 Mcg Inhaler]    


 


Ipratropium-Albuterol Nebulize 3 ml INHALATION RT-Q3H PRN 08/02/22 08/02/22 

History





[Duoneb 0.5 mg-3 mg/3 ml Soln]    


 


Kaolin Pectin 30 ml PO DAILY PRN 08/02/22 08/02/22 History


 


Mag Hydrox/Aluminum Hyd/Simeth 15 ml PO BID PRN 08/02/22 08/02/22 History





[Mylanta Maximum Strength Liq]    


 


Magnesium Hydroxide [Milk of 2,400 mg PO HS PRN 08/02/22 08/02/22 History





Magnesia]    


 


Mirtazapine [Remeron] 30 mg PO HS@1800 08/02/22 08/02/22 History


 


Pravastatin Sodium [Pravachol] 40 mg PO HS@1800 08/02/22 08/02/22 History


 


Zinc Sulfate [Orazinc] 220 mg PO DAILY@0800 08/02/22 08/02/22 History


 


guaiFENesin SYRUP 100MG/5ML 1 dose PO Q6H PRN 08/02/22 08/02/22 History





[Robitussin]    








                                    Allergies











Allergy/AdvReac Type Severity Reaction Status Date / Time


 


No Known Allergies Allergy   Verified 08/02/22 07:20














Physical Exam


Vitals: 


                                   Vital Signs











  Temp Pulse Pulse Resp BP BP Pulse Ox


 


 08/02/22 11:46   79   16  127/62   96


 


 08/02/22 07:45  98.1 F   82  16   138/63  96


 


 08/02/22 06:54  98.8 F  75   14  115/54   97


 


 08/02/22 04:02  99.7 F H  75   16  110/83   96


 


 08/02/22 03:07  99.9 F H   86  20   129/87  96


 


 08/02/22 01:26  99.9 F H  96   16  120/62   94 L








                                Intake and Output











 08/01/22 08/02/22 08/02/22





 22:59 06:59 14:59


 


Other:   


 


  Weight  77.111 kg 











PHYSICAL EXAM:


VITAL SIGNS: As above


GENERAL: Lying on stretcher, no acute distress.  No shakiness under light 

blanket. Baseline confusion; alert to self, recognizes daughter.


HEENT: Atraumatic, normocephalic , pupils round and equal ,Conjunctivae normal.


NECK:  No JVD. No thyroid enlargement. No LNs


CARDIOVASCULAR:  S1, S2 regular.  Systolic murmur


RESPIRATION: Breath sounds diminished in the bases. No rhonchi or crackles. No 

bronchial breathing.


ABDOMEN:  Soft,  nontender, nondistended . No guarding. no masses palpable. No 

ascites, No hepatosplenomegaly.Bowel sounds heard.


Extremities: Bilateral Dupuytren's contractures, history of right hand surgery, 

No edema. no swelling.


PSYCHIATRY: Alert and oriented X1-2, pleasantly confused,baseline


NERVOUS SYSTEM: Limited exam. Cranial N 2-12 grossly normal.


Skin: Warm and dry, no rash 














Results


CBC & Chem 7: 


                                 08/02/22 01:51





                                 08/02/22 01:51


Labs: 


                  Abnormal Lab Results - Last 24 Hours (Table)











  08/02/22 08/02/22 08/02/22 Range/Units





  01:51 01:51 01:51 


 


WBC   17.4 H   (3.8-10.6)  k/uL


 


RBC   3.97 L   (4.30-5.90)  m/uL


 


Hgb   11.8 L   (13.0-17.5)  gm/dL


 


Hct   35.1 L   (39.0-53.0)  %


 


Neutrophils #   15.5 H   (1.3-7.7)  k/uL


 


Lymphocytes #   0.6 L   (1.0-4.8)  k/uL


 


PT    12.4 H  (9.0-12.0)  sec


 


INR    1.2 H  (<1.2)  


 


Sodium  134 L    (137-145)  mmol/L


 


Carbon Dioxide  21 L    (22-30)  mmol/L


 


BUN  22 H    (9-20)  mg/dL


 


Glucose  137 H    (74-99)  mg/dL


 


Magnesium  1.5 L    (1.6-2.3)  mg/dL


 


Albumin  3.0 L    (3.5-5.0)  g/dL


 


Urine Protein     (Negative)  


 


Urine Ketones     (Negative)  


 


Urine Blood     (Negative)  


 


Ur Leukocyte Esterase     (Negative)  


 


Urine RBC     (0-5)  /hpf


 


Urine WBC     (0-5)  /hpf


 


Urine WBC Clumps     (None)  /hpf


 


Urine Bacteria     (None)  /hpf














  08/02/22 Range/Units





  04:58 


 


WBC   (3.8-10.6)  k/uL


 


RBC   (4.30-5.90)  m/uL


 


Hgb   (13.0-17.5)  gm/dL


 


Hct   (39.0-53.0)  %


 


Neutrophils #   (1.3-7.7)  k/uL


 


Lymphocytes #   (1.0-4.8)  k/uL


 


PT   (9.0-12.0)  sec


 


INR   (<1.2)  


 


Sodium   (137-145)  mmol/L


 


Carbon Dioxide   (22-30)  mmol/L


 


BUN   (9-20)  mg/dL


 


Glucose   (74-99)  mg/dL


 


Magnesium   (1.6-2.3)  mg/dL


 


Albumin   (3.5-5.0)  g/dL


 


Urine Protein  2+ H  (Negative)  


 


Urine Ketones  1+ H  (Negative)  


 


Urine Blood  Small H  (Negative)  


 


Ur Leukocyte Esterase  Large H  (Negative)  


 


Urine RBC  7 H  (0-5)  /hpf


 


Urine WBC  >182 H  (0-5)  /hpf


 


Urine WBC Clumps  Few H  (None)  /hpf


 


Urine Bacteria  Few H  (None)  /hpf








                      Microbiology - Last 24 Hours (Table)











 08/02/22 04:58 Urine Culture - Preliminary





 Urine,Voided 














Assessment and Plan


Assessment: 


Sepsis secondary to acute UTI,  cultures pending in a patient with history of 

ESBL,  prostate cancer with radiation seeds , urinary retention secondary to 

urethral stricture lending to difficult catheter insertion/coude', history of 

cystoscopy with urethral dilations with , Urology.


Fall, Suspected fell out of bed hitting bedside table.  Head, cervical spine CT 

reported negative. X-rays of bilateral shoulder and humerus ordered.


Leukocytosis secondary to the above


Hypoalbuminemia


Chronic renal failure, stage II, secondary to chronic urinary retention


Chronic anemia secondary to the above


Hyponatremia, hypovolemic, mild


Hypomagnesemia


Chronic mild persistent bronchial asthma, currently stable


History of high right frontal meningioma , suspected CVA, no residuals.


History of rheumatoid arthritis, treated in the past, not on immunosuppressants


History of lumbar degenerative disc disease, spinal stenosis, L5 spondylosis, 

facet arthropathy, L3-4 laminectomy


Gait dysfunction, right foot drop, ambulates with walker


Chronic paroxysmal atrial fibrillation on anticoagulation, follows with 

cardiologist Dr. Bustillos.


Underlying dementia, baseline











Plan: Continue on current medication regime ,monitoring and symptomatic 

treatment. X-rays of bilateral shoulder and humerus ordered.Maintain IV fluid 

hydration, IV antibiotics of Rocephin, Zosyn with  urine and blood cultures 

finalizing.  Pro-calcitonin ordered/pending Bladder scan X1 now.  Flomax ordered

. Coud /sinclair catheter ordered in a patient who is a difficult Sinclair catheter 

placement with orders given to staff for urology if unable to place coude' with 

lidocaine gel.  Anticoagulated on Eliquis, GI prophylaxis with PPI.  Protein 

supplements ordered between meals. Magnesium supplementation ordered.  Close 

monitoring of renal function, electrolytes,WBC with repeat labs ordered for a.m.














The impression and plan of care has been dictated as directed.





:


I performed a history and examination of this patient,  discussed the same with 

the dictator.  I agree with the dictator's note ,documented as a scribe.  Any 

additional findings or plans will be noted.

## 2022-08-02 NOTE — XR
EXAMINATION TYPE: XR chest 1V

 

DATE OF EXAM: 8/2/2022

 

COMPARISON: 8/9/2020

 

HISTORY: Fall. Pain

 

TECHNIQUE:

 

FINDINGS: There is no heart failure nor confluent pneumonic infiltrate. Costophrenic angles are clear
. No pneumothorax. There are chest leads.

 

IMPRESSION: No active cardiopulmonary disease. No change.

## 2022-08-02 NOTE — XR
Bilateral humerus, bilateral shoulders

 

HISTORY: Trauma and pain

 

3 views of each shoulder, 2 views of each humerus submitted

 

Comparison to chest x-ray 2/20/2022

 

Bone mineralization is reduced. Arthropathy is noted in the elbows left greater than right. Suspect c
hanges of calcific tendinitis in the left shoulder greater than right. Acromioclavicular joints show 
arthropathy. Enthesophyte present at the insertion of the triceps tendon on the right. Hand is superi
mposed over the upper chest on the right at one of the shoulder views. Distal acromial spurs are note
d. Correlate for impingement. Alignment is maintained. Transscapular Y view of the right shows distor
tion.

 

IMPRESSION: No fracture or dislocation is evident.

## 2022-08-02 NOTE — P.GSCN
History of Present Illness


Consult date: 08/02/22


History of present illness: 





89-year-old gentleman with multiple medical problems as well as dementia was 

brought into the hospital because of a fall, fever and apparent urinary 

infection.  She has a known history of prostate cancer treated with radium seeds

according to the chart.  He has a history of urethral strictures treated by 

 the past.  The nursing staffs unable to pass a catheter in there so 100

mL the urine in his bladder.  Since the patient is unaware urinate, he is 

infected medical staff wishes the catheter be placed.  The patient can give me 

no history.  He is somewhat belligerent.  Apparently he has dementia.





Review of Systems


All systems: negative





- Constitutional


Denies fever, Denies weight loss





- EENT


Eyes: denies blurred vision


Ears, nose, mouth and throat: Denies dysphagia





- Cardiovascular


Denies chest pain, Denies shortness of breath





- Respiratory


Denies cough, Denies 7





- Gastrointestinal


Reports as per HPI





- Genitourinary


Denies dysuria, Denies hematuria





- Integumentary


Denies rash, Denies unusual bruising





- Neurological


Denies headaches, Denies syncope





- Hematologic/Lymphatic


Denies easy bleeding, Denies easy bruising





Past Medical History


Past Medical History: Atrial Fibrillation, Asthma, Cancer, CVA/TIA, GI Bleed, 

Prostate Disorder, Rheumatoid Arthritis (RA)


Additional Past Medical History / Comment(s): chronic back problems, Rt foot 

drop, Uses a walker, prostate cancer, radiation seeds; suspected cva 2017; 

covid; strictures


History of Any Multi-Drug Resistant Organisms: ESBL


Year Discovered:: 01/01/2020


MDRO Source:: URINE ESBL


Past Surgical History: Back Surgery


Additional Past Surgical History / Comment(s): RECENT PICC LINE, NOW REMOVED, 

EVA CATARACTS, R hand surgery; laminectomy


Past Anesthesia/Blood Transfusion Reactions: No Reported Reaction


Past Psychological History: No Psychological Hx Reported


Smoking Status: Never smoker


Past Alcohol Use History: None Reported


Past Drug Use History: None Reported





- Past Family History


  ** Father


Family Medical History: No Reported History





  ** Mother


Family Medical History: GI Bleed





Medications and Allergies


                                Home Medications











 Medication  Instructions  Recorded  Confirmed  Type


 


Folic Acid 1 mg PO DAILY@1800 08/16/17 08/02/22 History


 


ALPRAZolam [Xanax] 0.5 mg PO BID PRN 08/09/20 08/02/22 History


 


Colloidal Oatmeal [Eucerin Eczema 1 applic TOPICAL BID 08/09/20 08/02/22 History





Relief]    


 


Cyanocobalamin [Vitamin B-12] 500 mcg PO DAILY@0800 08/09/20 08/02/22 History


 


Donepezil [Aricept] 10 mg PO DAILY@0800 08/09/20 08/02/22 History


 


Ipratropium-Albuterol Nebulize 3 ml INHALATION RT-QID PRN 08/09/20 08/02/22 

History





[Duoneb 0.5 mg-3 mg/3 ml Soln]    


 


Metoprolol Tartrate [Lopressor] 12.5 mg PO BID@0800,1800 08/09/20 08/02/22 

History


 


Pantoprazole [Protonix] 40 mg PO DAILY@0800 08/09/20 08/02/22 History


 


QUEtiapine [SEROquel] 50 mg PO HS@1800 08/09/20 08/02/22 History


 


Acetaminophen Tab [Tylenol] 650 mg PO Q4H PRN 08/02/22 08/02/22 History


 


Apixaban [Eliquis] 2.5 mg PO BID@0800,1800 08/02/22 08/02/22 History


 


Ascorbic Acid [Vitamin C] 500 mg PO BID@0800,1800 08/02/22 08/02/22 History


 


Cranberry Fruit Extract [Cranberry] 500 mg PO DAILY@0800 08/02/22 08/02/22 

History


 


Fluticasone/Vilanterol [Breo 1 puff INHALATION RT-DAILY@0800 08/02/22 08/02/22 

History





Ellipta 100-25 Mcg Inhaler]    


 


Ipratropium-Albuterol Nebulize 3 ml INHALATION RT-Q3H PRN 08/02/22 08/02/22 

History





[Duoneb 0.5 mg-3 mg/3 ml Soln]    


 


Kaolin Pectin 30 ml PO DAILY PRN 08/02/22 08/02/22 History


 


Mag Hydrox/Aluminum Hyd/Simeth 15 ml PO BID PRN 08/02/22 08/02/22 History





[Mylanta Maximum Strength Liq]    


 


Magnesium Hydroxide [Milk of 2,400 mg PO HS PRN 08/02/22 08/02/22 History





Magnesia]    


 


Mirtazapine [Remeron] 30 mg PO HS@1800 08/02/22 08/02/22 History


 


Pravastatin Sodium [Pravachol] 40 mg PO HS@1800 08/02/22 08/02/22 History


 


Zinc Sulfate [Orazinc] 220 mg PO DAILY@0800 08/02/22 08/02/22 History


 


guaiFENesin SYRUP 100MG/5ML 1 dose PO Q6H PRN 08/02/22 08/02/22 History





[Robitussin]    








                                    Allergies











Allergy/AdvReac Type Severity Reaction Status Date / Time


 


No Known Allergies Allergy   Verified 08/02/22 07:20














Surgical - Exam


                                   Vital Signs











Temp Pulse Resp BP Pulse Ox


 


 99.9 F H  96   16   120/62   94 L


 


 08/02/22 01:26  08/02/22 01:26  08/02/22 01:26  08/02/22 01:26  08/02/22 01:26














- General


well nourished, moderate distress





- Eyes


PERRL





- ENT


no hearing loss





- Neck


trachea midline





- Respiratory


normal respiratory effort





- Abdomen


Abdomen: tender





- Genitourinary





unCircumcised phallus with purulence draining from the tip of the penis





- Neurologic


combative, memory loss





- Psychiatric





Disoriented


other (This)





Results





- Labs





                                 08/02/22 01:51





                                 08/02/22 01:51


                  Abnormal Lab Results - Last 24 Hours (Table)











  08/02/22 08/02/22 08/02/22 Range/Units





  01:51 01:51 01:51 


 


WBC   17.4 H   (3.8-10.6)  k/uL


 


RBC   3.97 L   (4.30-5.90)  m/uL


 


Hgb   11.8 L   (13.0-17.5)  gm/dL


 


Hct   35.1 L   (39.0-53.0)  %


 


Neutrophils #   15.5 H   (1.3-7.7)  k/uL


 


Lymphocytes #   0.6 L   (1.0-4.8)  k/uL


 


PT    12.4 H  (9.0-12.0)  sec


 


INR    1.2 H  (<1.2)  


 


Sodium  134 L    (137-145)  mmol/L


 


Carbon Dioxide  21 L    (22-30)  mmol/L


 


BUN  22 H    (9-20)  mg/dL


 


Glucose  137 H    (74-99)  mg/dL


 


Magnesium  1.5 L    (1.6-2.3)  mg/dL


 


Albumin  3.0 L    (3.5-5.0)  g/dL


 


Urine Protein     (Negative)  


 


Urine Ketones     (Negative)  


 


Urine Blood     (Negative)  


 


Ur Leukocyte Esterase     (Negative)  


 


Urine RBC     (0-5)  /hpf


 


Urine WBC     (0-5)  /hpf


 


Urine WBC Clumps     (None)  /hpf


 


Urine Bacteria     (None)  /hpf














  08/02/22 Range/Units





  04:58 


 


WBC   (3.8-10.6)  k/uL


 


RBC   (4.30-5.90)  m/uL


 


Hgb   (13.0-17.5)  gm/dL


 


Hct   (39.0-53.0)  %


 


Neutrophils #   (1.3-7.7)  k/uL


 


Lymphocytes #   (1.0-4.8)  k/uL


 


PT   (9.0-12.0)  sec


 


INR   (<1.2)  


 


Sodium   (137-145)  mmol/L


 


Carbon Dioxide   (22-30)  mmol/L


 


BUN   (9-20)  mg/dL


 


Glucose   (74-99)  mg/dL


 


Magnesium   (1.6-2.3)  mg/dL


 


Albumin   (3.5-5.0)  g/dL


 


Urine Protein  2+ H  (Negative)  


 


Urine Ketones  1+ H  (Negative)  


 


Urine Blood  Small H  (Negative)  


 


Ur Leukocyte Esterase  Large H  (Negative)  


 


Urine RBC  7 H  (0-5)  /hpf


 


Urine WBC  >182 H  (0-5)  /hpf


 


Urine WBC Clumps  Few H  (None)  /hpf


 


Urine Bacteria  Few H  (None)  /hpf








                      Microbiology - Last 24 Hours (Table)











 08/02/22 04:58 Urine Culture - Preliminary





 Urine,Voided 








                                 Diabetes panel











  08/02/22 Range/Units





  01:51 


 


Sodium  134 L  (137-145)  mmol/L


 


Potassium  4.4  (3.5-5.1)  mmol/L


 


Chloride  105  ()  mmol/L


 


Carbon Dioxide  21 L  (22-30)  mmol/L


 


BUN  22 H  (9-20)  mg/dL


 


Creatinine  0.82  (0.66-1.25)  mg/dL


 


Glucose  137 H  (74-99)  mg/dL


 


Calcium  8.4  (8.4-10.2)  mg/dL


 


AST  29  (17-59)  U/L


 


ALT  23  (4-49)  U/L


 


Alkaline Phosphatase  119  ()  U/L


 


Total Protein  6.5  (6.3-8.2)  g/dL


 


Albumin  3.0 L  (3.5-5.0)  g/dL








                                  Calcium panel











  08/02/22 Range/Units





  01:51 


 


Calcium  8.4  (8.4-10.2)  mg/dL


 


Phosphorus  3.0  (2.5-4.5)  mg/dL


 


Albumin  3.0 L  (3.5-5.0)  g/dL








                                 Pituitary panel











  08/02/22 Range/Units





  01:51 


 


Sodium  134 L  (137-145)  mmol/L


 


Potassium  4.4  (3.5-5.1)  mmol/L


 


Chloride  105  ()  mmol/L


 


Carbon Dioxide  21 L  (22-30)  mmol/L


 


BUN  22 H  (9-20)  mg/dL


 


Creatinine  0.82  (0.66-1.25)  mg/dL


 


Glucose  137 H  (74-99)  mg/dL


 


Calcium  8.4  (8.4-10.2)  mg/dL








                                  Adrenal panel











  08/02/22 Range/Units





  01:51 


 


Sodium  134 L  (137-145)  mmol/L


 


Potassium  4.4  (3.5-5.1)  mmol/L


 


Chloride  105  ()  mmol/L


 


Carbon Dioxide  21 L  (22-30)  mmol/L


 


BUN  22 H  (9-20)  mg/dL


 


Creatinine  0.82  (0.66-1.25)  mg/dL


 


Glucose  137 H  (74-99)  mg/dL


 


Calcium  8.4  (8.4-10.2)  mg/dL


 


Total Bilirubin  1.1  (0.2-1.3)  mg/dL


 


AST  29  (17-59)  U/L


 


ALT  23  (4-49)  U/L


 


Alkaline Phosphatase  119  ()  U/L


 


Total Protein  6.5  (6.3-8.2)  g/dL


 


Albumin  3.0 L  (3.5-5.0)  g/dL














Assessment and Plan


Assessment: 


Impression: Urinary tract infection with sepsis.  History urethral strictures.  

History of prostate cancer.  Multiple medical illnesses.





Recommendations: At the request of the medical staff I will place a catheter.

## 2022-08-03 NOTE — P.PN
Subjective


Progress Note Date: 08/03/22





The patient was admitted with a urine infection.  He is in urine retention due 

to urethral stricture disease.  He required dilation to put in a catheter.  His 

urine output is good.  A low-grade fever last night as expected.  The catheter 

should stay in at least for 5 days.





Objective





- Vital Signs


Vital signs: 


                                   Vital Signs











Temp  97.7 F   08/03/22 07:17


 


Pulse  91   08/03/22 07:17


 


Resp  17   08/03/22 07:17


 


BP  145/70   08/03/22 07:17


 


Pulse Ox  96   08/03/22 07:17


 


FiO2      








                                 Intake & Output











 08/02/22 08/03/22 08/03/22





 18:59 06:59 18:59


 


Intake Total  590 


 


Output Total  850 


 


Balance  -260 


 


Weight  77.111 kg 


 


Intake:   


 


  Oral  590 


 


Output:   


 


  Urine  850 


 


    Uretheral (Anderson)  500 


 


Other:   


 


  Voiding Method  Indwelling Catheter 














- Labs


CBC & Chem 7: 


                                 08/03/22 06:47





                                 08/03/22 06:47


Labs: 


                  Abnormal Lab Results - Last 24 Hours (Table)











  08/02/22 08/03/22 08/03/22 Range/Units





  01:51 06:47 06:47 


 


WBC   11.4 H   (3.8-10.6)  k/uL


 


RBC   3.92 L   (4.30-5.90)  m/uL


 


Hgb   11.2 L   (13.0-17.5)  gm/dL


 


Hct   36.3 L   (39.0-53.0)  %


 


MCHC   30.9 L   (31.0-37.0)  g/dL


 


Neutrophils #   9.8 H   (1.3-7.7)  k/uL


 


Lymphocytes #   0.7 L   (1.0-4.8)  k/uL


 


Chloride    111 H  ()  mmol/L


 


Carbon Dioxide    20 L  (22-30)  mmol/L


 


Glucose    109 H  (74-99)  mg/dL


 


Calcium    8.1 L  (8.4-10.2)  mg/dL


 


Total Protein    5.9 L  (6.3-8.2)  g/dL


 


Albumin    2.5 L  (3.5-5.0)  g/dL


 


Procalcitonin  0.26 H    (0.02-0.09)  ng/mL








                      Microbiology - Last 24 Hours (Table)











 08/02/22 01:51 Blood Culture - Preliminary





 Blood    No Growth after 24 hours


 


 08/02/22 01:35 Blood Culture - Preliminary





 Blood    No Growth after 24 hours


 


 08/02/22 04:58 Urine Culture - Preliminary





 Urine,Voided

## 2022-08-03 NOTE — P.CONS
History of Present Illness





- Reason for Consult


Consult date: 08/03/22


Sepsis/UTI


Requesting physician: Laura Watson





- Chief Complaint


Weakness x few days





- History of Present Illness


Patient is a 89-year-old  male with a past medical history significant 

for paroxysmal atrial fibrillation with arthritis history of prostate cancer s/p

radiation seed history of recurrent UTI history of urethral stricture requiring 

dilatation patient who is a resident of [facility] patient was brought into the 

ER however the patient was noticed to be on the floor at the bedside with 

suspected pulm out of the bed patient subsequently was brought into the ER on 

arrival to the ER the patient did have a low-grade fever of 99.9 F patient did 

have a urine retention requiring catheter placement by urology patient did have 

elevated white was 13.4 with a left shift creatinine has been normal liver 

enzymes are normal urine was positive cultures are currently pending blood 

cultures of which are currently pending the patient spiking fever infectious 

disease was consulted for further management patient did have a chest x-ray 

reported negative for acute cardiopulmonary disease patient is currently on 

Zosyn most information has been obtained from review of the chart and talking 

nursing staff and the patient himself was unable to provide reliable history








Review of Systems


Positive points has been mentioned in HPI complete review could not be obtained 

because of his underlying mental status








Past Medical History


Past Medical History: Atrial Fibrillation, Asthma, Cancer, CVA/TIA, GI Bleed, 

Prostate Disorder, Rheumatoid Arthritis (RA)


Additional Past Medical History / Comment(s): chronic back problems, Rt foot 

drop, Uses a walker, prostate cancer, radiation seeds; suspected cva 2017; 

covid; strictures


History of Any Multi-Drug Resistant Organisms: ESBL


Year Discovered:: 01/01/2020


MDRO Source:: URINE ESBL


Past Surgical History: Back Surgery


Additional Past Surgical History / Comment(s): RECENT PICC LINE, NOW REMOVED, B

[place] CATARACTS, R hand surgery; laminectomy


Past Anesthesia/Blood Transfusion Reactions: No Reported Reaction


Past Psychological History: No Psychological Hx Reported


Smoking Status: Never smoker


Past Alcohol Use History: None Reported


Past Drug Use History: None Reported





- Past Family History


  ** Father


Family Medical History: No Reported History





  ** Mother


Family Medical History: GI Bleed





Medications and Allergies


                                Home Medications











 Medication  Instructions  Recorded  Confirmed  Type


 


Folic Acid 1 mg PO DAILY@1800 08/16/17 08/02/22 History


 


ALPRAZolam [Xanax] 0.5 mg PO BID PRN 08/09/20 08/02/22 History


 


Colloidal Oatmeal [Eucerin Eczema 1 applic TOPICAL BID 08/09/20 08/02/22 History





Relief]    


 


Cyanocobalamin [Vitamin B-12] 500 mcg PO DAILY@0800 08/09/20 08/02/22 History


 


Donepezil [Aricept] 10 mg PO DAILY@0800 08/09/20 08/02/22 History


 


Ipratropium-Albuterol Nebulize 3 ml INHALATION RT-QID PRN 08/09/20 08/02/22 

History





[Duoneb 0.5 mg-3 mg/3 ml Soln]    


 


Metoprolol Tartrate [Lopressor] 12.5 mg PO BID@0800,1800 08/09/20 08/02/22 

History


 


Pantoprazole [Protonix] 40 mg PO DAILY@0800 08/09/20 08/02/22 History


 


QUEtiapine [SEROquel] 50 mg PO HS@1800 08/09/20 08/02/22 History


 


Acetaminophen Tab [Tylenol] 650 mg PO Q4H PRN 08/02/22 08/02/22 History


 


Apixaban [Eliquis] 2.5 mg PO BID@0800,1800 08/02/22 08/02/22 History


 


Ascorbic Acid [Vitamin C] 500 mg PO BID@0800,1800 08/02/22 08/02/22 History


 


Cranberry Fruit Extract [Cranberry] 500 mg PO DAILY@0800 08/02/22 08/02/22 

History


 


Fluticasone/Vilanterol [Breo 1 puff INHALATION RT-DAILY@0800 08/02/22 08/02/22 

History





Ellipta 100-25 Mcg Inhaler]    


 


Ipratropium-Albuterol Nebulize 3 ml INHALATION RT-Q3H PRN 08/02/22 08/02/22 

History





[Duoneb 0.5 mg-3 mg/3 ml Soln]    


 


Kaolin Pectin 30 ml PO DAILY PRN 08/02/22 08/02/22 History


 


Mag Hydrox/Aluminum Hyd/Simeth 15 ml PO BID PRN 08/02/22 08/02/22 History





[Mylanta Maximum Strength Liq]    


 


Magnesium Hydroxide [Milk of 2,400 mg PO HS PRN 08/02/22 08/02/22 History





Magnesia]    


 


Mirtazapine [Remeron] 30 mg PO HS@1800 08/02/22 08/02/22 History


 


Pravastatin Sodium [Pravachol] 40 mg PO HS@1800 08/02/22 08/02/22 History


 


Zinc Sulfate [Orazinc] 220 mg PO DAILY@0800 08/02/22 08/02/22 History


 


guaiFENesin SYRUP 100MG/5ML 1 dose PO Q6H PRN 08/02/22 08/02/22 History





[Robitussin]    








                                    Allergies











Allergy/AdvReac Type Severity Reaction Status Date / Time


 


No Known Allergies Allergy   Verified 08/02/22 07:20














Physical Exam


Vitals: 


                                   Vital Signs











  Temp Pulse Resp BP Pulse Ox


 


 08/03/22 19:06  101.2 F H  104 H  16  135/68  94 L


 


 08/03/22 13:22  99.2 F  84  17  123/69  97


 


 08/03/22 08:46   91  17  


 


 08/03/22 08:00  97.7 F  91  17  145/70  96


 


 08/03/22 07:17  97.7 F  91  17  145/70  96


 


 08/03/22 02:40  100.9 F H  108 H  18  135/57  97


 


 08/03/22 01:37      96


 


 08/02/22 20:00   81  17  


 


 08/02/22 19:50  98.3 F  81  17  120/61  95








                                Intake and Output











 08/03/22 08/03/22 08/03/22





 06:59 14:59 22:59


 


Intake Total 590  800


 


Output Total 350  950


 


Balance 240  -150


 


Intake:   


 


  Intake, IV Titration   800





  Amount   


 


    Piperacillin-Tazobactam 3   50





    .375 gm In Sodium   





    Chloride 0.9% 100 ml @ 25   





    mls/hr IVPB Q8HR SRIDHAR Rx#   





    :[id number]   


 


    Sodium Chloride 0.9% 1,   750





    000 ml @ 75 mls/hr IV .   





    [id number] SRIDHAR Rx#:[id number]   


 


  Oral 590  


 


Output:   


 


  Urine 350  950


 


    Uretheral (Anderson)   650


 


Other:   


 


  Voiding Method  Indwelling Catheter 











GENERAL DESCRIPTION: Elderly male lying in bed, no distress. No tachypnea or 

accessory muscle of respiration use.


HEENT: Shows Pallor , no scleral icterus. Oral mucous membrane is dry. No 

pharyngeal erythema or thrush


NECK: Trachea central, no thyromegaly.


LUNGS: Unlabored breathing.  Decreased breath sound at the base. No wheeze or 

crackle.


HEART: S1, S2, regular rate and rhythm. No loud murmur


ABDOMEN: Soft, no tenderness , guarding or rigidity, no organomegaly


EXTREMITIES: No edema of feet.


SKIN: No rash, no masses palpable.


NEUROLOGICAL: The patient is slightly lethargic and orientation could not be 

determined











Results


CBC & Chem 7: 


                                 08/06/22 12:18





                                 08/06/22 12:18


Labs: 


                  Abnormal Lab Results - Last 24 Hours (Table)











  08/02/22 08/03/22 08/03/22 Range/Units





  01:51 06:47 06:47 


 


WBC   11.4 H   (3.8-10.6)  k/uL


 


RBC   3.92 L   (4.30-5.90)  m/uL


 


Hgb   11.2 L   (13.0-17.5)  gm/dL


 


Hct   36.3 L   (39.0-53.0)  %


 


MCHC   30.9 L   (31.0-37.0)  g/dL


 


Neutrophils #   9.8 H   (1.3-7.7)  k/uL


 


Lymphocytes #   0.7 L   (1.0-4.8)  k/uL


 


Chloride    111 H  ()  mmol/L


 


Carbon Dioxide    20 L  (22-30)  mmol/L


 


Glucose    109 H  (74-99)  mg/dL


 


Calcium    8.1 L  (8.4-10.2)  mg/dL


 


Total Protein    5.9 L  (6.3-8.2)  g/dL


 


Albumin    2.5 L  (3.5-5.0)  g/dL


 


Procalcitonin  0.26 H    (0.02-0.09)  ng/mL








                      Microbiology - Last 24 Hours (Table)











 08/02/22 04:58 Urine Culture - Final





 Urine,Voided 


 


 08/02/22 01:51 Blood Culture - Preliminary





 Blood    No Growth after 24 hours


 


 08/02/22 01:35 Blood Culture - Preliminary





 Blood    No Growth after 24 hours














Assessment and Plan


(1) UTI (urinary tract infection)


Current Visit: Yes   Status: Acute   Code(s): N39.0 - URINARY TRACT INFECTION, 

SITE NOT SPECIFIED   SNOMED Code(s): 56302961


   


Plan: 


1patient presented to hospital with sepsis in this patient had fever elevated 

white count did have urinary retention requiring Anderson catheter placement 

positive high clinical suspicious for urinary tract infection in this patient 

spiking fever despite being on Zosyn with concern for possible ESBL pathogen as 

the patient previously did have ESBL E. coli UTI.


2- Blood and urine culture have been requested


3-discontinue Zosyn and start the patient on Invanz 1 g daily


4-gentle IV fluid


We will follow on clinical condition and cultures to further adjust medication 

if needed


Thank you for this consultation will follow this patient along with you





Time with Patient: Greater than 30

## 2022-08-03 NOTE — P.PN
Subjective


Progress Note Date: 08/03/22


H&P Date: 08/02/22


Chief Complaint: Fevers, fall


This is a 84-year-old  gentleman with known history of paroxysmal 

atrial fibrillation, follows with Dr. Bustillos cardiologist, asthma, rheumatoid 

arthritis, history of prostate cancer status post radiation seeds, recurrent 

UTIs secondary to urinary retention related to urethral stricture lending to dif

ficult catheter insertion/coude', history of cystoscopy with urethral dilations 

with , Urology, history of CVA with no residuals and multiple other 

medical issues sent to the ER from [facility].  Patient discovered on the 

floor at bedside with suspected fall out of bed, possbily hitting bedside table.

 At the time of incident patient reported to have difficulty lifiting left left 

shoulder, with pain, febrile, temp of 102 with shaking/chills/ near rigors 

reported.  Received  cold wash cloth to forehead along with Tylenol and 

transported to the ER via EMS.T-max 99.9 on admission, post tylenol prior to 

transfer from [facility]. Vague historian secondary to underlying dementia.  WBC 17.4, 

hemoglobin 11.8, platelets 345, neutrophils 15.5, INR 1.2, sodium 134, potassium

4.4, bicarb 21, BUN 22, creatinine 0.8 to, GFR 78, lactic acid 1.4, magnesium 

1.5 troponin negative 1, albumin 3.  UA reported large leukocytes, 2+ protein, 

plus ketones, small amount of blood, urine wbc's greater than 182, elevated 

urine RBCs, few bacteria, culture with micro-pending.  Blood cultures obtained. 

Chest x-ray limited exam, reporting no active cardiopulmonary disease. 

Head/cervical spine CT reported mild enlargement of ventricles, some cerebral 

cortical atrophy , no mass effect or midline shift , no sign of intracranial 

hemorrhage ,extra-axial calcified mass in the right parietal lobe convexity con

sistent with meningioma measuring 1.5 cm in diameter, small meningioma in the 

right side of the anterior cerebral falx, cervical spondylitic changes, no 

fracture, cerebral atrophy with no acute intracranial abnormality, brain 

unchanged compared to prior exam.  EKG reporting sinus rhythm with occasional 

PVC.Resting comfortably, mild diaphoresis, denies chest pain, palpitations or 

shortness of breath.  Denies nausea, vomiting or diarrhea.  Denies abdominal 

pain.  IV fluid hydration and IV antibiotics of Rocephin and Zosyn initiated.





08/03/2022 T-max 100.9, WBC decreased to 11.4.  Currently maintained on Zosyn.  

Urine and blood cultures, finalizing.  Last night required dilation of urethral 

strictures with placement of coud-tipped Anderson catheter per urology secondary 

to urinary retention.  Urine cloudy, significant sediment, repeat urine culture 

ordered.  Extremely fatigued this morning.





Objective





- Vital Signs


Vital signs: 


                                   Vital Signs











Temp  99.2 F   08/03/22 13:22


 


Pulse  84   08/03/22 13:22


 


Resp  17   08/03/22 13:22


 


BP  123/69   08/03/22 13:22


 


Pulse Ox  97   08/03/22 13:22


 


FiO2      








                                 Intake & Output











 08/02/22 08/03/22 08/03/22





 18:59 06:59 18:59


 


Intake Total  590 800


 


Output Total  850 950


 


Balance  -260 -150


 


Weight  77.111 kg 


 


Intake:   


 


  Intake, IV Titration   800





  Amount   


 


    Piperacillin-Tazobactam 3   50





    .375 gm In Sodium   





    Chloride 0.9% 100 ml @ 25   





    mls/hr IVPB Q8HR [facility] Rx#   





    :[id number]   


 


    Sodium Chloride 0.9% 1,   750





    000 ml @ 75 mls/hr IV .   





    [id number] [facility] Rx#:[id number]   


 


  Oral  590 


 


Output:   


 


  Urine  850 950


 


    Uretheral (Anderson)  500 650


 


Other:   


 


  Voiding Method  Indwelling Catheter Indwelling Catheter














- Exam


PHYSICAL EXAM:


VITAL SIGNS: As above


GENERAL: Sleepy, fatigued, alert and oriented 1


HEENT: Atraumatic, normocephalic , pupils round and equal ,Conjunctivae normal.


NECK:  No JVD. No thyroid enlargement. 


CARDIOVASCULAR:  S1, S2 regular.  Systolic murmur


RESPIRATION: Bilateral air entry with breath sounds diminished in the bases. 


ABDOMEN:  Soft,  nontender, nondistended . No guarding. no masses palpable.  

Positive Bowel sounds.


Extremities: Bilateral Dupuytren's contractures, history of right hand surgical 

repair, No edema. no swelling.


PSYCHIATRY: Alert and oriented X1, pleasantly confused,baseline


NERVOUS SYSTEM: Limited exam. Cranial N 2-12 grossly normal.


Skin: Warm and dry, no rash 














- Labs


CBC & Chem 7: 


                                 08/03/22 06:47





                                 08/03/22 06:47


Labs: 


                  Abnormal Lab Results - Last 24 Hours (Table)











  08/02/22 08/03/22 08/03/22 Range/Units





  01:51 06:47 06:47 


 


WBC   11.4 H   (3.8-10.6)  k/uL


 


RBC   3.92 L   (4.30-5.90)  m/uL


 


Hgb   11.2 L   (13.0-17.5)  gm/dL


 


Hct   36.3 L   (39.0-53.0)  %


 


MCHC   30.9 L   (31.0-37.0)  g/dL


 


Neutrophils #   9.8 H   (1.3-7.7)  k/uL


 


Lymphocytes #   0.7 L   (1.0-4.8)  k/uL


 


Chloride    111 H  ()  mmol/L


 


Carbon Dioxide    20 L  (22-30)  mmol/L


 


Glucose    109 H  (74-99)  mg/dL


 


Calcium    8.1 L  (8.4-10.2)  mg/dL


 


Total Protein    5.9 L  (6.3-8.2)  g/dL


 


Albumin    2.5 L  (3.5-5.0)  g/dL


 


Procalcitonin  0.26 H    (0.02-0.09)  ng/mL








                      Microbiology - Last 24 Hours (Table)











 08/02/22 04:58 Urine Culture - Final





 Urine,Voided 


 


 08/02/22 01:51 Blood Culture - Preliminary





 Blood    No Growth after 24 hours


 


 08/02/22 01:35 Blood Culture - Preliminary





 Blood    No Growth after 24 hours














Assessment and Plan


Assessment: 


Sepsis secondary to acute UTI,  cultures pending in a patient with history of 

ESBL,  prostate cancer with radiation seeds , urinary retention.  Elevated pro-

calcitonin


Urinary retention secondary to urethral stricture lending to difficult catheter 

insertion/coude', history of cystoscopy with urethral dilations with , 

Urology.  Status post dilation and placement of 12-Kazakh coud tip catheter per

urology. 


Fall, Suspected fell out of bed hitting bedside table.  Head, cervical spine CT 

reported negative. X-rays of bilateral shoulder and humerus noted.


Leukocytosis secondary to the above, improving


Mild hyperchloremia


Hypoalbuminemia


Chronic renal failure, stage II, secondary to chronic urinary retention


Chronic anemia secondary to the above


Hyponatremia, hypovolemic, mild, improved


Hypomagnesemia


Chronic mild persistent bronchial asthma, currently stable


History of high right frontal meningioma , suspected CVA, no residuals.


History of rheumatoid arthritis, treated in the past, not on immunosuppressants


History of lumbar degenerative disc disease, spinal stenosis, L5 spondylosis, 

facet arthropathy, L3-4 laminectomy


Gait dysfunction, right foot drop, ambulates with walker


Chronic paroxysmal atrial fibrillation on anticoagulation, follows with 

cardiologist Dr. Bustillos.


Underlying dementia, baseline











Plan: Continue on current medication regime ,monitoring and symptomatic 

treatment. IV fluid hydration adjusted. Reoccurring fevers, infectious disease 

consulted. IV antibiotics. urine and blood cultures finalizing.  Repeat urine 

culture ordered.  Close monitoring of renal function, electrolytes,WBC with 

repeat labs ordered for a.m. PT/OT consulted.














The impression and plan of care has been dictated as directed.





:


I performed a history and examination of this patient,  discussed the same with 

the dictator.  I agree with the dictator's note ,documented as a scribe.  Any 

additional findings or plans will be noted.

## 2022-08-04 NOTE — P.PN
Subjective


Progress Note Date: 08/04/22


H&P Date: 08/02/22


Chief Complaint: Fevers, fall


This is a 84-year-old  gentleman with known history of paroxysmal 

atrial fibrillation, follows with Dr. Bustillos cardiologist, asthma, rheumatoid 

arthritis, history of prostate cancer status post radiation seeds, recurrent 

UTIs secondary to urinary retention related to urethral stricture lending to dif

ficult catheter insertion/coude', history of cystoscopy with urethral dilations 

with , Urology, history of CVA with no residuals and multiple other 

medical issues sent to the ER from [facility].  Patient discovered on the 

floor at bedside with suspected fall out of bed, possbily hitting bedside table.

 At the time of incident patient reported to have difficulty lifiting left left 

shoulder, with pain, febrile, temp of 102 with shaking/chills/ near rigors 

reported.  Received  cold wash cloth to forehead along with Tylenol and 

transported to the ER via EMS.T-max 99.9 on admission, post tylenol prior to 

transfer from [facility]. Vague historian secondary to underlying dementia.  WBC 17.4, 

hemoglobin 11.8, platelets 345, neutrophils 15.5, INR 1.2, sodium 134, potassium

4.4, bicarb 21, BUN 22, creatinine 0.8 to, GFR 78, lactic acid 1.4, magnesium 

1.5 troponin negative 1, albumin 3.  UA reported large leukocytes, 2+ protein, 

plus ketones, small amount of blood, urine wbc's greater than 182, elevated 

urine RBCs, few bacteria, culture with micro-pending.  Blood cultures obtained. 

Chest x-ray limited exam, reporting no active cardiopulmonary disease. 

Head/cervical spine CT reported mild enlargement of ventricles, some cerebral 

cortical atrophy , no mass effect or midline shift , no sign of intracranial 

hemorrhage ,extra-axial calcified mass in the right parietal lobe convexity con

sistent with meningioma measuring 1.5 cm in diameter, small meningioma in the 

right side of the anterior cerebral falx, cervical spondylitic changes, no 

fracture, cerebral atrophy with no acute intracranial abnormality, brain 

unchanged compared to prior exam.  EKG reporting sinus rhythm with occasional 

PVC.Resting comfortably, mild diaphoresis, denies chest pain, palpitations or 

shortness of breath.  Denies nausea, vomiting or diarrhea.  Denies abdominal 

pain.  IV fluid hydration and IV antibiotics of Rocephin and Zosyn initiated.





08/03/2022 T-max 100.9, WBC decreased to 11.4.  Currently maintained on Zosyn.  

Urine and blood cultures, finalizing.  Last night required dilation of urethral 

strictures with placement of coud-tipped Anderson catheter per urology secondary 

to urinary retention.  Urine cloudy, significant sediment, repeat urine culture 

ordered.  Extremely fatigued this morning.








08/04/2022 continued spiking temperatures, infectious disease consulted.  T-max 

101.2. Blood and urine cultures redrawn/pending.  Antibiotics adjusted further 

to ertapenem.  Extreme fatigue/lethargy secondary to infection/fevers/sepsis.  

Minimal diet intake.  Maintaining O2 sats in the high 90s on room air.  Denies 

pain.





Objective





- Vital Signs


Vital signs: 


                                   Vital Signs











Temp  97.7 F   08/04/22 08:00


 


Pulse  85   08/04/22 12:03


 


Resp  18   08/04/22 08:31


 


BP  117/56   08/04/22 08:00


 


Pulse Ox  96   08/04/22 08:00


 


FiO2      








                                 Intake & Output











 08/03/22 08/04/22 08/04/22





 18:59 06:59 18:59


 


Intake Total 800  


 


Output Total 950 925 


 


Balance -150 -925 


 


Intake:   


 


  Intake, IV Titration 800  





  Amount   


 


    Piperacillin-Tazobactam 3 50  





    .375 gm In Sodium   





    Chloride 0.9% 100 ml @ 25   





    mls/hr IVPB Q8HR [facility] Rx#   





    :[id number]   


 


    Sodium Chloride 0.9% 1, 750  





    000 ml @ 75 mls/hr IV .   





    [id number] [facility] Rx#:[id number]   


 


Output:   


 


  Urine 950 925 


 


    Uretheral (Anderson) 650 350 


 


Other:   


 


  Voiding Method Indwelling Catheter Indwelling Catheter Indwelling Catheter














- Exam


PHYSICAL EXAM:


VITAL SIGNS: As above


GENERAL: Lethargic, fatigued, alert and oriented 1


HEENT: Atraumatic, normocephalic , pupils round and equal ,Conjunctivae normal.


NECK: Supple,   No JVD.


CARDIOVASCULAR:  S1, S2 regular.  Systolic murmur


RESPIRATION: Bilateral air entry with breath sounds diminished in the bases. 


ABDOMEN:  Soft,  nontender, nondistended . No guarding. no masses palpable.  

Positive Bowel sounds.


Extremities: Bilateral Dupuytren's contractures,( history of right hand surgical

repair), No edema. no swelling.


PSYCHIATRY: Alert and oriented X1, mildly increased confusion 


NERVOUS SYSTEM: Limited exam. Cranial N 2-12 grossly normal.


Skin: Warm and dry, no rash 








                                  Microbiology





08/02/22 01:35   Blood   Blood Culture - Preliminary


                            No Growth after 48 hours


08/02/22 01:51   Blood   Blood Culture - Preliminary


                            No Growth after 48 hours


08/03/22 16:02   Urine,Catheterized   Urine Culture - Preliminary


08/02/22 04:58   Urine,Voided   Urine Culture - Final











- Labs


CBC & Chem 7: 


                                 08/04/22 08:05





                                 08/04/22 08:05


Labs: 


                  Abnormal Lab Results - Last 24 Hours (Table)











  08/04/22 08/04/22 Range/Units





  08:05 08:05 


 


WBC  12.08 H   (4.50-10.00)  X 10*3/uL


 


RBC  3.55 L   (4.40-5.60)  X 10*6/uL


 


Hgb  10.2 L   (13.0-17.0)  g/dL


 


Hct  31.2 L   (39.6-50.0)  %


 


Immature Gran #  0.05 H   (0.00-0.04)  X 10*3/uL


 


Neutrophils #  9.56 H   (1.80-7.70)  X 10*3/uL


 


Monocytes #  1.32 H   (0.20-1.00)  X 10*3/uL


 


Anion Gap   9.90 L  (10.00-18.00)  mmol/L


 


BUN/Creatinine Ratio   29.17 H  (12.00-20.00)  Ratio


 


Calcium   8.1 L  (8.7-10.3)  mg/dL








                      Microbiology - Last 24 Hours (Table)











 08/02/22 01:35 Blood Culture - Preliminary





 Blood    No Growth after 48 hours


 


 08/02/22 01:51 Blood Culture - Preliminary





 Blood    No Growth after 48 hours


 


 08/03/22 16:02 Urine Culture - Preliminary





 Urine,Catheterized 


 


 08/02/22 04:58 Urine Culture - Final





 Urine,Voided 














Assessment and Plan


Assessment: 


Sepsis secondary to suspected acute UTI,  cultures pending in a patient with 

history of ESBL,  prostate cancer with radiation seeds , urinary retention.  

Elevated pro-calcitonin


Urinary retention secondary to urethral stricture lending to difficult catheter 

insertion/coude', history of cystoscopy with urethral dilations with , 

Urology.  Status post dilation and placement of 12-Lao coud tip catheter per

urology. 


Acute metabolic encephalopathy, multifactorial, all the above, environment, in a

patient with underlying dementia.


Fall, Suspected fell out of bed hitting bedside table.  Head, cervical spine CT 

reported negative. X-rays of bilateral shoulder and humerus noted.


Leukocytosis secondary to the above


Mild hyperchloremia


Hypoalbuminemia


Chronic renal failure, stage II, secondary to chronic urinary retention


Chronic anemia secondary to the above


Hyponatremia, hypovolemic, mild, improved


Hypomagnesemia


Chronic mild persistent bronchial asthma, currently stable


History of high right frontal meningioma , suspected CVA, no residuals.


History of rheumatoid arthritis, treated in the past, not on immunosuppressants


History of lumbar degenerative disc disease, spinal stenosis, L5 spondylosis, 

facet arthropathy, L3-4 laminectomy


Gait dysfunction, right foot drop, ambulates with walker


Chronic paroxysmal atrial fibrillation on anticoagulation, follows with 

cardiologist Dr. Bustillos.


Underlying dementia











Plan: Continue on current medication regime ,monitoring and symptomatic 

treatment.  Gentle IV fluid hydration.  Repeat urine and blood cultures pending.

 Antibiotics as per infectious disease.Close monitoring of renal function, 

electrolytes,WBC with repeat labs ordered for a.m. 














The impression and plan of care has been dictated as directed.





:


I performed a history and examination of this patient,  discussed the same with 

the dictator.  I agree with the dictator's note ,documented as a scribe.  Any 

additional findings or plans will be noted.

## 2022-08-05 NOTE — P.PN
Subjective


Progress Note Date: 08/05/22


Principal diagnosis: 


Fever/UTI





Patient is 89 year old  male with multiple comorbidities including a 

prostate cancer status post radiation seed implant history of urethral stricture

requiring dilatation in history of recurrent UTI, brought into the hospital for 

low-grade fever weakness and urinary retention positive UA concerning for 

symptomatic urinary tract infection


 on today's evaluation that is 08/05/2022, the patient fever has resolved and is

afebrile this morning however the patient remained to be sleepy and lethargic 

and did not provide any history and no vomiting or diarrhea was reported by the 

nursing staff








Objective





- Vital Signs


Vital signs: 


                                   Vital Signs











Temp  99.2 F   08/05/22 14:00


 


Pulse  79   08/05/22 14:00


 


Resp  15   08/05/22 14:00


 


BP  127/59   08/05/22 14:00


 


Pulse Ox  99   08/05/22 14:00


 


FiO2      








                                 Intake & Output











 08/04/22 08/05/22 08/05/22





 18:59 06:59 18:59


 


Intake Total 700  


 


Output Total 500 600 100


 


Balance 200 -600 -100


 


Intake:   


 


  Intake, IV Titration 700  





  Amount   


 


    Sodium Chloride 0.9% 1, 700  





    000 ml @ 60 mls/hr IV .   





    [id number] [facility] Rx#:[id number]   


 


Output:   


 


  Urine 500 600 100


 


    Uretheral (Anderson)   100


 


Other:   


 


  Voiding Method Indwelling Catheter Indwelling Catheter Indwelling Catheter


 


  # Bowel Movements 0  














- Exam


GENERAL DESCRIPTION: An elderly male lying in bed in no distress





RESPIRATORY SYSTEM: Unlabored breathing , decreased breath sounds at bases





HEART: S1 S2 regular rate and rhythm ,





ABDOMEN: Soft , no tenderness





EXTREMITIES: No edema feet








- Labs


CBC & Chem 7: 


                                 08/05/22 07:30





                                 08/05/22 07:30


Labs: 


                  Abnormal Lab Results - Last 24 Hours (Table)











  08/03/22 08/05/22 08/05/22 Range/Units





  16:30 07:30 07:30 


 


RBC    3.45 L  (4.40-5.60)  X 10*6/uL


 


Hgb    9.9 L  (13.0-17.0)  g/dL


 


Hct    30.4 L  (39.6-50.0)  %


 


MPV    9.4 L  (9.5-12.2)  fL


 


Immature Gran #    0.05 H  (0.00-0.04)  X 10*3/uL


 


Monocytes #    1.19 H  (0.20-1.00)  X 10*3/uL


 


Anion Gap   9.00 L   (10.00-18.00)  mmol/L


 


Creatinine   0.5 L   (0.6-1.5)  mg/dL


 


BUN/Creatinine Ratio   29.96 H   (12.00-20.00)  Ratio


 


Calcium   8.0 L   (8.7-10.3)  mg/dL


 


Urine Protein  1+ H    (Negative)  


 


Urine Ketones  1+ H    (Negative)  


 


Urine Blood  Moderate H    (Negative)  


 


Ur Leukocyte Esterase  Large H    (Negative)  


 


Urine RBC  27 H    (0-5)  /hpf


 


Urine WBC  >182 H    (0-5)  /hpf


 


Urine WBC Clumps  Few H    (None)  /hpf


 


Urine Mucus  Rare H    (None)  /hpf








                      Microbiology - Last 24 Hours (Table)











 08/02/22 01:51 Blood Culture - Preliminary





 Blood    No Growth after 72 hours


 


 08/02/22 01:35 Blood Culture - Preliminary





 Blood    No Growth after 72 hours


 


 08/03/22 19:43 Blood Culture - Preliminary





 Blood    No Growth after 24 hours


 


 08/03/22 16:02 Urine Culture - Final





 Urine,Catheterized 














Assessment and Plan


(1) UTI (urinary tract infection)


Current Visit: Yes   Status: Acute   Code(s): N39.0 - URINARY TRACT INFECTION, 

SITE NOT SPECIFIED   SNOMED Code(s): 05809598


   


Plan: 


1patient presented to hospital with sepsis in this patient had fever elevated 

white count did have urinary retention requiring Anderson catheter placement 

positive high clinical suspicious for urinary tract infection in this patient 

spiking fever despite being on Zosyn with concern for possible ESBL pathogen as 

the patient previously did have ESBL E. coli UTI.


2- Blood and urine culture have been requested which are so far negative


3Patient seemed to have clinically responded with resolution of the fever and 

will  continue with Invanz 1 g daily and monitor clinical course closely


Time with Patient: Less than 30

## 2022-08-05 NOTE — P.PN
Subjective


Progress Note Date: 08/05/22


H&P Date: 08/02/22


Chief Complaint: Fevers, fall


This is a 84-year-old  gentleman with known history of paroxysmal 

atrial fibrillation, follows with Dr. Bustillos cardiologist, asthma, rheumatoid 

arthritis, history of prostate cancer status post radiation seeds, recurrent 

UTIs secondary to urinary retention related to urethral stricture lending to dif

ficult catheter insertion/coude', history of cystoscopy with urethral dilations 

with , Urology, history of CVA with no residuals and multiple other 

medical issues sent to the ER from [facility].  Patient discovered on the 

floor at bedside with suspected fall out of bed, possbily hitting bedside table.

 At the time of incident patient reported to have difficulty lifiting left left 

shoulder, with pain, febrile, temp of 102 with shaking/chills/ near rigors 

reported.  Received  cold wash cloth to forehead along with Tylenol and 

transported to the ER via EMS.T-max 99.9 on admission, post tylenol prior to 

transfer from [facility]. Vague historian secondary to underlying dementia.  WBC 17.4, 

hemoglobin 11.8, platelets 345, neutrophils 15.5, INR 1.2, sodium 134, potassium

4.4, bicarb 21, BUN 22, creatinine 0.8 to, GFR 78, lactic acid 1.4, magnesium 

1.5 troponin negative 1, albumin 3.  UA reported large leukocytes, 2+ protein, 

plus ketones, small amount of blood, urine wbc's greater than 182, elevated 

urine RBCs, few bacteria, culture with micro-pending.  Blood cultures obtained. 

Chest x-ray limited exam, reporting no active cardiopulmonary disease. 

Head/cervical spine CT reported mild enlargement of ventricles, some cerebral 

cortical atrophy , no mass effect or midline shift , no sign of intracranial 

hemorrhage ,extra-axial calcified mass in the right parietal lobe convexity con

sistent with meningioma measuring 1.5 cm in diameter, small meningioma in the 

right side of the anterior cerebral falx, cervical spondylitic changes, no 

fracture, cerebral atrophy with no acute intracranial abnormality, brain 

unchanged compared to prior exam.  EKG reporting sinus rhythm with occasional 

PVC.Resting comfortably, mild diaphoresis, denies chest pain, palpitations or 

shortness of breath.  Denies nausea, vomiting or diarrhea.  Denies abdominal 

pain.  IV fluid hydration and IV antibiotics of Rocephin and Zosyn initiated.





08/03/2022 T-max 100.9, WBC decreased to 11.4.  Currently maintained on Zosyn.  

Urine and blood cultures, finalizing.  Last night required dilation of urethral 

strictures with placement of coud-tipped Anderson catheter per urology secondary 

to urinary retention.  Urine cloudy, significant sediment, repeat urine culture 

ordered.  Extremely fatigued this morning.








08/04/2022 continued spiking temperatures, infectious disease consulted.  T-max 

101.2. Blood and urine cultures redrawn/pending.  Antibiotics adjusted further 

to ertapenem.  Extreme fatigue/lethargy secondary to infection/fevers/sepsis.  

Minimal diet intake.  Maintaining O2 sats in the high 90s on room air.  Denies 

pain.








08/05/2022 .  Diet intake improving, more so later in the day.  Fevers pe

rsistant on Zosyn ; currently maintained on ertapenem, with significant 

improvement in fevers , T-max 99.7, WBC normalized.  Urine clearer, now without 

sediment, less concentrated.Hemoglobin 9.9, platelets 332, BUN 15, creatinine 

0.5, magnesium 2.  Fatigued. Denies chest pain, palpitations or shortness of 

breath.  Maintaining O2 sats in the mid 90s on room air.





Objective





- Vital Signs


Vital signs: 


                                   Vital Signs











Temp  97.9 F   08/05/22 08:00


 


Pulse  76   08/05/22 09:08


 


Resp  16   08/05/22 08:00


 


BP  136/65   08/05/22 08:00


 


Pulse Ox  95   08/05/22 08:00


 


FiO2      








                                 Intake & Output











 08/04/22 08/05/22 08/05/22





 18:59 06:59 18:59


 


Intake Total 700  


 


Output Total 500 600 100


 


Balance 200 -600 -100


 


Intake:   


 


  Intake, IV Titration 700  





  Amount   


 


    Sodium Chloride 0.9% 1, 700  





    000 ml @ 60 mls/hr IV .   





    [id number] [facility] Rx#:[id number]   


 


Output:   


 


  Urine 500 600 100


 


    Uretheral (Anderson)   100


 


Other:   


 


  Voiding Method Indwelling Catheter Indwelling Catheter Indwelling Catheter


 


  # Bowel Movements 0  














- Exam


PHYSICAL EXAM:


VITAL SIGNS: As above


GENERAL: Fatigued, alert and oriented 1,[place]


HEENT: Atraumatic, normocephalic , pupils round and equal ,Conjunctivae normal.


NECK: Supple,   No JVD.


CARDIOVASCULAR:  S1, S2 regular.  Systolic murmur


RESPIRATION: Bilateral air entry with breath sounds diminished in the bases. 


ABDOMEN:  Soft,  nontender, nondistended . No guarding. no masses palpable.  

Positive Bowel sounds.


Extremities: Bilateral Dupuytren's contractures,( history of right hand surgical

repair), No edema. no swelling.


PSYCHIATRY: Alert and oriented X1, mildly increased confusion 


NERVOUS SYSTEM: Limited exam. Cranial N 2-12 grossly normal.


Skin: Warm and dry, no rash 














                                  Microbiology





08/02/22 01:51   Blood   Blood Culture - Preliminary


                            No Growth after 72 hours


08/02/22 01:35   Blood   Blood Culture - Preliminary


                            No Growth after 72 hours


08/03/22 19:43   Blood   Blood Culture - Preliminary


                            No Growth after 24 hours


08/03/22 16:02   Urine,Catheterized   Urine Culture - Final


08/02/22 04:58   Urine,Voided   Urine Culture - Final


                                        





- Labs


CBC & Chem 7: 


                                 08/05/22 07:30





                                 08/05/22 07:30


Labs: 


                  Abnormal Lab Results - Last 24 Hours (Table)











  08/03/22 08/05/22 08/05/22 Range/Units





  16:30 07:30 07:30 


 


RBC    3.45 L  (4.40-5.60)  X 10*6/uL


 


Hgb    9.9 L  (13.0-17.0)  g/dL


 


Hct    30.4 L  (39.6-50.0)  %


 


MPV    9.4 L  (9.5-12.2)  fL


 


Immature Gran #    0.05 H  (0.00-0.04)  X 10*3/uL


 


Monocytes #    1.19 H  (0.20-1.00)  X 10*3/uL


 


Anion Gap   9.00 L   (10.00-18.00)  mmol/L


 


Creatinine   0.5 L   (0.6-1.5)  mg/dL


 


BUN/Creatinine Ratio   29.96 H   (12.00-20.00)  Ratio


 


Calcium   8.0 L   (8.7-10.3)  mg/dL


 


Urine Protein  1+ H    (Negative)  


 


Urine Ketones  1+ H    (Negative)  


 


Urine Blood  Moderate H    (Negative)  


 


Ur Leukocyte Esterase  Large H    (Negative)  


 


Urine RBC  27 H    (0-5)  /hpf


 


Urine WBC  >182 H    (0-5)  /hpf


 


Urine WBC Clumps  Few H    (None)  /hpf


 


Urine Mucus  Rare H    (None)  /hpf








                      Microbiology - Last 24 Hours (Table)











 08/02/22 01:51 Blood Culture - Preliminary





 Blood    No Growth after 72 hours


 


 08/02/22 01:35 Blood Culture - Preliminary





 Blood    No Growth after 72 hours


 


 08/03/22 19:43 Blood Culture - Preliminary





 Blood    No Growth after 24 hours


 


 08/03/22 16:02 Urine Culture - Final





 Urine,Catheterized 














Assessment and Plan


Assessment: 


Sepsis secondary to suspected acute UTI,  cultures pending in a patient with 

history of ESBL,  prostate cancer with radiation seeds , urinary retention.  

Elevated pro-calcitonin


Urinary retention secondary to urethral stricture lending to difficult catheter 

insertion/coude', history of cystoscopy with urethral dilations with , 

Urology.  Status post dilation and placement of 12-Romanian coud tip catheter per

urology. 


Acute metabolic encephalopathy, multifactorial, all the above, environment, in a

patient with underlying dementia.


Fall, Suspected fell out of bed hitting bedside table.  Head, cervical spine CT 

reported negative. X-rays of bilateral shoulder and humerus noted.


Leukocytosis secondary to the above


Mild hyperchloremia


Hypoalbuminemia


Chronic renal failure, stage II, secondary to chronic urinary retention


Chronic anemia secondary to the above


Hyponatremia, hypovolemic, mild, improved


Hypomagnesemia


Chronic mild persistent bronchial asthma, currently stable


History of high right frontal meningioma , suspected CVA, no residuals.


History of rheumatoid arthritis, treated in the past, not on immunosuppressants


History of lumbar degenerative disc disease, spinal stenosis, L5 spondylosis, 

facet arthropathy, L3-4 laminectomy


Gait dysfunction, right foot drop, ambulates with walker


Chronic paroxysmal atrial fibrillation on anticoagulation, follows with 

cardiologist Dr. Bustillos.


Underlying dementia


[place]











Plan: Continue on current medication regime ,monitoring and symptomatic 

treatment. Gentle IV fluid hydration. Antibiotics as per infectious disease. 

fatigued, increase ambulation with PT, as tolerated.Close monitoring of renal 

function, electrolytes,WBC with repeat labs ordered for a.m. Patient will be 

managed/covered this weekend by [facility].














The impression and plan of care has been dictated as directed.





:


I performed a history and examination of this patient,  discussed the same with 

the dictator.  I agree with the dictator's note ,documented as a scribe.  Any 

additional findings or plans will be noted.

## 2022-08-05 NOTE — P.PN
Subjective


Progress Note Date: 08/04/22


Principal diagnosis: 


Fever/UTI





Patient is 89 year old  male with multiple comorbidities including a 

prostate cancer status post radiation seed implant history of urethral stricture

requiring dilatation in history of recurrent UTI, brought into the hospital for 

low-grade fever weakness and urinary retention positive UA concerning for 

symptomatic urinary tract infection


 on today's evaluation that is 08/04/2022, the patient overall fever pattern has

improved with last temperature of 101.2 last evening, the patient remained to be

sleepy and lethargic and unable to provide any history and no vomiting or 

diarrhea was reported by the nursing staff








Objective





- Vital Signs


Vital signs: 


                                   Vital Signs











Temp  97.5 F L  08/04/22 15:20


 


Pulse  80   08/04/22 15:50


 


Resp  15   08/04/22 15:20


 


BP  131/59   08/04/22 15:20


 


Pulse Ox  96   08/04/22 08:00


 


FiO2      








                                 Intake & Output











 08/03/22 08/04/22 08/04/22





 18:59 06:59 18:59


 


Intake Total 800  


 


Output Total 950 925 400


 


Balance -150 -925 -400


 


Intake:   


 


  Intake, IV Titration 800  





  Amount   


 


    Piperacillin-Tazobactam 3 50  





    .375 gm In Sodium   





    Chloride 0.9% 100 ml @ 25   





    mls/hr IVPB Q8HR [facility] Rx#   





    :[id number]   


 


    Sodium Chloride 0.9% 1, 750  





    000 ml @ 75 mls/hr IV .   





    [id number] [facility] Rx#:[id number]   


 


Output:   


 


  Urine 950 925 400


 


    Uretheral (Anderson) 650 350 


 


Other:   


 


  Voiding Method Indwelling Catheter Indwelling Catheter Indwelling Catheter














- Exam


GENERAL DESCRIPTION: An elderly male lying in bed in no distress





RESPIRATORY SYSTEM: Unlabored breathing , decreased breath sounds at bases





HEART: S1 S2 regular rate and rhythm ,





ABDOMEN: Soft , no tenderness





EXTREMITIES: No edema feet








- Labs


CBC & Chem 7: 


                                 08/05/22 07:30





                                 08/05/22 07:30


Labs: 


                  Abnormal Lab Results - Last 24 Hours (Table)











  08/04/22 08/04/22 Range/Units





  08:05 08:05 


 


WBC  12.08 H   (4.50-10.00)  X 10*3/uL


 


RBC  3.55 L   (4.40-5.60)  X 10*6/uL


 


Hgb  10.2 L   (13.0-17.0)  g/dL


 


Hct  31.2 L   (39.6-50.0)  %


 


Immature Gran #  0.05 H   (0.00-0.04)  X 10*3/uL


 


Neutrophils #  9.56 H   (1.80-7.70)  X 10*3/uL


 


Monocytes #  1.32 H   (0.20-1.00)  X 10*3/uL


 


Anion Gap   9.90 L  (10.00-18.00)  mmol/L


 


BUN/Creatinine Ratio   29.17 H  (12.00-20.00)  Ratio


 


Calcium   8.1 L  (8.7-10.3)  mg/dL








                      Microbiology - Last 24 Hours (Table)











 08/02/22 01:35 Blood Culture - Preliminary





 Blood    No Growth after 48 hours


 


 08/02/22 01:51 Blood Culture - Preliminary





 Blood    No Growth after 48 hours


 


 08/03/22 16:02 Urine Culture - Preliminary





 Urine,Catheterized 


 


 08/02/22 04:58 Urine Culture - Final





 Urine,Voided 














Assessment and Plan


(1) UTI (urinary tract infection)


Current Visit: Yes   Status: Acute   Code(s): N39.0 - URINARY TRACT INFECTION, 

SITE NOT SPECIFIED   SNOMED Code(s): 13790795


   


Plan: 


1patient presented to hospital with sepsis in this patient had fever elevated 

white count did have urinary retention requiring Anderson catheter placement 

positive high clinical suspicious for urinary tract infection in this patient 

spiking fever despite being on Zosyn with concern for possible ESBL pathogen as 

the patient previously did have ESBL E. coli UTI.


2- Blood and urine culture have been requested which are currently pending


3Patient to continue with Invanz 1 g daily while waiting for the cultures to 

finalize


 


Time with Patient: Less than 30

## 2022-08-06 NOTE — P.PN
Subjective


Progress Note Date: 08/06/22


Principal diagnosis: 


Fever/UTI





Patient is 89 year old  male with multiple comorbidities including a 

prostate cancer status post radiation seed implant history of urethral stricture

requiring dilatation in history of recurrent UTI, brought into the hospital for 

low-grade fever weakness and urinary retention positive UA concerning for 

symptomatic urinary tract infection


 on today's evaluation that is 08/06/2022, the patient remains to be afebrile, 

the patient remained to be sleepy and lethargic and did not provide any history,

the patient did went into A. fib with RVR and has been transferred to the 

telemetry unit, no vomiting or diarrhea has been reported








Objective





- Vital Signs


Vital signs: 


                                   Vital Signs











Temp  99.0 F   08/06/22 11:47


 


Pulse  116 H  08/06/22 11:50


 


Resp  18   08/06/22 11:47


 


BP  118/62   08/06/22 11:47


 


Pulse Ox  98   08/06/22 11:47


 


FiO2      








                                 Intake & Output











 08/05/22 08/06/22 08/06/22





 18:59 06:59 18:59


 


Intake Total   16.5


 


Output Total 400 1200 


 


Balance -400 -1200 16.5


 


Intake:   


 


  Intake, IV Titration   16.5





  Amount   


 


    Diltiazem 125 mg In   16.5





    Sodium Chloride 0.9% 100   





    ml @ Per Protocol IV .Q0M   





    Pending sale to [facility] Rx#:[id number]   


 


Output:   


 


  Urine 400 1200 


 


    Uretheral (Anderson) 400  


 


Other:   


 


  Voiding Method Indwelling Catheter Indwelling Catheter Indwelling Catheter


 


  # Voids 2  














- Exam


GENERAL DESCRIPTION: An elderly male lying in bed in no distress





RESPIRATORY SYSTEM: Unlabored breathing , decreased breath sounds at bases





HEART: S1 S2 regular rate and rhythm ,





ABDOMEN: Soft , no tenderness





EXTREMITIES: No edema feet








- Labs


CBC & Chem 7: 


                                 08/06/22 12:18





                                 08/06/22 12:18


Labs: 


                  Abnormal Lab Results - Last 24 Hours (Table)











  08/06/22 08/06/22 08/06/22 Range/Units





  07:23 07:23 12:18 


 


WBC  13.14 H   12.9 H  (4.50-10.00)  X 10*3/uL


 


RBC  3.82 L   3.56 L  (4.40-5.60)  X 10*6/uL


 


Hgb  11.0 L   11.4 L  (13.0-17.0)  g/dL


 


Hct  33.5 L   32.1 L  (39.6-50.0)  %


 


Immature Gran #  0.06 H    (0.00-0.04)  X 10*3/uL


 


Neutrophils #  10.72 H    (1.80-7.70)  X 10*3/uL


 


Monocytes #  1.25 H    (0.20-1.00)  X 10*3/uL


 


Sodium     (137-145)  mmol/L


 


Creatinine   0.5 L   (0.6-1.5)  mg/dL


 


BUN/Creatinine Ratio   29.20 H   (12.00-20.00)  Ratio


 


Glucose     (74-99)  mg/dL


 


Calcium   8.3 L   (8.7-10.3)  mg/dL














  08/06/22 Range/Units





  12:18 


 


WBC   (4.50-10.00)  X 10*3/uL


 


RBC   (4.40-5.60)  X 10*6/uL


 


Hgb   (13.0-17.0)  g/dL


 


Hct   (39.6-50.0)  %


 


Immature Gran #   (0.00-0.04)  X 10*3/uL


 


Neutrophils #   (1.80-7.70)  X 10*3/uL


 


Monocytes #   (0.20-1.00)  X 10*3/uL


 


Sodium  135 L  (137-145)  mmol/L


 


Creatinine  0.57 L  (0.6-1.5)  mg/dL


 


BUN/Creatinine Ratio   (12.00-20.00)  Ratio


 


Glucose  108 H  (74-99)  mg/dL


 


Calcium  8.2 L  (8.7-10.3)  mg/dL








                      Microbiology - Last 24 Hours (Table)











 08/02/22 01:35 Blood Culture - Preliminary





 Blood    No Growth after 96 hours


 


 08/02/22 01:51 Blood Culture - Preliminary





 Blood    No Growth after 96 hours


 


 08/03/22 19:43 Blood Culture - Preliminary





 Blood    No Growth after 48 hours














Assessment and Plan


(1) UTI (urinary tract infection)


Current Visit: Yes   Status: Acute   Code(s): N39.0 - URINARY TRACT INFECTION, 

SITE NOT SPECIFIED   SNOMED Code(s): 34817368


   


Plan: 


1patient presented to hospital with sepsis in this patient had fever elevated 

white count did have urinary retention requiring Anderson catheter placement 

positive high clinical suspicious for urinary tract infection in this patient 

spiking fever despite being on Zosyn with concern for possible ESBL pathogen as 

the patient previously did have ESBL E. coli UTI.


2- Blood and urine culture have been requested which are so far negative


3Patient did have resolution of the fever and currently being treated with 

Invanz 1 g daily and monitor clinical course closely


Time with Patient: Less than 30

## 2022-08-06 NOTE — US
EXAMINATION TYPE: US abdomen complete

 

DATE OF EXAM: 8/6/2022

 

COMPARISON:

 

CLINICAL HISTORY: fever. Poor historian.  

 

TECHNIQUE: Multiple sonographic images of the abdomen are obtained.

 

FINDINGS:

 

EXAM MEASUREMENTS:

 

Liver Length:  15.5 cm   

Gallbladder Wall:  0.2 cm   

Spleen:  11.8 cm   

Right Kidney:  10.6 x 4.6 x 5.6 cm 

Left Kidney:  11.5 x 5.0 x 5.3 cm   

 

SONOGRAPHER NOTES: ***Very limited do to bowel gas and patient unable to follow directions***

 

Pancreas:  Obscured by bowel gas

Liver:  Limited, scanned through ribs.  No prominent masses or lesions seen  

Gallbladder:  wnl, unable to obtain LLD images

**Evidence for sonographic Keenan's sign:  neg

CBD:  Obscured by overlying bowel gas 

Spleen:  wnl   

Right Kidney:  Limited visualization, no prominent masses or lesion seen   

Left Kidney:  Limited visualization, no prominent masses or lesion seen      

Upper IVC:  Obscured by overlying bowel gas  

Abd Aorta:  Proximal and mid obscured by bowel gas

 

 

 

 

IMPRESSION: 

No gallstones or dilated ducts. No focal liver defect. Common bile duct not visualized.

## 2022-08-06 NOTE — P.PN
Subjective


Progress Note Date: 08/06/22


Principal diagnosis: 





UTI sepsis





Patient was seen and examined.  Patient was noted to be in atrial fibrillation 

with RVR and transferred to cardiac unit.  Started on diltiazem drip, heart rate

in the 110s. Tmax 99.2F over the last 24H. Patient denies any complaints. Upset 

when woken up. 





Objective





- Vital Signs


Vital signs: 


                                   Vital Signs











Temp  98.4 F   08/06/22 08:48


 


Pulse  128 H  08/06/22 08:48


 


Resp  16   08/06/22 08:48


 


BP  125/56   08/06/22 08:48


 


Pulse Ox  97   08/06/22 08:48


 


FiO2      








                                 Intake & Output











 08/05/22 08/06/22 08/06/22





 18:59 06:59 18:59


 


Output Total 400 1200 


 


Balance -400 -1200 


 


Output:   


 


  Urine 400 1200 


 


    Uretheral (Anderson) 400  


 


Other:   


 


  Voiding Method Indwelling Catheter Indwelling Catheter Indwelling Catheter


 


  # Voids 2  














- Exam





General: [non toxic], [no distress], [appears at stated age]


Derm: [warm], [dry]


Head: [atraumatic], [normocephalic], [symmetric]


Eyes: [EOMI], [no lid lag], [anicteric sclera]


Mouth: [no lip lesion], [mucus membranes moist]


Cardiovascular: [Irregularly irregular HR 110s], [systolic murmur]


Lungs: [Decreased BS bilateral], [no rhonchi, no rales] , [no accessory muscle 

use]


Abdominal: [soft], [ nontender to palpation], [no guarding], [no appreciable 

organomegaly]


Ext: [no gross muscle atrophy], [no edema]


Neuro: [Uncooperative]


Psych: [AOx1] 





- Labs


CBC & Chem 7: 


                                 08/05/22 07:30





                                 08/05/22 07:30


Labs: 


                  Abnormal Lab Results - Last 24 Hours (Table)











  08/05/22 08/05/22 Range/Units





  07:30 07:30 


 


RBC   3.45 L  (4.40-5.60)  X 10*6/uL


 


Hgb   9.9 L  (13.0-17.0)  g/dL


 


Hct   30.4 L  (39.6-50.0)  %


 


MPV   9.4 L  (9.5-12.2)  fL


 


Immature Gran #   0.05 H  (0.00-0.04)  X 10*3/uL


 


Monocytes #   1.19 H  (0.20-1.00)  X 10*3/uL


 


Anion Gap  9.00 L   (10.00-18.00)  mmol/L


 


Creatinine  0.5 L   (0.6-1.5)  mg/dL


 


BUN/Creatinine Ratio  29.96 H   (12.00-20.00)  Ratio


 


Calcium  8.0 L   (8.7-10.3)  mg/dL








                      Microbiology - Last 24 Hours (Table)











 08/02/22 01:35 Blood Culture - Preliminary





 Blood    No Growth after 96 hours


 


 08/02/22 01:51 Blood Culture - Preliminary





 Blood    No Growth after 96 hours


 


 08/03/22 19:43 Blood Culture - Preliminary





 Blood    No Growth after 48 hours














Assessment and Plan


Assessment: 





Assessment and Plan





#Sepsis secondary to suspected acute UTI,  cultures pending in a patient with 

history of ESBL,  prostate cancer with radiation seeds , urinary retention.  

Elevated pro-calcitonin


#Urinary retention secondary to urethral stricture, Status post dilation and 

placement of 12-Japanese coud tip catheter per urology. 


#Acute metabolic encephalopathy, multifactorial, all the above, environment, in 

a patient with underlying dementia.


#Atrial fibrillation with RVR


#Fall, Suspected fell out of bed hitting bedside table.  Head, cervical spine CT

 reported negative. X-rays of bilateral shoulder and humerus noted.





Chronic conditions:


#Chronic kidney disease stage 2


#Normocytic anemia


#Chronic mild persistent bronchial asthma, currently stable


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


#History of rheumatoid arthritis, treated in the past, not on immunosuppressants


#History of lumbar degenerative disc disease, spinal stenosis, L5 spondylosis, 

facet arthropathy, L3-4 laminectomy


#Gait dysfunction, right foot drop, ambulates with walker


#Underlying dementia





No fever over the past 24H. Leukocytosis resolved. Blood cultures negative at 96

 hours. Urine culture negative at 18 hours. Continue normal saline at 60 cc/hr. 

Continue Ertapenem. Follow cultures. Telemetry monitoring. Infectious disease 

onboard.





s/p dilation of urethral strictures.  Urology on board.  Monitor strict intake 

and output.





Patient noted to have atrial fibrillation with RVR this morning.  Patient is 

moved to cardiac unit.  Continue Eliquis for anticoagulation.  Continue 

diltiazem drip. Discontinue scheduled DuoNeb treatments as this could be 

contributing to AFib with RVR and patient not in respiratory distress.  Maintain

 potassium greater than 4 and magnesium greater than 2, labs ordered.  Will 

order echocardiogram.  Cardiology consulted for further management of this 

patient.





Fall precautions.  PT and OT consulted.





Case discussed with nursing.

## 2022-08-07 NOTE — P.PN
Subjective


Progress Note Date: 08/07/22


Principal diagnosis: 





UTI sepsis





Patient was seen and examined.  Started on diltiazem drip yesterday for atrial 

fibrillation with RVR.  This was discontinued for amiodarone IV.  Heart rate in 

the 70s, regular.  Amiodarone IV discontinued and patient started on amiodarone 

by mouth 400 mg twice a day.  T-max of 99 Fahrenheit over the last 24 hours.  

Currently on ertapenem for UTI sepsis.





Objective





- Vital Signs


Vital signs: 


                                   Vital Signs











Temp  97.9 F   08/07/22 07:53


 


Pulse  80   08/07/22 08:03


 


Resp  18   08/07/22 07:53


 


BP  141/84   08/07/22 07:53


 


Pulse Ox  97   08/07/22 07:53


 


FiO2      








                                 Intake & Output











 08/06/22 08/07/22 08/07/22





 18:59 06:59 18:59


 


Intake Total 16.5 1510.004 5


 


Output Total 700 350 


 


Balance -683.5 1160.004 5


 


Intake:   


 


  IV   5


 


    Invasive Line 2   5


 


  Intake, IV Titration 16.5 970.004 





  Amount   


 


    Amiodarone 450 mg In  200.004 





    Dextrose 5% in Water 250   





    ml @ 0.5 MG/MIN 16.667   





    mls/hr IV .Q15H [facility] Rx#:   





    [id number]   


 


    Diltiazem 125 mg In 16.5  





    Sodium Chloride 0.9% 100   





    ml @ Per Protocol IV .Q0M   





    [facility] Rx#:[id number]   


 


    Ertapenem 1 gm In Sodium  50 





    Chloride 0.9% 50 ml @ 100   





    mls/hr IVPB DAILY@2000   





    [facility] Rx#:[id number]   


 


    Sodium Chloride 0.9% 1,  720 





    000 ml @ 60 mls/hr IV .   





    [id number] [facility] Rx#:[id number]   


 


  Oral 0 540 


 


Output:   


 


  Urine 700 350 


 


    Uretheral (Anderson)  100 


 


Other:   


 


  Voiding Method Indwelling Catheter Indwelling Catheter Indwelling Catheter














- Exam





General: [non toxic], [no distress], [appears at stated age]


Derm: [warm], [dry]


Head: [atraumatic], [normocephalic], [symmetric]


Eyes: [EOMI], [no lid lag], [anicteric sclera]


Mouth: [no lip lesion], [mucus membranes moist]


Cardiovascular: [S1 S2 heard], [systolic murmur]


Lungs: [Decreased BS bilateral], [no rhonchi, no rales] , [no accessory muscle 

use]


Abdominal: [soft], [ nontender to palpation], [no guarding], [no appreciable 

organomegaly]


Ext: [no gross muscle atrophy], [no edema]


Neuro: [Uncooperative]


Psych: [AOx1] 





- Labs


CBC & Chem 7: 


                                 08/06/22 12:18





                                 08/06/22 12:18


Labs: 


                  Abnormal Lab Results - Last 24 Hours (Table)











  08/06/22 08/06/22 08/06/22 Range/Units





  07:23 07:23 12:18 


 


WBC  13.14 H   12.9 H  (4.50-10.00)  X 10*3/uL


 


RBC  3.82 L   3.56 L  (4.40-5.60)  X 10*6/uL


 


Hgb  11.0 L   11.4 L  (13.0-17.0)  g/dL


 


Hct  33.5 L   32.1 L  (39.6-50.0)  %


 


Immature Gran #  0.06 H    (0.00-0.04)  X 10*3/uL


 


Neutrophils #  10.72 H    (1.80-7.70)  X 10*3/uL


 


Monocytes #  1.25 H    (0.20-1.00)  X 10*3/uL


 


Sodium     (137-145)  mmol/L


 


Creatinine   0.5 L   (0.6-1.5)  mg/dL


 


BUN/Creatinine Ratio   29.20 H   (12.00-20.00)  Ratio


 


Glucose     (74-99)  mg/dL


 


POC Glucose (mg/dL)     ()  mg/dL


 


Calcium   8.3 L   (8.7-10.3)  mg/dL














  08/06/22 08/07/22 Range/Units





  12:18 06:53 


 


WBC    (4.50-10.00)  X 10*3/uL


 


RBC    (4.40-5.60)  X 10*6/uL


 


Hgb    (13.0-17.0)  g/dL


 


Hct    (39.6-50.0)  %


 


Immature Gran #    (0.00-0.04)  X 10*3/uL


 


Neutrophils #    (1.80-7.70)  X 10*3/uL


 


Monocytes #    (0.20-1.00)  X 10*3/uL


 


Sodium  135 L   (137-145)  mmol/L


 


Creatinine  0.57 L   (0.6-1.5)  mg/dL


 


BUN/Creatinine Ratio    (12.00-20.00)  Ratio


 


Glucose  108 H   (74-99)  mg/dL


 


POC Glucose (mg/dL)   121 H  ()  mg/dL


 


Calcium  8.2 L   (8.7-10.3)  mg/dL








                      Microbiology - Last 24 Hours (Table)











 08/02/22 01:35 Blood Culture - Preliminary





 Blood    No Growth after 120 hours


 


 08/02/22 01:51 Blood Culture - Preliminary





 Blood    No Growth after 120 hours


 


 08/03/22 19:43 Blood Culture - Preliminary





 Blood    No Growth after 72 hours














Assessment and Plan


Assessment: 





Assessment and Plan





#Sepsis secondary to suspected acute UTI,  cultures pending in a patient with 

history of ESBL,  prostate cancer with radiation seeds , urinary retention.  

Elevated pro-calcitonin


#Urinary retention secondary to urethral stricture, Status post dilation and 

placement of 12-Qatari coud tip catheter per urology. 


#Acute metabolic encephalopathy, multifactorial, all the above, environment, in 

a patient with underlying dementia.


#Atrial fibrillation with RVR


#Fall, Suspected fell out of bed hitting bedside table.  Head, cervical spine CT

 reported negative. X-rays of bilateral shoulder and humerus noted.





Chronic conditions:


#Chronic kidney disease stage 2


#Normocytic anemia


#Chronic mild persistent bronchial asthma, currently stable


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


#History of rheumatoid arthritis, treated in the past, not on immunosuppressants


#History of lumbar degenerative disc disease, spinal stenosis, L5 spondylosis, 

facet arthropathy, L3-4 laminectomy


#Gait dysfunction, right foot drop, ambulates with walker


#Underlying dementia





No fever over the past 24H. Leukocytosis resolved. Blood cultures negative at 

120 hours. Urine culture negative. Continue normal saline at 60 cc/hr. Continue 

Ertapenem. Follow cultures. Telemetry monitoring. Infectious disease onboard.





s/p dilation of urethral strictures.  Urology on board.  Monitor strict intake 

and output.





Continue Eliquis for anticoagulation.  Patient started on amiodarone by mouth 

today.  Heart rate in the 70s.  Discontinue scheduled DuoNeb treatments as this 

could be contributing to AFib with RVR and patient not in respiratory distress. 

 Maintain potassium greater than 4 and magnesium greater than 2, labs ordered.  

Will order echocardiogram.  Cardiology consulted for further management of this 

patient.





Fall precautions.  PT and OT consulted.





Repeat CBC and BMP tomorrow morning.

## 2022-08-07 NOTE — P.PN
Subjective


Progress Note Date: 08/07/22


Principal diagnosis: 





Paroxysmal atrial fibrillation





This is an 89-year-old gentleman with underlying dementia as well as paroxysmal 

atrial fibrillation maintaining on oral anticoagulation as an outpatient was 

admitted to the hospital initially with urosepsis and dehydration.  Initially he

was admitted to the fourth floor but subsequently he went into A. fib with RVR 

and then he was transferred to the third floor.





He was seen yesterday.  He was started on amiodarone IV.  This morning when he 

was evaluated and he is back in normal sinus mechanism.  I am going to DC the 

new IV and start the patient on an U by mouth.  Continue beta blocker with the 

current dose.  Continue oral anticoagulation.  I believe that the A. fib with 

RVR was triggered by dehydration as well as urosepsis.  From the cardiac 

standpoint of view, the patient can be transferred to the fourth floor.  We will

follow-up with her home when necessary





Objective





- Vital Signs


Vital signs: 


                                   Vital Signs











Temp  98.5 F   08/07/22 03:45


 


Pulse  77   08/07/22 03:45


 


Resp  16   08/07/22 03:45


 


BP  148/67   08/07/22 03:45


 


Pulse Ox  97   08/07/22 03:45


 


FiO2      








                                 Intake & Output











 08/06/22 08/06/22 08/07/22





 06:59 18:59 06:59


 


Intake Total  16.5 1510.004


 


Output Total 1200 700 350


 


Balance -1200 -683.5 1160.004


 


Intake:   


 


  Intake, IV Titration  16.5 970.004





  Amount   


 


    Amiodarone 450 mg In   200.004





    Dextrose 5% in Water 250   





    ml @ 0.5 MG/MIN 16.667   





    mls/hr IV .Q15H [facility] Rx#:   





    [id number]   


 


    Diltiazem 125 mg In  16.5 





    Sodium Chloride 0.9% 100   





    ml @ Per Protocol IV .Q0M   





    [facility] Rx#:[id number]   


 


    Ertapenem 1 gm In Sodium   50





    Chloride 0.9% 50 ml @ 100   





    mls/hr IVPB DAILY@2000   





    [facility] Rx#:[id number]   


 


    Sodium Chloride 0.9% 1,   720





    000 ml @ 60 mls/hr IV .   





    [id number] [facility] Rx#:[id number]   


 


  Oral  0 540


 


Output:   


 


  Urine 1200 700 350


 


    Uretheral (Anderson)   100


 


Other:   


 


  Voiding Method Indwelling Catheter Indwelling Catheter Indwelling Catheter














- Constitutional


General appearance: Present: no acute distress





- Respiratory


Respiratory: bilateral: diminished





- Cardiovascular


Rhythm: regular


Heart sounds: normal: S1, S2


Abnormal Heart Sounds: Present: systolic murmur





- Labs


CBC & Chem 7: 


                                 08/06/22 12:18





                                 08/06/22 12:18


Labs: 


                  Abnormal Lab Results - Last 24 Hours (Table)











  08/06/22 08/06/22 08/06/22 Range/Units





  07:23 07:23 12:18 


 


WBC  13.14 H   12.9 H  (4.50-10.00)  X 10*3/uL


 


RBC  3.82 L   3.56 L  (4.40-5.60)  X 10*6/uL


 


Hgb  11.0 L   11.4 L  (13.0-17.0)  g/dL


 


Hct  33.5 L   32.1 L  (39.6-50.0)  %


 


Immature Gran #  0.06 H    (0.00-0.04)  X 10*3/uL


 


Neutrophils #  10.72 H    (1.80-7.70)  X 10*3/uL


 


Monocytes #  1.25 H    (0.20-1.00)  X 10*3/uL


 


Sodium     (137-145)  mmol/L


 


Creatinine   0.5 L   (0.6-1.5)  mg/dL


 


BUN/Creatinine Ratio   29.20 H   (12.00-20.00)  Ratio


 


Glucose     (74-99)  mg/dL


 


Calcium   8.3 L   (8.7-10.3)  mg/dL














  08/06/22 Range/Units





  12:18 


 


WBC   (4.50-10.00)  X 10*3/uL


 


RBC   (4.40-5.60)  X 10*6/uL


 


Hgb   (13.0-17.0)  g/dL


 


Hct   (39.6-50.0)  %


 


Immature Gran #   (0.00-0.04)  X 10*3/uL


 


Neutrophils #   (1.80-7.70)  X 10*3/uL


 


Monocytes #   (0.20-1.00)  X 10*3/uL


 


Sodium  135 L  (137-145)  mmol/L


 


Creatinine  0.57 L  (0.6-1.5)  mg/dL


 


BUN/Creatinine Ratio   (12.00-20.00)  Ratio


 


Glucose  108 H  (74-99)  mg/dL


 


Calcium  8.2 L  (8.7-10.3)  mg/dL








                      Microbiology - Last 24 Hours (Table)











 08/02/22 01:35 Blood Culture - Preliminary





 Blood    No Growth after 120 hours


 


 08/02/22 01:51 Blood Culture - Preliminary





 Blood    No Growth after 120 hours


 


 08/03/22 19:43 Blood Culture - Preliminary





 Blood    No Growth after 72 hours














Assessment and Plan


Assessment: 





Assessment


#1 urosepsis


#2 possible component of dehydration


#3 atrial fibrillation with rapid ventricular response


#4 known history of paroxysmal atrial fibrillation


#5 preserved left ventricle systolic function





Plan


#1 DC amiodarone IV start the patient on amiodarone by mouth


#2 the patient can be transferred to the fourth floor


#3 continue oral anticoagulation


#4 follow-up with the patient on when necessary

## 2022-08-08 NOTE — P.PN
Subjective


Progress Note Date: 08/07/22


Principal diagnosis: 


Fever/UTI





Patient is 89 year old  male with multiple comorbidities including a 

prostate cancer status post radiation seed implant history of urethral stricture

requiring dilatation in history of recurrent UTI, brought into the hospital for 

low-grade fever weakness and urinary retention positive UA concerning for 

symptomatic urinary tract infection


 on today's evaluation that is 08/07/2022, the patient continues to be afebrile,

the patient slightly more awake today however did not provide any history, the 

patient is currently breathing comfortably on room air no vomiting or diarrhea 

was reported by the nursing staff








Objective





- Vital Signs


Vital signs: 


                                   Vital Signs











Temp  98.3 F   08/07/22 20:00


 


Pulse  103 H  08/07/22 20:00


 


Resp  30 H  08/07/22 20:00


 


BP  116/63   08/07/22 20:00


 


Pulse Ox  96   08/07/22 20:00


 


FiO2      








                                 Intake & Output











 08/07/22 08/07/22 08/08/22





 06:59 18:59 06:59


 


Intake Total 1510.004 345 


 


Output Total 350  


 


Balance 1160.004 345 


 


Intake:   


 


  IV  245 


 


    .9 @ 20  240 


 


    Invasive Line 2  5 


 


  Intake, IV Titration 970.004  





  Amount   


 


    Amiodarone 450 mg In 200.004  





    Dextrose 5% in Water 250   





    ml @ 0.5 MG/MIN 16.667   





    mls/hr IV .Q15H [facility] Rx#:   





    [id number]   


 


    Ertapenem 1 gm In Sodium 50  





    Chloride 0.9% 50 ml @ 100   





    mls/hr IVPB DAILY@2000   





    [facility] Rx#:[id number]   


 


    Sodium Chloride 0.9% 1, 720  





    000 ml @ 60 mls/hr IV .   





    [id number] [facility] Rx#:[id number]   


 


  Oral 540 100 


 


Output:   


 


  Urine 350  


 


    Uretheral (Anderson) 100  


 


Other:   


 


  Voiding Method Indwelling Catheter Indwelling Catheter 














- Exam


GENERAL DESCRIPTION: An elderly male lying in bed in no distress





RESPIRATORY SYSTEM: Unlabored breathing , decreased breath sounds at bases





HEART: S1 S2 regular rate and rhythm ,





ABDOMEN: Soft , no tenderness





EXTREMITIES: No edema feet








- Labs


CBC & Chem 7: 


                                 08/06/22 12:18





                                 08/06/22 12:18


Labs: 


                  Abnormal Lab Results - Last 24 Hours (Table)











  08/07/22 Range/Units





  06:53 


 


POC Glucose (mg/dL)  121 H  ()  mg/dL








                      Microbiology - Last 24 Hours (Table)











 08/02/22 01:35 Blood Culture - Preliminary





 Blood    No Growth after 120 hours


 


 08/02/22 01:51 Blood Culture - Preliminary





 Blood    No Growth after 120 hours


 


 08/03/22 19:43 Blood Culture - Preliminary





 Blood    No Growth after 72 hours














Assessment and Plan


(1) UTI (urinary tract infection)


Current Visit: Yes   Status: Acute   Code(s): N39.0 - URINARY TRACT INFECTION, 

SITE NOT SPECIFIED   SNOMED Code(s): 58326744


   


Plan: 


1patient presented to hospital with sepsis in this patient had fever elevated 

white count did have urinary retention requiring Anderson catheter placement pos

itive high clinical suspicious for urinary tract infection in this patient 

spiking fever despite being on Zosyn with concern for possible ESBL pathogen as 

the patient previously did have ESBL E. coli UTI.


2- Blood and urine culture have been requested which are so far negative, 

patient did have ultrasound of the abdomen that was negative for any acute 

abnormality


3Patient did have resolution of the fever and to continue with Invanz 1 g daily

however with the negative cultures for any resistant pathogen may consider a 

short course of oral Ceftin on discharge


Time with Patient: Less than 30

## 2022-08-08 NOTE — P.PN
Subjective


Progress Note Date: 08/08/22


Principal diagnosis: 


Fever/UTI





Patient is 89 year old  male with multiple comorbidities including a 

prostate cancer status post radiation seed implant history of urethral stricture

requiring dilatation in history of recurrent UTI, brought into the hospital for 

low-grade fever weakness and urinary retention positive UA concerning for 

symptomatic urinary tract infection


 on today's evaluation that is 08/08/2022, the patient remains to be afebrile, 

the patient is more awake today and did answer some simple questions and, he 

seemed to be breathing comfortably, no vomiting or diarrhea has been reported





Objective





- Vital Signs


Vital signs: 


                                   Vital Signs











Temp  97.9 F   08/08/22 11:55


 


Pulse  113 H  08/08/22 11:55


 


Resp  18   08/08/22 11:55


 


BP  123/66   08/08/22 11:55


 


Pulse Ox  97   08/08/22 11:55


 


FiO2      








                                 Intake & Output











 08/07/22 08/08/22 08/08/22





 18:59 06:59 18:59


 


Intake Total 345  118


 


Output Total  1000 400


 


Balance 345 -1000 -282


 


Intake:   


 


    


 


    .9 @ 20 240  


 


    Invasive Line 2 5  


 


  Oral 100  118


 


Output:   


 


  Urine  1000 400


 


    Uretheral (Anderson)  275 200


 


Other:   


 


  Voiding Method Indwelling Catheter Indwelling Catheter Indwelling Catheter














- Exam


GENERAL DESCRIPTION: An elderly male lying in bed in no distress





RESPIRATORY SYSTEM: Unlabored breathing , decreased breath sounds at bases





HEART: S1 S2 regular rate and rhythm ,





ABDOMEN: Soft , no tenderness





EXTREMITIES: No edema feet








- Labs


CBC & Chem 7: 


                                 08/08/22 07:15





                                 08/08/22 07:15


Labs: 


                  Abnormal Lab Results - Last 24 Hours (Table)











  08/08/22 08/08/22 Range/Units





  07:15 07:15 


 


WBC  17.5 H   (3.8-10.6)  k/uL


 


RBC  3.87 L   (4.30-5.90)  m/uL


 


Hgb  11.0 L   (13.0-17.5)  gm/dL


 


Hct  34.6 L   (39.0-53.0)  %


 


Plt Count  464 H   (150-450)  k/uL


 


Sodium   135 L  (137-145)  mmol/L


 


Creatinine   0.57 L  (0.66-1.25)  mg/dL


 


Calcium   7.9 L  (8.4-10.2)  mg/dL








                      Microbiology - Last 24 Hours (Table)











 08/02/22 01:51 Blood Culture - Final





 Blood    No Growth after 144 hours


 


 08/02/22 01:35 Blood Culture - Final





 Blood    No Growth after 144 hours


 


 08/03/22 19:43 Blood Culture - Preliminary





 Blood    No Growth after 96 hours














Assessment and Plan


(1) UTI (urinary tract infection)


Current Visit: Yes   Status: Acute   Code(s): N39.0 - URINARY TRACT INFECTION, 

SITE NOT SPECIFIED   SNOMED Code(s): 91883891


   


Plan: 


1patient presented to hospital with sepsis in this patient had fever elevated 

white count did have urinary retention requiring Anderson catheter placement posit

dung high clinical suspicious for urinary tract infection in this patient spiking

fever despite being on Zosyn with concern for possible ESBL pathogen as the 

patient previously did have ESBL E. coli UTI.


2- Blood and urine culture have been requested which are so far negative, 

patient did have ultrasound of the abdomen that was negative for any acute 

abnormality


3Patient did have resolution of the fever, however the white count is up to 

17,000 today, we will repeat his UA and cultures continue with Invanz and 

Diflucan and repeat a CBC tomorrow, if the white count is trending downwards 

tomorrow, plan to finish therapy with oral Ceftin and Diflucan discussed with 

the NP for  admitting team


Time with Patient: Less than 30

## 2022-08-08 NOTE — P.PN
Subjective


Progress Note Date: 08/08/22





HISTORY OF PRESENT ILLNESS: 





The patient is a pleasant 89-year-old gentleman with a past medical history 

significant for underlying dementia as well as permanent atrial fibrillation 

maintaining oral anticoagulation as well as multiple comorbid conditions who was

admitted to the hospital initially with urosepsis.  Initially the patient was 

admitted to the fourth floor.  He is known to have permanent atrial 

fibrillation.  Earlier today it was noticed that his heart rate was going up and

he was in atrial fibrillation with a heart rate around 120 beats per minutes.  

For that reason the patient was brought to the telemetry unit.  He is a poor 

historian and known to have underlying dementia.  No indication that he was 

experiencing any chest pain or chest discomfort or any shortness of breath.  As 

a matter of fact he seems to be euvolemic on examination.  He was started on Car

dizem IV.  He was also on beta blocker which has increased a earlier today.  He 

is also on oral anticoagulation.  I am going to DC the Cardizem and start the 

patient on amiodarone IV and subsequently switching to amiodarone by mouth and 

also try to increase the dose of beta blocker within the next 24-48 hours.  

Continue oral anticoagulation.  On examination also the patient seems to be 

somewhat dehydrated.  And that could be contributing to his atrial fibrillation 

with RVR in addition of course to the urosepsis.  The patient is known to our 

service before.  He was admitted in 2020 was A. fib with RVR and at that point 

he underwent an echo which revealed normal left ventricular systolic function 

was no significant valvular abnormalities.  His blood work and workup was 

reviewed.  His hemoglobin is stable.  His GFR is about 60.





8/7/2022


He was seen yesterday.  He was started on amiodarone IV.  This morning when he 

was evaluated and he is back in normal sinus mechanism.  I am going to DC the 

new IV and start the patient on an U by mouth.  Continue beta blocker with the 

current dose.  Continue oral anticoagulation.  I believe that the A. fib with 

RVR was triggered by dehydration as well as urosepsis.  From the cardiac 

standpoint of view, the patient can be transferred to the fourth floor.  We will

follow-up with her home when necessary





8/8/2022


Patient examined this morning at the bedside. Patient denies chest pain or 

pressure.  He denies shortness of breath.  Telemetry reveals atrial fibrillation

with a heart rate in the 120s.  However overnight, the patients heart rates have

been fairly well controlled.  The patient is currently on metoprolol 25 mg twice

a day.





PHYSICAL EXAM: 


VITAL SIGNS: Reviewed.


GENERAL: Well-developed in no acute distress. 


NECK: Supple. No JVD or thyromegaly


LUNGS: Respirations even and unlabored. Lungs essentially clear to auscultation 

bilaterally.


HEART: Irregular rate and rhythm.  S1 and S2 heard. Systolic murmur noted.


EXTREMITIES: Normal range of motion.  No clubbing or cyanosis.  Peripheral 

pulses intact.  No lower extremity edema





ASSESSMENT: 


UTI with sepsis


Urinary retention


Leukocytosis, worsening


Paroxysmal atrial fibrillation with mild RVR


Dementia





PLAN: 


Continue current cardiac medications


Continue telemetry monitoring


Continue with current dose of metoprolol. If patients heart rates remain 

elevated, may adjust metoprolol dosing


Further recommendations pending patient course








Nurse practitioner note has been reviewed by physician. Signing provider agrees 

with the documented findings, assessment, and plan of care. 








Objective





- Vital Signs


Vital signs: 


                                   Vital Signs











Temp  97.9 F   08/08/22 11:55


 


Pulse  113 H  08/08/22 11:55


 


Resp  18   08/08/22 11:55


 


BP  123/66   08/08/22 11:55


 


Pulse Ox  97   08/08/22 11:55


 


FiO2      








                                 Intake & Output











 08/07/22 08/08/22 08/08/22





 18:59 06:59 18:59


 


Intake Total 345  118


 


Output Total  1000 400


 


Balance 345 -1000 -282


 


Intake:   


 


    


 


    .9 @ 20 240  


 


    Invasive Line 2 5  


 


  Oral 100  118


 


Output:   


 


  Urine  1000 400


 


    Uretheral (Anderson)  275 200


 


Other:   


 


  Voiding Method Indwelling Catheter Indwelling Catheter Indwelling Catheter














- Labs


CBC & Chem 7: 


                                 08/08/22 07:15





                                 08/08/22 07:15


Labs: 


                  Abnormal Lab Results - Last 24 Hours (Table)











  08/08/22 08/08/22 Range/Units





  07:15 07:15 


 


WBC  17.5 H   (3.8-10.6)  k/uL


 


RBC  3.87 L   (4.30-5.90)  m/uL


 


Hgb  11.0 L   (13.0-17.5)  gm/dL


 


Hct  34.6 L   (39.0-53.0)  %


 


Plt Count  464 H   (150-450)  k/uL


 


Sodium   135 L  (137-145)  mmol/L


 


Creatinine   0.57 L  (0.66-1.25)  mg/dL


 


Calcium   7.9 L  (8.4-10.2)  mg/dL








                      Microbiology - Last 24 Hours (Table)











 08/02/22 01:51 Blood Culture - Final





 Blood    No Growth after 144 hours


 


 08/02/22 01:35 Blood Culture - Final





 Blood    No Growth after 144 hours


 


 08/03/22 19:43 Blood Culture - Preliminary





 Blood    No Growth after 96 hours

## 2022-08-08 NOTE — CA
Transthoracic Echo Report 

 Name: Betty Montejo 

 MRN:    [id number] 

 Age:    89     Gender:     M 

 

 :    1933 

 Exam Date:     2022 08:46 

 Exam Location: [facility] 

 Ht (in):     71     Wt (lb):     170 

 Ordering Physician:        Tammy Milner MD 

 Attending/Referring Phys: 

 Technician         [place] 

 Procedure CPT: 

 Indications:       afib rvr 

 

 Cardiac Hx: 

 Technical Quality:      Technically difficult study 

 Contrast 1:    Lumason                     Total Dose (mL):      1 

 Contrast 2:                                Total Dose (mL): 

 

 MEASUREMENTS  (Male / Female) Normal Values 

 

 M-MODE 

 Aortic Root Diameter MM           3.6 cm                 

 LA Systolic Diameter MM           2.3 cm                 

 LA Ao Ratio MM                    0.6                    

 AV Cusp Separation MM             1.7 cm                 

 

 DOPPLER 

 AV Peak Velocity                  48.4 cm/s              

 AV Peak Gradient                  0.9 mmHg               

 MV Area PHT                       2.8 cm???                

 MR Peak Velocity                  109.4 cm/s             

 MR Peak Gradient                  4.8 mmHg               

 Mitral E Point Velocity           98.4 cm/s              

 Mitral A Point Velocity           72.4 cm/s              

 Mitral E to A Ratio               1.4                    

 MV Deceleration Time              266.3 ms               

 TR Peak Velocity                  150.8 cm/s             

 TR Peak Gradient                  9.1 mmHg               

 Right Ventricular Systolic Press  14.1 mmHg              

 

 

 FINDINGS 

 Left Ventricle 

 Left ventricular ejection fraction is estimated at 50-55_%. Left ventricular  

 wall thickness normal. Left ventricular cavity size normal. 

 

 Right Ventricle 

 Right ventricle not well visualized. 

 

 Right Atrium 

 Right atrium not well visualized. 

 

 Left Atrium 

 Normal left atrial size. 

 

 Mitral Valve 

 Mitral valve not well visualized. Mild mitral regurgitation. 

 

 Aortic Valve 

 Aortic valve not well visualized. 

 

 Tricuspid Valve 

 Trace tricuspid regurgitation. 

 

 Pulmonic Valve 

 Pulmonic valve not well visualized. 

 

 Pericardium 

 No pericardial effusion. 

 

 Aorta 

 Aortic root and proximal ascending aorta not well visualized. 

 

 CONCLUSIONS 

 Technically suboptimal study LV function appears preserved mild mitral  

 regurgitation 

 Previewed by:  

 Dr. Tony Reaves MD 

 (Electronically Signed) 

 Final Date:      2022 11:35

## 2022-08-08 NOTE — P.PN
Subjective


Progress Note Date: 08/08/22


H&P Date: 08/02/22


Chief Complaint: Fevers, fall


This is a 84-year-old  gentleman with known history of paroxysmal 

atrial fibrillation, follows with Dr. Bustillos cardiologist, asthma, rheumatoid 

arthritis, history of prostate cancer status post radiation seeds, recurrent 

UTIs secondary to urinary retention related to urethral stricture lending to dif

ficult catheter insertion/coude', history of cystoscopy with urethral dilations 

with , Urology, history of CVA with no residuals and multiple other 

medical issues sent to the ER from [facility].  Patient discovered on the 

floor at bedside with suspected fall out of bed, possbily hitting bedside table.

 At the time of incident patient reported to have difficulty lifiting left left 

shoulder, with pain, febrile, temp of 102 with shaking/chills/ near rigors 

reported.  Received  cold wash cloth to forehead along with Tylenol and 

transported to the ER via EMS.T-max 99.9 on admission, post tylenol prior to 

transfer from [facility]. Vague historian secondary to underlying dementia.  WBC 17.4, 

hemoglobin 11.8, platelets 345, neutrophils 15.5, INR 1.2, sodium 134, potassium

4.4, bicarb 21, BUN 22, creatinine 0.8 to, GFR 78, lactic acid 1.4, magnesium 

1.5 troponin negative 1, albumin 3.  UA reported large leukocytes, 2+ protein, 

plus ketones, small amount of blood, urine wbc's greater than 182, elevated 

urine RBCs, few bacteria, culture with micro-pending.  Blood cultures obtained. 

Chest x-ray limited exam, reporting no active cardiopulmonary disease. 

Head/cervical spine CT reported mild enlargement of ventricles, some cerebral 

cortical atrophy , no mass effect or midline shift , no sign of intracranial 

hemorrhage ,extra-axial calcified mass in the right parietal lobe convexity con

sistent with meningioma measuring 1.5 cm in diameter, small meningioma in the 

right side of the anterior cerebral falx, cervical spondylitic changes, no 

fracture, cerebral atrophy with no acute intracranial abnormality, brain 

unchanged compared to prior exam.  EKG reporting sinus rhythm with occasional 

PVC.Resting comfortably, mild diaphoresis, denies chest pain, palpitations or 

shortness of breath.  Denies nausea, vomiting or diarrhea.  Denies abdominal 

pain.  IV fluid hydration and IV antibiotics of Rocephin and Zosyn initiated.





08/03/2022 T-max 100.9, WBC decreased to 11.4.  Currently maintained on Zosyn.  

Urine and blood cultures, finalizing.  Last night required dilation of urethral 

strictures with placement of coud-tipped Anderson catheter per urology secondary 

to urinary retention.  Urine cloudy, significant sediment, repeat urine culture 

ordered.  Extremely fatigued this morning.








08/04/2022 continued spiking temperatures, infectious disease consulted.  T-max 

101.2. Blood and urine cultures redrawn/pending.  Antibiotics adjusted further 

to ertapenem.  Extreme fatigue/lethargy secondary to infection/fevers/sepsis.  

Minimal diet intake.  Maintaining O2 sats in the high 90s on room air.  Denies 

pain.








08/05/2022 .  Diet intake improving, more so later in the day.  Fevers pe

rsistant on Zosyn ; currently maintained on ertapenem, with significant 

improvement in fevers , T-max 99.7, WBC normalized.  Urine clearer, now without 

sediment, less concentrated.Hemoglobin 9.9, platelets 332, BUN 15, creatinine 

0.5, magnesium 2.  Fatigued. Denies chest pain, palpitations or shortness of 

breath.  Maintaining O2 sats in the mid 90s on room air.








8/8/22   Over the weekend developed atrial fibrillation with RVR, converted on 

Cardizem drip.  Evaluated by cardiology.  Beta blocker increased.  Controlled A.

fib throughout the night, this morning heart rates fluctuating up into the 130s 

briefly.  T-max 99, WBC 17.5, diaphoretic.  Sediment cleared from urine.  Staff 

reports no signs or symptoms of aspiration.  No cough.  Lungs clear. Continues 

on ertapenem. Blood and urine cultures remain clear.  Renal function stable.  

Diet intake poor, consuming 25%.  Medically debilitated, has not been out of bed

yet, requires 2 person assist.





Objective





- Vital Signs


Vital signs: 


                                   Vital Signs











Temp  97.9 F   08/08/22 11:55


 


Pulse  113 H  08/08/22 11:55


 


Resp  18   08/08/22 11:55


 


BP  123/66   08/08/22 11:55


 


Pulse Ox  97   08/08/22 11:55


 


FiO2      








                                 Intake & Output











 08/07/22 08/08/22 08/08/22





 18:59 06:59 18:59


 


Intake Total 345  118


 


Output Total  1000 400


 


Balance 345 -1000 -282


 


Intake:   


 


    


 


    .9 @ 20 240  


 


    Invasive Line 2 5  


 


  Oral 100  118


 


Output:   


 


  Urine  1000 400


 


    Uretheral (Anderson)  275 200


 


Other:   


 


  Voiding Method Indwelling Catheter Indwelling Catheter Indwelling Catheter














- Exam


PHYSICAL EXAM:


VITAL SIGNS: As above


GENERAL: Sitting up in bed, alert and oriented 1,Jamul, fatigued


HEENT: Atraumatic, normocephalic , pupils round and equal ,Conjunctivae normal.


NECK: Supple,   No JVD.


CARDIOVASCULAR:  S1, S2 regular,irreg.  Systolic murmur


RESPIRATION: Bilateral air entry with breath sounds diminished in the bases. 


ABDOMEN:  Soft,  nontender, nondistended . No guarding. no masses palpable.  

Positive Bowel sounds.


Extremities: Bilateral Dupuytren's contractures,( history of right hand surgical

repair), No edema. no swelling.


PSYCHIATRY: Alert and oriented X1, increased confusion from baseline


NERVOUS SYSTEM: Limited exam. Cranial N 2-12 grossly normal.


Skin: Warm and dry, no rash 

















- Labs


CBC & Chem 7: 


                                 08/08/22 07:15





                                 08/08/22 07:15


Labs: 


                  Abnormal Lab Results - Last 24 Hours (Table)











  08/08/22 08/08/22 Range/Units





  07:15 07:15 


 


WBC  17.5 H   (3.8-10.6)  k/uL


 


RBC  3.87 L   (4.30-5.90)  m/uL


 


Hgb  11.0 L   (13.0-17.5)  gm/dL


 


Hct  34.6 L   (39.0-53.0)  %


 


Plt Count  464 H   (150-450)  k/uL


 


Sodium   135 L  (137-145)  mmol/L


 


Creatinine   0.57 L  (0.66-1.25)  mg/dL


 


Calcium   7.9 L  (8.4-10.2)  mg/dL








                      Microbiology - Last 24 Hours (Table)











 08/02/22 01:51 Blood Culture - Final





 Blood    No Growth after 144 hours


 


 08/02/22 01:35 Blood Culture - Final





 Blood    No Growth after 144 hours


 


 08/03/22 19:43 Blood Culture - Preliminary





 Blood    No Growth after 96 hours














Assessment and Plan


Assessment: 


Sepsis secondary to suspected acute UTI,  cultures pending in a patient with 

history of ESBL,  prostate cancer with radiation seeds , urinary retention.  

Elevated pro-calcitonin


Urinary retention secondary to urethral stricture lending to difficult catheter 

insertion/coude', history of cystoscopy with urethral dilations with , 

Urology.  Status post dilation and placement of 12[place] citizen coud tip catheter per

urology. 


Acute metabolic encephalopathy, multifactorial, all the above, environment, in a

patient with underlying dementia.


Acute A. fib with RVR, converted on Cardizem drip, in a patient with Chronic 

paroxysmal atrial fibrillation on anticoagulation, follows with cardiologist Dr. Bustillos.


Fall, Suspected rolled out of bed hitting bedside table.  Head, cervical spine 

CT reported negative. X-rays of bilateral shoulder and humerus noted.


Leukocytosis secondary to the above


Mild hyperchloremia


Hypoalbuminemia


Chronic renal failure, stage II, secondary to chronic urinary retention


Chronic anemia secondary to the above


Hyponatremia, hypovolemic, mild, improved


Hypomagnesemia


Chronic mild persistent bronchial asthma, currently stable


History of high right frontal meningioma , suspected CVA, no residuals.


History of rheumatoid arthritis, treated in the past, not on immunosuppressants


History of lumbar degenerative disc disease, spinal stenosis, L5 spondylosis, 

facet arthropathy, L3-4 laminectomy


Gait dysfunction, right foot drop, ambulates with walker


Underlying dementia


Jamul











Plan: Continue on current medication regime ,monitoring and symptomatic 

treatment.  Echo pending.  Continue on beta blocker dose, recently increased. 

Anderson discontinued, bladder scan/PVR & q 8h. Procalcitonin repeated to trend in

fection. Discussed with infectious disease, Diflucan added to med regimen, 

repeat urine and blood cultures.  Discharge planning in progress for return to 

[facility] with [facility], 

tomorrow with improvement in WBC.














The impression and plan of care has been dictated as directed.





:


I performed a history and examination of this patient,  discussed the same with 

the dictator.  I agree with the dictator's note ,documented as a scribe.  Any 

additional findings or plans will be noted.

## 2022-08-09 NOTE — P.PN
Subjective


Progress Note Date: 08/09/22





HISTORY OF PRESENT ILLNESS: 





The patient is a pleasant 89-year-old gentleman with a past medical history 

significant for underlying dementia as well as permanent atrial fibrillation 

maintaining oral anticoagulation as well as multiple comorbid conditions who was

admitted to the hospital initially with urosepsis.  Initially the patient was 

admitted to the fourth floor.  He is known to have permanent atrial 

fibrillation.  Earlier today it was noticed that his heart rate was going up and

he was in atrial fibrillation with a heart rate around 120 beats per minutes.  

For that reason the patient was brought to the telemetry unit.  He is a poor 

historian and known to have underlying dementia.  No indication that he was 

experiencing any chest pain or chest discomfort or any shortness of breath.  As 

a matter of fact he seems to be euvolemic on examination.  He was started on Car

dizem IV.  He was also on beta blocker which has increased a earlier today.  He 

is also on oral anticoagulation.  I am going to DC the Cardizem and start the 

patient on amiodarone IV and subsequently switching to amiodarone by mouth and 

also try to increase the dose of beta blocker within the next 24-48 hours.  

Continue oral anticoagulation.  On examination also the patient seems to be 

somewhat dehydrated.  And that could be contributing to his atrial fibrillation 

with RVR in addition of course to the urosepsis.  The patient is known to our 

service before.  He was admitted in 2020 was A. fib with RVR and at that point 

he underwent an echo which revealed normal left ventricular systolic function 

was no significant valvular abnormalities.  His blood work and workup was 

reviewed.  His hemoglobin is stable.  His GFR is about 60.





8/7/2022


He was seen yesterday.  He was started on amiodarone IV.  This morning when he 

was evaluated and he is back in normal sinus mechanism.  I am going to DC the 

new IV and start the patient on an U by mouth.  Continue beta blocker with the 

current dose.  Continue oral anticoagulation.  I believe that the A. fib with 

RVR was triggered by dehydration as well as urosepsis.  From the cardiac 

standpoint of view, the patient can be transferred to the fourth floor.  We will

follow-up with her home when necessary





8/8/2022


Patient examined this morning at the bedside. Patient denies chest pain or 

pressure.  He denies shortness of breath.  Telemetry reveals atrial fibrillation

with a heart rate in the 120s.  However overnight, the patients heart rates have

been fairly well controlled.  The patient is currently on metoprolol 25 mg twice

a day.





8/9/2022


Patient examined this morning at the bedside. Patient denies chest pain or 

pressure.  He denies shortness of breath.  Telemetry reveals atrial fibrillation

with a heart rate in the 115-130.





PHYSICAL EXAM: 


VITAL SIGNS: Reviewed.


GENERAL: Well-developed in no acute distress. 


NECK: Supple. No JVD or thyromegaly


LUNGS: Respirations even and unlabored. Lungs essentially clear to auscultation 

bilaterally.


HEART: Tachycardic.  Irregular rate and rhythm.  S1 and S2 heard. Systolic 

murmur noted.


EXTREMITIES: Normal range of motion.  No clubbing or cyanosis.  Peripheral 

pulses intact.  No lower extremity edema





ASSESSMENT: 


UTI with sepsis


Urinary retention


Leukocytosis, worsening


Paroxysmal atrial fibrillation with mild RVR


Dementia





PLAN: 


Continue current cardiac medications


Continue telemetry monitoring


Increase metoprolol to 25 mg 3 times a day.


Further recommendations pending patient course








Nurse practitioner note has been reviewed by physician. Signing provider agrees 

with the documented findings, assessment, and plan of care. 








Objective





- Vital Signs


Vital signs: 


                                   Vital Signs











Temp  97.8 F   08/09/22 08:27


 


Pulse  113 H  08/09/22 08:27


 


Resp  18   08/09/22 08:27


 


BP  113/71   08/09/22 08:27


 


Pulse Ox  97   08/09/22 08:27


 


FiO2      








                                 Intake & Output











 08/08/22 08/09/22 08/09/22





 18:59 06:59 18:59


 


Intake Total 236  


 


Output Total 700 400 


 


Balance -464 -400 


 


Intake:   


 


  Oral 236  


 


Output:   


 


  Urine 700 400 


 


    Uretheral (Anderson) 200  


 


Other:   


 


  Voiding Method Indwelling Catheter External Catheter External Catheter














- Labs


CBC & Chem 7: 


                                 08/09/22 08:16





                                 08/08/22 07:15


Labs: 


                  Abnormal Lab Results - Last 24 Hours (Table)











  08/08/22 08/09/22 Range/Units





  07:15 08:16 


 


WBC   13.7 H  (3.8-10.6)  k/uL


 


RBC   4.12 L  (4.30-5.90)  m/uL


 


Hgb   11.7 L  (13.0-17.5)  gm/dL


 


Hct   37.4 L  (39.0-53.0)  %


 


Plt Count   495 H  (150-450)  k/uL


 


Procalcitonin  0.24 H   (0.02-0.09)  ng/mL








                      Microbiology - Last 24 Hours (Table)











 08/08/22 12:05 Urine Culture - Preliminary





 Urine,Voided 


 


 08/03/22 19:43 Blood Culture - Preliminary





 Blood    No Growth after 120 hours

## 2022-08-09 NOTE — P.DS
Providers


Date of admission: 


08/02/22 05:51





Expected date of discharge: 08/09/22


Attending physician: 


Tyron Hummel MD





Consults: 





                                        





08/02/22 14:00


Consult Physician Stat 


   Consulting Provider: Clarence Braga


   Consult Reason/Comments: UTI, unable to establish IDC, bleeding


   Do you want consulting provider notified?: Yes





08/03/22 15:45


Consult Physician Routine 


   Consulting Provider: Keila John


   Consult Reason/Comments: Sepsis, UTI


   Do you want consulting provider notified?: Yes





08/06/22 08:27


Consult Physician Routine 


   Consulting Provider: Bal Bustillos


   Consult Reason/Comments: a-fib RVR, hx a-fib


   Do you want consulting provider notified?: Yes











Primary care physician: 


Angelica Alvarez





Hospital Course: 


Final Diagnoses:





Sepsis secondary to  bacterial prostatitis with suspected acute UTI,  repeat 

cultures pending in a patient with history of ESBL,  prostate cancer with 

radiation seeds , urinary retention.  Elevated pro-calcitonin


Urinary retention secondary to urethral stricture lending to difficult catheter 

insertion/coude', history of cystoscopy with urethral dilations with , 

Urology.  Status post dilation and placement of 12-Canadian coud tip catheter per

urology.  Anderson catheter discontinued 08/08/2022.


Acute metabolic encephalopathy, multifactorial, all the above, environment, in a

patient with underlying dementia.


Acute A. fib with RVR, converted on Cardizem drip, in a patient with Chronic 

paroxysmal atrial fibrillation on anticoagulation, follows with cardiologist Dr. Bustillos.


Fall, Suspected rolled out of bed hitting bedside table.  Head, cervical spine 

CT reported negative. X-rays of bilateral shoulder and humerus noted.


Leukocytosis secondary to the above, improving with the addition of Diflucan.


Mild hyperchloremia


Hypoalbuminemia


Chronic renal failure, stage II, secondary to chronic urinary retention


Chronic anemia secondary to the above


Hyponatremia, hypovolemic, mild, improved


Hypomagnesemia, supplemented


Chronic mild persistent bronchial asthma, stable


History of high right frontal meningioma , suspected CVA, no residuals.


History of rheumatoid arthritis, treated in the past, not on immunosuppressants


History of lumbar degenerative disc disease, spinal stenosis, L5 spondylosis, 

facet arthropathy, L3-4 laminectomy


Gait dysfunction, right foot drop, ambulates with walker


Underlying dementia


[place]


Medical debility, secondary to prolonged hospitalization, bedridden 7 days.











Hospital course:This is a 84-year-old  gentleman with known history of 

paroxysmal atrial fibrillation, follows with Dr. Bustillos cardiologist, asthma, 

rheumatoid arthritis, history of prostate cancer status post radiation seeds, 

recurrent UTIs secondary to urinary retention related to urethral stricture 

lending to difficult catheter insertion/coude', history of cystoscopy with 

urethral dilations with , Urology, history of CVA with no residuals and 

multiple other medical issues sent to the ER from [facility].  Patient 

discovered on the floor at bedside with suspected fall out of bed, possbily 

hitting bedside table.  At the time of incident patient reported to have 

difficulty lifiting left left shoulder, with pain, febrile, temp of 102 with 

shaking/chills/ near rigors reported.  Received  cold wash cloth to forehead 

along with Tylenol and transported to the ER via EMS.T-max 99.9 on admission, 

post tylenol prior to transfer from [facility]. Vague historian secondary to underlying

dementia.  WBC 17.4, hemoglobin 11.8, platelets 345, neutrophils 15.5, INR 1.2, 

sodium 134, potassium 4.4, bicarb 21, BUN 22, creatinine 0.8 to, GFR 78, lactic 

acid 1.4, magnesium 1.5 troponin negative 1, albumin 3.  UA reported large 

leukocytes, 2+ protein, plus ketones, small amount of blood, urine wbc's greater

than 182, elevated urine RBCs, few bacteria, culture with micro-pending.  Blood 

cultures obtained.  Chest x-ray limited exam, reporting no active 

cardiopulmonary disease. Head/cervical spine CT reported mild enlargement of 

ventricles, some cerebral cortical atrophy , no mass effect or midline shift , 

no sign of intracranial hemorrhage ,extra-axial calcified mass in the right 

parietal lobe convexity consistent with meningioma measuring 1.5 cm in diameter,

small meningioma in the right side of the anterior cerebral falx, cervical 

spondylitic changes, no fracture, cerebral atrophy with no acute intracranial 

abnormality, brain unchanged compared to prior exam.  EKG reporting sinus rhythm

with occasional PVC.Resting comfortably, mild diaphoresis, denies chest pain, 

palpitations or shortness of breath.  Denies nausea, vomiting or diarrhea.  

Denies abdominal pain.  IV fluid hydration and IV antibiotics of Rocephin and 

Zosyn initiated.





08/03/2022 T-max 100.9, WBC decreased to 11.4.  Currently maintained on Zosyn.  

Urine and blood cultures, finalizing.  Last night required dilation of urethral 

strictures with placement of coud-tipped Anderson catheter per urology secondary 

to urinary retention.  Urine cloudy, significant sediment, repeat urine culture 

ordered.  Extremely fatigued this morning.








08/04/2022 continued spiking temperatures, infectious disease consulted.  T-max 

101.2. Blood and urine cultures redrawn/pending.  Antibiotics adjusted further 

to ertapenem.  Extreme fatigue/lethargy secondary to infection/fevers/sepsis.  

Minimal diet intake.  Maintaining O2 sats in the high 90s on room air.  Denies 

pain.








08/05/2022 .  Diet intake improving, more so later in the day.  Fevers 

persistant on Zosyn ; currently maintained on ertapenem, with significant 

improvement in fevers , T-max 99.7, WBC normalized.  Urine clearer, now without 

sediment, less concentrated.Hemoglobin 9.9, platelets 332, BUN 15, creatinine 

0.5, magnesium 2.  Fatigued. Denies chest pain, palpitations or shortness of 

breath.  Maintaining O2 sats in the mid 90s on room air.





8/8/22   Over the weekend developed atrial fibrillation with RVR, converted on 

Cardizem drip.  Evaluated by cardiology.  Received additional dose of beta 

blocker early a.m. with Beta blocker increased.  Controlled A. fib throughout 

the night, this morning heart rates fluctuating up into the 130s briefly.  T-max

99, WBC 17.5, diaphoretic.  Sediment cleared from urine.  Staff reports no signs

or symptoms of aspiration.  No cough.  Lungs clear. Continues on ertapenem. 

Blood and urine cultures remain clear.  Renal function stable.  Diet intake 

poor, consuming 25%.  Medically debilitated, has not been out of bed yet, 

requires 2 person assist.








Beta blocker further increased to 3 times a day as per cardiology, with heart 

rates better controlled.  Repeat urine and blood cultures obtained, Diflucan 

added to med regimen yesterday with significant clinical results, afebrile, WBC 

decreased to 13.7.  Sensorium improving, more alert this morning.  Significant 

clinical improvement, cleared by all consults for discharge.  Patient will be 

discharged to [facility] today in a stable condition with 

guarded prognosis.  Final culture results to infectious disease Dr. John and 

PCP.














The impression and plan of care has been dictated as directed.





:


I performed a history and examination of this patient,  discussed the same with 

the dictator.  I agree with the dictator's note ,documented as a scribe.  Any 

additional findings or plans will be noted.


Patient Condition at Discharge: Stable





Plan - Discharge Summary


Discharge Rx Participant: No


New Discharge Prescriptions: 


New


   Artificial Tears-Hypromellose [Artificial Tear Drops] 2 drops BOTH EYES Q4HR 

PRN #1 ml


     PRN Reason: Dry Eye(S)


   Lactobacillus Acidoph & Bulgar [Lactinex] 1 each PO DAILY #30 packet


   Fluconazole [Diflucan] 200 mg PO DAILY #7 tablet


   Magnesium Oxide [Mag-Ox] 400 mg PO DAILY #30 tablet


   cefUROXime axetiL [Ceftin] 500 mg PO BID 1 Days #14 tab


   Tamsulosin [Flomax] 0.4 mg PO PC-BRKFST #30 cap


   Metoprolol Tartrate [Lopressor] 25 mg PO TID #90 tab





Continue


   Folic Acid 1 mg PO DAILY@1800


   Cyanocobalamin [Vitamin B-12] 500 mcg PO DAILY@0800


   Donepezil [Aricept] 10 mg PO DAILY@0800


   Colloidal Oatmeal [Eucerin Eczema Relief] 1 applic TOPICAL BID


   ALPRAZolam [Xanax] 0.5 mg PO BID PRN


     PRN Reason: Anxiety


   Pantoprazole [Protonix] 40 mg PO DAILY@0800


   Ipratropium-Albuterol Nebulize [Duoneb 0.5 mg-3 mg/3 ml Soln] 3 ml INHALATION

RT-QID PRN


     PRN Reason: Shortness Of Breath


   Magnesium Hydroxide [Milk of Magnesia] 2,400 mg PO HS PRN


     PRN Reason: Constipation


   Mag Hydrox/Aluminum Hyd/Simeth [Mylanta Maximum Strength Liq] 15 ml PO BID 

PRN


     PRN Reason: Gi Upset


   Zinc Sulfate [Orazinc] 220 mg PO DAILY@0800


   Pravastatin Sodium [Pravachol] 40 mg PO HS@1800


   Fluticasone/Vilanterol [Breo Ellipta 100-25 Mcg Inhaler] 1 puff INHALATION 

RT-DAILY@0800


   Mirtazapine [Remeron] 30 mg PO HS@1800


   Apixaban [Eliquis] 2.5 mg PO BID@0800,1800


   Acetaminophen Tab [Tylenol] 650 mg PO Q4H PRN


     PRN Reason: Pain Or Fever > 100.5


   guaiFENesin SYRUP 100MG/5ML [Robitussin] 1 dose PO Q6H PRN


     PRN Reason: Cough


   Cranberry Fruit Extract [Cranberry] 500 mg PO DAILY@0800


   Kaolin Pectin 30 ml PO DAILY PRN


     PRN Reason: Loose Stool





Changed


   Ascorbic Acid [Vitamin C] 500 mg PO DAILY #1


   QUEtiapine [SEROquel] 25 mg PO HS@1800 #30 tab





Discontinued


   Metoprolol Tartrate [Lopressor] 12.5 mg PO BID@0800,1800


   Ipratropium-Albuterol Nebulize [Duoneb 0.5 mg-3 mg/3 ml Soln] 3 ml INHALATION

RT-Q3H PRN


     PRN Reason: Shortness Of Breath


Discharge Medication List





Folic Acid 1 mg PO DAILY@1800 08/16/17 [History]


ALPRAZolam [Xanax] 0.5 mg PO BID PRN 08/09/20 [History]


Colloidal Oatmeal [Eucerin Eczema Relief] 1 applic TOPICAL BID 08/09/20 

[History]


Cyanocobalamin [Vitamin B-12] 500 mcg PO DAILY@0800 08/09/20 [History]


Donepezil [Aricept] 10 mg PO DAILY@0800 08/09/20 [History]


Ipratropium-Albuterol Nebulize [Duoneb 0.5 mg-3 mg/3 ml Soln] 3 ml INHALATION 

RT-QID PRN 08/09/20 [History]


Pantoprazole [Protonix] 40 mg PO DAILY@0800 08/09/20 [History]


Acetaminophen Tab [Tylenol] 650 mg PO Q4H PRN 08/02/22 [History]


Apixaban [Eliquis] 2.5 mg PO BID@0800,1800 08/02/22 [History]


Cranberry Fruit Extract [Cranberry] 500 mg PO DAILY@0800 08/02/22 [History]


Fluticasone/Vilanterol [Breo Ellipta 100-25 Mcg Inhaler] 1 puff INHALATION RT-

DAILY@0800 08/02/22 [History]


Kaolin Pectin 30 ml PO DAILY PRN 08/02/22 [History]


Mag Hydrox/Aluminum Hyd/Simeth [Mylanta Maximum Strength Liq] 15 ml PO BID PRN 

08/02/22 [History]


Magnesium Hydroxide [Milk of Magnesia] 2,400 mg PO HS PRN 08/02/22 [History]


Mirtazapine [Remeron] 30 mg PO HS@1800 08/02/22 [History]


Pravastatin Sodium [Pravachol] 40 mg PO HS@1800 08/02/22 [History]


Zinc Sulfate [Orazinc] 220 mg PO DAILY@0800 08/02/22 [History]


guaiFENesin SYRUP 100MG/5ML [Robitussin] 1 dose PO Q6H PRN 08/02/22 [History]


Artificial Tears-Hypromellose [Artificial Tear Drops] 2 drops BOTH EYES Q4HR PRN

#1 ml 08/08/22 [Rx]


Ascorbic Acid [Vitamin C] 500 mg PO DAILY #1 08/08/22 [Rx]


Fluconazole [Diflucan] 200 mg PO DAILY #7 tablet 08/08/22 [Rx]


Lactobacillus Acidoph & Bulgar [Lactinex] 1 each PO DAILY #30 packet 08/08/22 

[Rx]


Tamsulosin [Flomax] 0.4 mg PO PC-BRKFST #30 cap 08/08/22 [Rx]


cefUROXime axetiL [Ceftin] 500 mg PO BID 1 Days #14 tab 08/08/22 [Rx]


Magnesium Oxide [Mag-Ox] 400 mg PO DAILY #30 tablet 08/09/22 [Rx]


Metoprolol Tartrate [Lopressor] 25 mg PO TID #90 tab 08/09/22 [Rx]


QUEtiapine [SEROquel] 25 mg PO HS@1800 #30 tab 08/09/22 [Rx]








Follow up Appointment(s)/Referral(s): 


Bal Bustillos MD [STAFF PHYSICIAN] - 2 Weeks (( when scheduling, please schedule 

in afternoon at [address] [place] [address]))


Angelica Alvarez MD [Primary Care Provider] - 1 Week


Ambulatory/Diagnostic Orders: 


Complete Blood Count w/diff [LAB.AMB] Time Frame: 3 Days, Location: None 

Selected


Activity/Diet/Wound Care/Special Instructions: 


[place] [facility] ( [place]) [facility]





[facility]


[phone]


Fax [phone]








***Forward procalcitonin level results to Dr. John, Dr. Tyron Hummel & Dr. Alvarez***














Diet: regular





***Pt needs high Protein diet. Vanilla ensure or carnation drinks/shakes between

 meals***








Pt currently is a 2 person assist.





HR q shift


BP daily





Ensure patient is voiding q shift


Discharge Disposition: TRANSFER TO SNF/ECF

## 2022-09-26 NOTE — P.HPIM
History of Present Illness


H&P Date: 09/26/22


Chief Complaint: Since shaking, drooling, leaning to the right with accompanied 

labored br





This is a 84-year-old  gentleman with known history of paroxysmal 

atrial fibrillation, follows with Dr. Bustillos cardiologist, asthma, rheumatoid 

arthritis, history of prostate cancer status post radiation seeds, recurrent 

UTIs secondary to urinary retention related to urethral stricture lending to 

difficult catheter insertion/coude', history of cystoscopy with urethral dilati

ons with , Urology, history of CVA with no residuals and multiple other 

medical issues sent to the ER from [facility].  Staff reports that upon 

getting patient up in his Ik[place] chair this morning before while down for 

breakfast, patient became pale ,developed uncontrollable shakes, accompanied by 

drooling, and proceeded to lean to the right into the staff member, developing 

increased shortness of breath.  Did not lose consciousness.  No nausea vomiting 

or diarrhea. Prior to procedure patient had been talking, post procedure patient

not conversing.  Shakiness resolved prior to EMS arrival.  No syncope.  Further 

EMS records currently unavailable. Glucose on admission 110.  EKG reporting 

sinus bradycardia, ventricular rate 57, brain CT no acute intracranial 

hemorrhage or midline shift, mild to moderate diffuse atrial agent cerebral 

atrophy and moderate to severe chronic small vessel ischemic changes 

redemonstrated and felt stable, worsening acute on chronic bilateral maxillary a

nd ethmoid sinus disease.  Chest x-ray reported chronic emphysematous changes 

without acute pulmonary process, no significant change from prior.  Afebrile, 

WBC 8.4, hemoglobin 11.2, platelets 290, INR 1, sodium 137, potassium 4.2, 

bicarb 25, BUN 29, creatinine 0.62, troponin negative 1, total protein and 

albumin low at 6.2,3.  UA reporting tricyclic antidepressants.  Bladder scan 

reported 150, staff unable to straight cath secondary to patient has known 

history of significant urethral stricture requiring catheter placement as per 

urology.  At bedside patient was able to answer simple yes and no questions at 

times, extremely hard of hearing.  Earlier presented with mild expressive 

aphasia per daughter.








Review of Systems


Limited secondary to patient's current increased confusion.


ROS Statement: 


Those systems with pertinent positive or pertinent negative responses have been 

documented in the HPI.





ROS Other: All systems not noted in ROS Statement are negative.





Past Medical History


Past Medical History: Atrial Fibrillation, Asthma, Cancer, CVA/TIA, Dementia, GI

Bleed, Prostate Disorder, Rheumatoid Arthritis (RA)


Additional Past Medical History / Comment(s): chronic back problems, Rt foot 

drop, Uses a walker, prostate cancer, radiation seeds; suspected cva 2017; 

covid; strictures


History of Any Multi-Drug Resistant Organisms: ESBL


Date of last positivie culture/infection: 01/01/2020


MDRO Source:: URINE ESBL


Past Surgical History: Back Surgery


Additional Past Surgical History / Comment(s): RECENT PICC LINE, NOW REMOVED, 

EVA CATARACTS, R hand surgery; laminectomy


Past Anesthesia/Blood Transfusion Reactions: No Reported Reaction


Past Psychological History: No Psychological Hx Reported


Smoking Status: Never smoker


Past Alcohol Use History: None Reported


Past Drug Use History: None Reported





- Past Family History


  ** Father


Family Medical History: No Reported History





  ** Mother


Family Medical History: GI Bleed





Medications and Allergies


                                Home Medications











 Medication  Instructions  Recorded  Confirmed  Type


 


Folic Acid 1 mg PO DAILY@1800 08/16/17 09/26/22 History


 


ALPRAZolam [Xanax] 0.5 mg PO BID PRN 08/09/20 09/26/22 History


 


Cyanocobalamin [Vitamin B-12] 500 mcg PO DAILY@0800 08/09/20 09/26/22 History


 


Donepezil [Aricept] 10 mg PO DAILY@0800 08/09/20 09/26/22 History


 


Ipratropium-Albuterol Nebulize 3 ml INHALATION RT-QID PRN 08/09/20 09/26/22 Hist

ory





[Duoneb 0.5 mg-3 mg/3 ml Soln]    


 


Pantoprazole [Protonix] 40 mg PO DAILY@0800 08/09/20 09/26/22 History


 


Apixaban [Eliquis] 2.5 mg PO BID@0800,1800 08/02/22 09/26/22 History


 


Cranberry Fruit Extract [Cranberry] 500 mg PO DAILY@0800 08/02/22 09/26/22 

History


 


Fluticasone/Vilanterol [Breo 1 puff INHALATION RT-DAILY@0800 08/02/22 09/26/22 

History





Ellipta 100-25 Mcg Inhaler]    


 


Magnesium Hydroxide [Milk of 2,400 mg PO Q48H PRN 08/02/22 09/26/22 History





Magnesia]    


 


Mirtazapine [Remeron] 30 mg PO HS@1800 08/02/22 09/26/22 History


 


Pravastatin Sodium [Pravachol] 40 mg PO HS@1800 08/02/22 09/26/22 History


 


Zinc Sulfate [Orazinc] 220 mg PO DAILY@0800 08/02/22 09/26/22 History


 


guaiFENesin SYRUP 100MG/5ML 200 mg PO Q6H PRN 08/02/22 09/26/22 History





[Robitussin]    


 


Artificial Tears-Hypromellose 2 drops BOTH EYES Q4HR PRN #1 ml 08/08/22 09/26/22

 Rx





[Artificial Tear Drops]    


 


QUEtiapine [SEROquel] 25 mg PO HS@1800 #30 tab 08/09/22 09/26/22 Rx


 


Ascorbic Acid [Vitamin C] 500 mg PO BID@0800,1800 09/26/22 09/26/22 History


 


Floranex Packet 1 packet PO DAILY@0800 09/26/22 09/26/22 History


 


Hyoscyamine Sulfate [Levsin-Sl] 0.125 mg SL Q4H PRN 09/26/22 09/26/22 History


 


LORazepam [Ativan] 0.5 mg PO Q4H PRN 09/26/22 09/26/22 History


 


MORPHINE ORAL SOL CONC 20mg/mL 5 mg PO Q4H PRN 09/26/22 09/26/22 History





[Roxanol Oral Soln Conc 20MG/ML]    


 


Magnesium Oxide [Mag-Ox] 400 mg PO W/BRKFST@0800 09/26/22 09/26/22 History


 


Metoprolol Tartrate [Lopressor] 25 mg PO BID@0800,2000 09/26/22 09/26/22 History


 


Permethrin 5% Cream [Elimite] 1 applic TOPICAL DAILY@0800 09/26/22 09/26/22 

History


 


Tamsulosin [Flomax] 0.4 mg PO PC-BRKFST@0800 09/26/22 09/26/22 History


 


bisacodyL [Dulcolax] 10 mg RECTAL Q72H PRN 09/26/22 09/26/22 History








                                    Allergies











Allergy/AdvReac Type Severity Reaction Status Date / Time


 


No Known Allergies Allergy   Verified 09/26/22 09:37














Physical Exam


Vitals: 


                                   Vital Signs











  Temp Pulse Resp BP Pulse Ox


 


 09/26/22 11:59   68  18  139/62  96


 


 09/26/22 07:42  98.4 F  64  18  146/50  95








                                Intake and Output











 09/25/22 09/26/22 09/26/22





 22:59 06:59 14:59


 


Other:   


 


  Weight   58.967 kg











PHYSICAL EXAM:


VITAL SIGNS: As above


GENERAL: Lying on stretcher, no acute distress.  Confused, alert and oriented 

1.[place].


HEENT: Atraumatic, normocephalic , pupils round and equal ,Conjunctivae normal.


NECK:  No JVD. No thyroid enlargement. No LNs


CARDIOVASCULAR:  S1, S2 regular.  Systolic murmur


RESPIRATION: Breath sounds diminished in the bases. No rhonchi or crackles. No 

bronchial breathing.


ABDOMEN:  Soft,  nontender, nondistended . No guarding. no masses palpable. No 

ascites, No hepatosplenomegaly.Bowel sounds heard.


Extremities: Bilateral Dupuytren's contractures, history of right hand surgery, 

No edema. no swelling.


PSYCHIATRY: Alert and oriented X1, confused


NERVOUS SYSTEM: Limited exam. Cranial N 2-12 grossly normal.


Skin: Warm and dry, no rash.  Left tip of great toe, bilateral heel ulcers-refer

 to nursing documentation.














Results


CBC & Chem 7: 


                                 09/26/22 08:19





                                 09/26/22 08:19


Labs: 


                  Abnormal Lab Results - Last 24 Hours (Table)











  09/26/22 09/26/22 Range/Units





  08:19 08:19 


 


RBC  3.84 L   (4.30-5.90)  m/uL


 


Hgb  11.2 L   (13.0-17.5)  gm/dL


 


Hct  34.9 L   (39.0-53.0)  %


 


BUN   29 H  (9-20)  mg/dL


 


Creatinine   0.62 L  (0.66-1.25)  mg/dL


 


Glucose   110 H  (74-99)  mg/dL


 


Total Protein   6.2 L  (6.3-8.2)  g/dL


 


Albumin   3.0 L  (3.5-5.0)  g/dL














Assessment and Plan


Assessment: 


Acute metabolic encephalopathy secondary to suspected acute UTI related to 

urinary retention, in a patient with history of ESBL,  prostate cancer with 

radiation seeds , urinary retention.  





Possible acute seizure activity, per [facility] report ,neurology consulted





Recently discharged on 08/08/2022 with sepsis secondary to bacterial prostatitis

 with suspected acute UTI, urine and blood cultures that time failed to report 

any growth, despite fevers, elevated WBC and pro-calcitonin.





History of Urinary retention secondary to urethral stricture lending to 

difficult catheter insertion/coude', history of cystoscopy with urethral 

dilations with , Urology.  Status post dilation and placement of 12-

[place] sor[place] coud tip catheter per urology on prior admission with Anderson catheter 

discontinued 08/08/2022.





Bilateral heel and left great toe ulcers, present on admission; upon discharge-

patient had sustained bilateral heel & left great toe blisters that later 

transitioned to wounds.  Debrided at wound care center on 9/22/22.  (Offloading 

boots with heels to be floating at all times)





Chronic Paroximal A. fib with RVR, converted on Cardizem drip, in a patient with

 Chronic paroxysmal atrial fibrillation on anticoagulation, follows with 

cardiologist Dr. Bustillos.





Hypoalbuminemia





Chronic renal failure, stage II, secondary to chronic urinary retention





Chronic anemia secondary to the above





Chronic mild persistent bronchial asthma, stable





History of high right frontal meningioma , suspected CVA, no residuals.





History of rheumatoid arthritis, treated in the past, not on immunosuppressants





History of lumbar degenerative disc disease, spinal stenosis, L5 spondylosis, 

facet arthropathy, L3-4 laminectomy





Risk secondary to increased medical debility, increased weakness ( 2 person 

assist), dementia in a patient with prior HX of Gait dysfunction, bilateral foot

 drop-greatest in the right.





Underlying dementia, progressive





Q[place]in

















Plan: Continue on current medication regime ,monitoring and symptomatic treatm

ent.  Urology consulted for coud Anderson catheter placement/urinary retention 

with UA/urine culture to be obtained at that time.  Recent wound debridement of 

bilateral heels, offloading boots ordered with heels to be free-floating at all 

times-orders discussed with nurse.  Wound care consulted.  Neurology consulted. 

 Prognosis guarded given multiple complex medical issues.











The impression and plan of care has been dictated as directed.





:


I performed a history and examination of this patient,  discussed the same with 

the dictator.  I agree with the dictator's note ,documented as a scribe.  Any 

additional findings or plans will be noted.


Plan: 





Start rocephin, follow cultures

## 2022-09-26 NOTE — ED
General Adult HPI





- General


Chief complaint: Altered Mental Status


Stated complaint: UTI


Time Seen by Provider: 09/26/22 07:41


Source: patient, EMS, RN notes reviewed


Mode of arrival: EMS


Limitations: altered mental status





- History of Present Illness


Initial comments: 





Patient is a pleasant 89-year-old male presenting to the emergency department 

with concerns for change in mental status.  Patient comes from nursing facility.

 He should states he feels fine and has no complaints.  Patient does not provide

significant history.  Patient limits exam and requests to be left alone.





- Related Data


                                Home Medications











 Medication  Instructions  Recorded  Confirmed


 


Folic Acid 1 mg PO DAILY@1800 08/16/17 09/26/22


 


ALPRAZolam [Xanax] 0.5 mg PO BID PRN 08/09/20 09/26/22


 


Cyanocobalamin [Vitamin B-12] 500 mcg PO DAILY@0800 08/09/20 09/26/22


 


Donepezil [Aricept] 10 mg PO DAILY@0800 08/09/20 09/26/22


 


Ipratropium-Albuterol Nebulize 3 ml INHALATION RT-QID PRN 08/09/20 09/26/22





[Duoneb 0.5 mg-3 mg/3 ml Soln]   


 


Pantoprazole [Protonix] 40 mg PO DAILY@0800 08/09/20 09/26/22


 


Apixaban [Eliquis] 2.5 mg PO BID@0800,1800 08/02/22 09/26/22


 


Cranberry Fruit Extract [Cranberry] 500 mg PO DAILY@0800 08/02/22 09/26/22


 


Fluticasone/Vilanterol [Breo 1 puff INHALATION RT-DAILY@0800 08/02/22 09/26/22





Ellipta 100-25 Mcg Inhaler]   


 


Magnesium Hydroxide [Milk of 2,400 mg PO Q48H PRN 08/02/22 09/26/22





Magnesia]   


 


Mirtazapine [Remeron] 30 mg PO HS@1800 08/02/22 09/26/22


 


Pravastatin Sodium [Pravachol] 40 mg PO HS@1800 08/02/22 09/26/22


 


Zinc Sulfate [Orazinc] 220 mg PO DAILY@0800 08/02/22 09/26/22


 


guaiFENesin SYRUP 100MG/5ML 200 mg PO Q6H PRN 08/02/22 09/26/22





[Robitussin]   


 


Ascorbic Acid [Vitamin C] 500 mg PO BID@0800,1800 09/26/22 09/26/22


 


Floranex Packet 1 packet PO DAILY@0800 09/26/22 09/26/22


 


Hyoscyamine Sulfate [Levsin-Sl] 0.125 mg SL Q4H PRN 09/26/22 09/26/22


 


LORazepam [Ativan] 0.5 mg PO Q4H PRN 09/26/22 09/26/22


 


MORPHINE ORAL SOL CONC 20mg/mL 5 mg PO Q4H PRN 09/26/22 09/26/22





[Roxanol Oral Soln Conc 20MG/ML]   


 


Magnesium Oxide [Mag-Ox] 400 mg PO W/BRKFST@0800 09/26/22 09/26/22


 


Metoprolol Tartrate [Lopressor] 25 mg PO BID@0800,2000 09/26/22 09/26/22


 


Permethrin 5% Cream [Elimite] 1 applic TOPICAL DAILY@0800 09/26/22 09/26/22


 


Tamsulosin [Flomax] 0.4 mg PO PC-BRKFST@0800 09/26/22 09/26/22


 


bisacodyL [Dulcolax] 10 mg RECTAL Q72H PRN 09/26/22 09/26/22








                                  Previous Rx's











 Medication  Instructions  Recorded


 


Artificial Tears-Hypromellose 2 drops BOTH EYES Q4HR PRN #1 ml 08/08/22





[Artificial Tear Drops]  


 


QUEtiapine [SEROquel] 25 mg PO HS@1800 #30 tab 08/09/22











                                    Allergies











Allergy/AdvReac Type Severity Reaction Status Date / Time


 


No Known Allergies Allergy   Verified 09/26/22 09:37














Review of Systems


ROS Statement: 


Those systems with pertinent positive or pertinent negative responses have been 

documented in the HPI.





ROS Other: All systems not noted in ROS Statement are negative.


Limitations: ROS unobtainable due to patients medical condition





Past Medical History


Past Medical History: Atrial Fibrillation, Asthma, Cancer, CVA/TIA, Dementia, GI

 Bleed, Prostate Disorder, Rheumatoid Arthritis (RA)


Additional Past Medical History / Comment(s): chronic back problems, Rt foot 

drop, Uses a walker, prostate cancer, radiation seeds; suspected cva 2017; 

covid; strictures


History of Any Multi-Drug Resistant Organisms: ESBL


Date of last positivie culture/infection: 01/01/2020


MDRO Source:: URINE ESBL


Past Surgical History: Back Surgery


Additional Past Surgical History / Comment(s): RECENT PICC LINE, NOW REMOVED, 

EVA CATARACTS, R hand surgery; laminectomy


Past Anesthesia/Blood Transfusion Reactions: No Reported Reaction


Past Psychological History: No Psychological Hx Reported


Smoking Status: Never smoker


Past Alcohol Use History: None Reported


Past Drug Use History: None Reported





- Past Family History


  ** Father


Family Medical History: No Reported History





  ** Mother


Family Medical History: GI Bleed





General Exam


Limitations: altered mental status


General appearance: alert, in no apparent distress


Head exam: Present: atraumatic


Eye exam: Present: normal appearance, PERRL, EOMI


ENT exam: Present: normal oropharynx


Neck exam: Present: normal inspection.  Absent: meningismus


Respiratory exam: Present: normal lung sounds bilaterally


Cardiovascular Exam: Present: regular rate, normal rhythm


GI/Abdominal exam: Present: soft.  Absent: tenderness


Neurological exam: Present: alert, altered (Does not answer questions 

appropriately).  Absent: motor sensory deficit (Limited exam)


  ** Expanded


Motor strength exam: RUE: 5, LUE: 5, RLE: 5, LLE: 5


Eye Response: (4) open spontaneously


Motor Response: (6) obeys commands


Verbal Response: (4) confused conversation


Psychiatric exam: Present: normal affect, normal mood


Skin exam: Present: normal color





Course


                                   Vital Signs











  09/26/22





  07:42


 


Temperature 98.4 F


 


Pulse Rate 64


 


Respiratory 18





Rate 


 


Blood Pressure 146/50


 


O2 Sat by Pulse 95





Oximetry 














EKG Findings





- EKG Comments:


EKG Findings:: Since bradycardia 57.  .  QRS 94.  .  .  

Normal axis.  Normal QRS.  No acute ST change.





Medical Decision Making





- Medical Decision Making





Family was updated.  Case was discussed with Dr. Graves who did evaluate patient

 and will admit





- Lab Data


Result diagrams: 


                                 09/26/22 08:19





                                 09/26/22 08:19


                                   Lab Results











  09/26/22 09/26/22 09/26/22 Range/Units





  08:19 08:19 08:19 


 


WBC  8.4    (3.8-10.6)  k/uL


 


RBC  3.84 L    (4.30-5.90)  m/uL


 


Hgb  11.2 L    (13.0-17.5)  gm/dL


 


Hct  34.9 L    (39.0-53.0)  %


 


MCV  91.0    (80.0-100.0)  fL


 


MCH  29.2    (25.0-35.0)  pg


 


MCHC  32.1    (31.0-37.0)  g/dL


 


RDW  14.0    (11.5-15.5)  %


 


Plt Count  290    (150-450)  k/uL


 


MPV  7.9    


 


Neutrophils %  72    %


 


Lymphocytes %  14    %


 


Monocytes %  10    %


 


Eosinophils %  2    %


 


Basophils %  1    %


 


Neutrophils #  6.0    (1.3-7.7)  k/uL


 


Lymphocytes #  1.2    (1.0-4.8)  k/uL


 


Monocytes #  0.8    (0-1.0)  k/uL


 


Eosinophils #  0.2    (0-0.7)  k/uL


 


Basophils #  0.0    (0-0.2)  k/uL


 


PT   11.1   (9.0-12.0)  sec


 


INR   1.0   (<1.2)  


 


APTT   22.4   (22.0-30.0)  sec


 


Sodium    137  (137-145)  mmol/L


 


Potassium    4.2  (3.5-5.1)  mmol/L


 


Chloride    102  ()  mmol/L


 


Carbon Dioxide    25  (22-30)  mmol/L


 


Anion Gap    10  mmol/L


 


BUN    29 H  (9-20)  mg/dL


 


Creatinine    0.62 L  (0.66-1.25)  mg/dL


 


Est GFR (CKD-EPI)AfAm    >90  (>60 ml/min/1.73 sqM)  


 


Est GFR (CKD-EPI)NonAf    88  (>60 ml/min/1.73 sqM)  


 


Glucose    110 H  (74-99)  mg/dL


 


Calcium    8.5  (8.4-10.2)  mg/dL


 


Total Bilirubin    0.6  (0.2-1.3)  mg/dL


 


AST    25  (17-59)  U/L


 


ALT    18  (4-49)  U/L


 


Alkaline Phosphatase    92  ()  U/L


 


Troponin I     (0.000-0.034)  ng/mL


 


Total Protein    6.2 L  (6.3-8.2)  g/dL


 


Albumin    3.0 L  (3.5-5.0)  g/dL














  09/26/22 Range/Units





  08:19 


 


WBC   (3.8-10.6)  k/uL


 


RBC   (4.30-5.90)  m/uL


 


Hgb   (13.0-17.5)  gm/dL


 


Hct   (39.0-53.0)  %


 


MCV   (80.0-100.0)  fL


 


MCH   (25.0-35.0)  pg


 


MCHC   (31.0-37.0)  g/dL


 


RDW   (11.5-15.5)  %


 


Plt Count   (150-450)  k/uL


 


MPV   


 


Neutrophils %   %


 


Lymphocytes %   %


 


Monocytes %   %


 


Eosinophils %   %


 


Basophils %   %


 


Neutrophils #   (1.3-7.7)  k/uL


 


Lymphocytes #   (1.0-4.8)  k/uL


 


Monocytes #   (0-1.0)  k/uL


 


Eosinophils #   (0-0.7)  k/uL


 


Basophils #   (0-0.2)  k/uL


 


PT   (9.0-12.0)  sec


 


INR   (<1.2)  


 


APTT   (22.0-30.0)  sec


 


Sodium   (137-145)  mmol/L


 


Potassium   (3.5-5.1)  mmol/L


 


Chloride   ()  mmol/L


 


Carbon Dioxide   (22-30)  mmol/L


 


Anion Gap   mmol/L


 


BUN   (9-20)  mg/dL


 


Creatinine   (0.66-1.25)  mg/dL


 


Est GFR (CKD-EPI)AfAm   (>60 ml/min/1.73 sqM)  


 


Est GFR (CKD-EPI)NonAf   (>60 ml/min/1.73 sqM)  


 


Glucose   (74-99)  mg/dL


 


Calcium   (8.4-10.2)  mg/dL


 


Total Bilirubin   (0.2-1.3)  mg/dL


 


AST   (17-59)  U/L


 


ALT   (4-49)  U/L


 


Alkaline Phosphatase   ()  U/L


 


Troponin I  <0.012  (0.000-0.034)  ng/mL


 


Total Protein   (6.3-8.2)  g/dL


 


Albumin   (3.5-5.0)  g/dL














- Radiology Data


Radiology results: report reviewed (Computed tomography scan of the brain shows 

no acute process), image reviewed (Chest x-ray shows no acute process)





Disposition


Clinical Impression: 


 Altered mental status





Disposition: ADMITTED AS IP TO THIS HOSP


Is patient prescribed a controlled substance at d/c from ED?: No


Referrals: 


Angelica Alvarez MD [Primary Care Provider] - 1-2 days


Time of Disposition: 11:49

## 2022-09-26 NOTE — CT
EXAMINATION TYPE: CT brain wo con

 

DATE OF EXAM: 9/26/2022

 

HISTORY: ams

 

CT DLP: 1166.4 mGycm.  Automated Exposure Control for Dose Reduction was Utilized.

 

TECHNIQUE: CT scan of the head is performed without contrast.

 

COMPARISON: CT brain August 2, 2022.

 

FINDINGS:   There is no acute intracranial hemorrhage or midline shift identified. There is mild to m
oderate diffuse ventricular and sulcal prominence consistent with diffuse age-related cerebral atroph
y.  There is moderate to severe low-attenuation in the periventricular white matter consistent with c
hronic small vessel ischemic change. New dependent fluid in the bilateral maxillary sinuses with mild
 mucosal thickening. Increasing opacification in the ethmoid sinuses bilaterally. Globes are intact b
ilaterally.   

 

IMPRESSION:  No acute intracranial hemorrhage or midline shift.  There is mild-to-moderate diffuse ag
e-related cerebral atrophy and moderate-to-severe chronic small vessel ischemic change redemonstrated
 and felt stable.  Worsening acute on chronic bilateral maxillary and ethmoid sinus disease present, 
correlate clinically.

## 2022-09-26 NOTE — P.GSCN
History of Present Illness


Consult date: 09/26/22


History of present illness: 





89-year-old gentleman in the hospital with altered mental status.  The 

possibility of a urinary tract infection is considered.  The patient could not 

give a voided urinary specimen in the emergency room thus the staff attempted to

pass a catheter but were unable to do so.  The patient has a known history of a 

urethral stricture.  He recently was in the hospital or I had to dilate his 

stricture and to place a catheter were urine retention and urosepsis.  The 

patient has a history of strictures or for by Dr. Mullen years past.  The patient

can give no history.  The nursing staff is obtained a bladder residual and the 

bladder is relatively empty less than 100 mL.  He is wearing a condom catheter. 

His white count is normal.  He is not febrile.  His urinalysis looks inflamed.





Review of Systems


ROS unobtainable: due to mental status





Past Medical History


Past Medical History: Atrial Fibrillation, Asthma, Cancer, CVA/TIA, Dementia, GI

Bleed, Prostate Disorder, Rheumatoid Arthritis (RA)


Additional Past Medical History / Comment(s): chronic back problems, Rt foot 

drop, Uses a walker, prostate cancer, radiation seeds; suspected cva 2017; 

covid; strictures


History of Any Multi-Drug Resistant Organisms: ESBL


Year Discovered:: 01/01/2020


MDRO Source:: URINE ESBL


Past Surgical History: Back Surgery


Additional Past Surgical History / Comment(s): RECENT PICC LINE, NOW REMOVED, 

EVA CATARACTS, R hand surgery; laminectomy


Past Anesthesia/Blood Transfusion Reactions: No Reported Reaction


Past Psychological History: No Psychological Hx Reported


Smoking Status: Never smoker


Past Alcohol Use History: None Reported


Past Drug Use History: None Reported





- Past Family History


  ** Father


Family Medical History: No Reported History





  ** Mother


Family Medical History: GI Bleed





Medications and Allergies


                                Home Medications











 Medication  Instructions  Recorded  Confirmed  Type


 


Folic Acid 1 mg PO DAILY@1800 08/16/17 09/26/22 History


 


ALPRAZolam [Xanax] 0.5 mg PO BID PRN 08/09/20 09/26/22 History


 


Cyanocobalamin [Vitamin B-12] 500 mcg PO DAILY@0800 08/09/20 09/26/22 History


 


Donepezil [Aricept] 10 mg PO DAILY@0800 08/09/20 09/26/22 History


 


Ipratropium-Albuterol Nebulize 3 ml INHALATION RT-QID PRN 08/09/20 09/26/22 

History





[Duoneb 0.5 mg-3 mg/3 ml Soln]    


 


Pantoprazole [Protonix] 40 mg PO DAILY@0800 08/09/20 09/26/22 History


 


Apixaban [Eliquis] 2.5 mg PO BID@0800,1800 08/02/22 09/26/22 History


 


Cranberry Fruit Extract [Cranberry] 500 mg PO DAILY@0800 08/02/22 09/26/22 

History


 


Fluticasone/Vilanterol [Breo 1 puff INHALATION RT-DAILY@0800 08/02/22 09/26/22 

History





Ellipta 100-25 Mcg Inhaler]    


 


Magnesium Hydroxide [Milk of 2,400 mg PO Q48H PRN 08/02/22 09/26/22 History





Magnesia]    


 


Mirtazapine [Remeron] 30 mg PO HS@1800 08/02/22 09/26/22 History


 


Pravastatin Sodium [Pravachol] 40 mg PO HS@1800 08/02/22 09/26/22 History


 


Zinc Sulfate [Orazinc] 220 mg PO DAILY@0800 08/02/22 09/26/22 History


 


guaiFENesin SYRUP 100MG/5ML 200 mg PO Q6H PRN 08/02/22 09/26/22 History





[Robitussin]    


 


Artificial Tears-Hypromellose 2 drops BOTH EYES Q4HR PRN #1 ml 08/08/22 09/26/22

 Rx





[Artificial Tear Drops]    


 


QUEtiapine [SEROquel] 25 mg PO HS@1800 #30 tab 08/09/22 09/26/22 Rx


 


Ascorbic Acid [Vitamin C] 500 mg PO BID@0800,1800 09/26/22 09/26/22 History


 


Floranex Packet 1 packet PO DAILY@0800 09/26/22 09/26/22 History


 


Hyoscyamine Sulfate [Levsin-Sl] 0.125 mg SL Q4H PRN 09/26/22 09/26/22 History


 


LORazepam [Ativan] 0.5 mg PO Q4H PRN 09/26/22 09/26/22 History


 


MORPHINE ORAL SOL CONC 20mg/mL 5 mg PO Q4H PRN 09/26/22 09/26/22 History





[Roxanol Oral Soln Conc 20MG/ML]    


 


Magnesium Oxide [Mag-Ox] 400 mg PO W/BRKFST@0800 09/26/22 09/26/22 History


 


Metoprolol Tartrate [Lopressor] 25 mg PO BID@0800,2000 09/26/22 09/26/22 History


 


Permethrin 5% Cream [Elimite] 1 applic TOPICAL DAILY@0800 09/26/22 09/26/22 

History


 


Tamsulosin [Flomax] 0.4 mg PO PC-BRKFST@0800 09/26/22 09/26/22 History


 


bisacodyL [Dulcolax] 10 mg RECTAL Q72H PRN 09/26/22 09/26/22 History








                                    Allergies











Allergy/AdvReac Type Severity Reaction Status Date / Time


 


No Known Allergies Allergy   Verified 09/26/22 09:37














Surgical - Exam


                                   Vital Signs











Temp Pulse Resp BP Pulse Ox


 


 98.4 F   64   18   146/50   95 


 


 09/26/22 07:42  09/26/22 07:42  09/26/22 07:42  09/26/22 07:42  09/26/22 07:42














- General





Thin white male cannot communicate.





- ENT


no hearing loss





- Neck


no masses





- Respiratory


normal expansion, normal respiratory effort





- Abdomen


Abdomen: soft, non tender





- Genitourinary





Condom catheter with clear urine.  Normal testicles.





- Neurologic


combative, memory loss





Results





- Labs





                                 09/26/22 08:19





                                 09/26/22 08:19


                  Abnormal Lab Results - Last 24 Hours (Table)











  09/26/22 09/26/22 09/26/22 Range/Units





  08:19 08:19 08:19 


 


RBC  3.84 L    (4.30-5.90)  m/uL


 


Hgb  11.2 L    (13.0-17.5)  gm/dL


 


Hct  34.9 L    (39.0-53.0)  %


 


BUN    29 H  (9-20)  mg/dL


 


Creatinine    0.62 L  (0.66-1.25)  mg/dL


 


Glucose    110 H  (74-99)  mg/dL


 


Total Protein    6.2 L  (6.3-8.2)  g/dL


 


Albumin    3.0 L  (3.5-5.0)  g/dL


 


Urine Protein   1+ H   (Negative)  


 


Urine Blood   Large H   (Negative)  


 


Ur Leukocyte Esterase   Large H   (Negative)  


 


Urine RBC   143 H   (0-5)  /hpf


 


Urine WBC   70 H   (0-5)  /hpf


 


Urine WBC Clumps   Rare H   (None)  /hpf


 


Calcium Oxalate Crystal   Rare H   (None)  /hpf


 


Urine Bacteria   Occasional H   (None)  /hpf


 


U Tricyclic Antidepress   Detected H   (NotDetected)  








                                 Diabetes panel











  09/26/22 Range/Units





  08:19 


 


Sodium  137  (137-145)  mmol/L


 


Potassium  4.2  (3.5-5.1)  mmol/L


 


Chloride  102  ()  mmol/L


 


Carbon Dioxide  25  (22-30)  mmol/L


 


BUN  29 H  (9-20)  mg/dL


 


Creatinine  0.62 L  (0.66-1.25)  mg/dL


 


Glucose  110 H  (74-99)  mg/dL


 


Calcium  8.5  (8.4-10.2)  mg/dL


 


AST  25  (17-59)  U/L


 


ALT  18  (4-49)  U/L


 


Alkaline Phosphatase  92  ()  U/L


 


Total Protein  6.2 L  (6.3-8.2)  g/dL


 


Albumin  3.0 L  (3.5-5.0)  g/dL








                                  Calcium panel











  09/26/22 Range/Units





  08:19 


 


Calcium  8.5  (8.4-10.2)  mg/dL


 


Albumin  3.0 L  (3.5-5.0)  g/dL








                                 Pituitary panel











  09/26/22 Range/Units





  08:19 


 


Sodium  137  (137-145)  mmol/L


 


Potassium  4.2  (3.5-5.1)  mmol/L


 


Chloride  102  ()  mmol/L


 


Carbon Dioxide  25  (22-30)  mmol/L


 


BUN  29 H  (9-20)  mg/dL


 


Creatinine  0.62 L  (0.66-1.25)  mg/dL


 


Glucose  110 H  (74-99)  mg/dL


 


Calcium  8.5  (8.4-10.2)  mg/dL








                                  Adrenal panel











  09/26/22 Range/Units





  08:19 


 


Sodium  137  (137-145)  mmol/L


 


Potassium  4.2  (3.5-5.1)  mmol/L


 


Chloride  102  ()  mmol/L


 


Carbon Dioxide  25  (22-30)  mmol/L


 


BUN  29 H  (9-20)  mg/dL


 


Creatinine  0.62 L  (0.66-1.25)  mg/dL


 


Glucose  110 H  (74-99)  mg/dL


 


Calcium  8.5  (8.4-10.2)  mg/dL


 


Total Bilirubin  0.6  (0.2-1.3)  mg/dL


 


AST  25  (17-59)  U/L


 


ALT  18  (4-49)  U/L


 


Alkaline Phosphatase  92  ()  U/L


 


Total Protein  6.2 L  (6.3-8.2)  g/dL


 


Albumin  3.0 L  (3.5-5.0)  g/dL














Assessment and Plan


Assessment: 





Impression: Altered mental status possibly urine infection possibly chronic skin

 breakdown.





Recommendations: Since the bladder is empty and a urine specimen has been 

collected I do not recommend placing a catheter is due to his mental status 

he'll pull it out and cause urethral trauma.  Unless he has a difficult time 

urinating and we treat him based on the culture obtained, observe his urine and 

make sure he doesn't go into retention.

## 2022-09-26 NOTE — XR
EXAMINATION TYPE: XR chest 2V

 

DATE OF EXAM: 9/26/2022

 

COMPARISON: Chest x-ray August 2, 2022

 

HISTORY: Altered mental status and weakness.

 

TECHNIQUE:  Frontal and lateral views of the chest are obtained.

 

FINDINGS:  There is chronic emphysematous change without suspicious focal air space opacity, pleural 
effusion, or pneumothorax seen.  The cardiac silhouette size is upper limits of normal.   The osseous
 structures are intact.

 

IMPRESSION:  Chronic emphysematous change without acute pulmonary process. No significant change from
 prior.

## 2022-09-27 NOTE — US
EXAMINATION TYPE: US venous doppler duplex LE 

 

DATE OF EXAM: 9/27/2022 1:22 PM

 

COMPARISON: NONE

 

CLINICAL HISTORY: venous insufficiency, DVT. COnfused elderly male that had to be held by nurse aid t
o perform test, no h/o dvt, non healing wounds

 

SIDE PERFORMED: Bilateral  

 

TECHNIQUE:  The lower extremity deep venous system is examined utilizing real time linear array sonog
nasrin with graded compression, doppler sonography and color-flow sonography.

 

VESSELS IMAGED:

Common Femoral Vein

Deep Femoral Vein

Greater Saphenous Vein *

Femoral Vein

Popliteal Vein

Small Saphenous Vein *

Proximal Calf Veins

(* superficial vessels)

 

Limited imaging behind knee due to patients inability to hold still - good blood flow was seen 

 

Right Leg:  Negative for DVT

 

Left Leg:  Negative for DVT

 

There is normal flow, compressibility, vascular waveforms.

 

IMPRESSION: No evident deep venous thrombosis from the level of the knees centrally. Exam was not per
formed for venous insufficiency.

## 2022-09-27 NOTE — P.PN
Subjective


Progress Note Date: 09/20/22


H&P Date: 09/26/22


Chief Complaint: Since shaking, drooling, leaning to the right with accompanied 

labored br





This is a 84-year-old  gentleman with known history of paroxysmal 

atrial fibrillation, follows with Dr. Bustillos cardiologist, asthma, rheumatoid 

arthritis, history of prostate cancer status post radiation seeds, recurrent 

UTIs secondary to urinary retention related to urethral stricture lending to 

difficult catheter insertion/coude', history of cystoscopy with urethral 

dilations with , Urology, history of CVA with no residuals and multiple 

other medical issues sent to the ER from [facility].  Staff reports that 

upon getting patient up in his Kiki chair this morning before while down for 

breakfast, patient became pale ,developed uncontrollable shakes, accompanied by 

drooling, and proceeded to lean to the right into the staff member, developing 

increased shortness of breath.  Did not lose consciousness.  No nausea vomiting 

or diarrhea. Prior to procedure patient had been talking, post procedure patient

not conversing.  Shakiness resolved prior to EMS arrival.  No syncope.  Further 

EMS records currently unavailable. Glucose on admission 110.  EKG reporting 

sinus bradycardia, ventricular rate 57, brain CT no acute intracranial 

hemorrhage or midline shift, mild to moderate diffuse atrial agent cerebral 

atrophy and moderate to severe chronic small vessel ischemic changes 

redemonstrated and felt stable, worsening acute on chronic bilateral maxillary 

and ethmoid sinus disease.  Chest x-ray reported chronic emphysematous changes 

without acute pulmonary process, no significant change from prior.  Afebrile, 

WBC 8.4, hemoglobin 11.2, platelets 290, INR 1, sodium 137, potassium 4.2, 

bicarb 25, BUN 29, creatinine 0.62, troponin negative 1, total protein and 

albumin low at 6.2,3.  UA reporting tricyclic antidepressants.  Bladder scan 

reported 150, staff unable to straight cath secondary to patient has known his

tory of significant urethral stricture requiring catheter placement as per 

urology.  At bedside patient was able to answer simple yes and no questions at 

times, extremely hard of hearing.  Earlier presented with mild expressive 

aphasia per daughter.








09/27/2022 no further shakiness, no seizure activity reported. Evaluated by 

neurology, workup in progress, labs/EEG pending. Brain CT suggested worsening 

acute on chronic bilateral maxillary and ethmoid sinus disease, asymptomatic 

clinically.  Maintained on IV antibiotics.  UA reported rare WBC,, 70 WBCs, 

large leukocytes, negative nitrates, culture , preliminary blood culture 

reported no growth after 24 hours .  Wound dressing changed last night with no 

drainage reported. T-max 101.3, WBC 8.76.











Objective





- Vital Signs


Vital signs: 


                                   Vital Signs











Temp  99.1 F   09/27/22 08:45


 


Pulse  62   09/27/22 08:45


 


Resp  15   09/27/22 08:45


 


BP  108/61   09/27/22 08:45


 


Pulse Ox  96   09/27/22 08:45


 


FiO2      








                                 Intake & Output











 09/26/22 09/27/22 09/27/22





 18:59 06:59 18:59


 


Intake Total  410 240


 


Output Total 85 950 


 


Balance -85 -540 240


 


Weight 58.967 kg  


 


Intake:   


 


  Intake, IV Titration  50 





  Amount   


 


    cefTRIAXone 1 gm In  50 





    Sodium Chloride 0.9% 50   





    ml @ 100 mls/hr IVPB ONCE   





    STA Rx#:[id number]   


 


  Oral  360 240


 


Output:   


 


  Urine  950 


 


  Post Void Residual 85  


 


Other:   


 


  Voiding Method External Catheter External Catheter 


 


  # Bowel Movements 1 0 














- Exam


PHYSICAL EXAM:


VITAL SIGNS: As above


GENERAL: Sitting up in bed, no acute distress. Confused, alert and oriented 1.H

OH.


HEENT: Atraumatic, normocephalic , pupils round and equal ,Conjunctivae normal.


NECK:  No JVD. No thyroid enlargement. No LNs


CARDIOVASCULAR:  S1, S2 regular. Systolic murmur


RESPIRATION: Breath sounds diminished in the bases. No rhonchi or crackles. No 

bronchial breathing.


ABDOMEN:  Soft, nontender, nondistended . No guarding. no masses palpable.  

Possible Bowel sounds.


Extremities: Bilateral Dupuytren's contractures, history of right hand surgery, 

No edema. no swelling.  Left forearm and left hand tender.


NERVOUS SYSTEM: Limited exam. Cranial N 2-12 grossly normal.


Skin: Warm and dry, no rash.  Left tip of great toe, bilateral heel and left 

great toe dressings clean dry and intact, wearing offloading boots











- Labs


CBC & Chem 7: 


                                 09/27/22 06:15





                                 09/27/22 06:15


Labs: 


                  Abnormal Lab Results - Last 24 Hours (Table)











  09/26/22 09/26/22 09/27/22 Range/Units





  08:19 16:54 06:15 


 


RBC    3.52 L  (4.40-5.60)  X 10*6/uL


 


Hgb    10.4 L  (13.0-17.0)  g/dL


 


Hct    31.1 L  (39.6-50.0)  %


 


Lymphocytes #    0.83 L  (0.90-5.00)  X 10*3/uL


 


Monocytes #    1.22 H  (0.20-1.00)  X 10*3/uL


 


Eosinophils #    0.03 L  (0.04-0.35)  X 10*3/uL


 


Anion Gap     (10.00-18.00)  mmol/L


 


BUN/Creatinine Ratio     (12.00-20.00)  Ratio


 


Glucose     ()  mg/dL


 


Calcium     (8.7-10.3)  mg/dL


 


TSH   0.051 L   (0.350-5.500)  uIU/mL


 


Urine Protein  1+ H    (Negative)  


 


Urine Blood  Large H    (Negative)  


 


Ur Leukocyte Esterase  Large H    (Negative)  


 


Urine RBC  143 H    (0-5)  /hpf


 


Urine WBC  70 H    (0-5)  /hpf


 


Urine WBC Clumps  Rare H    (None)  /hpf


 


Calcium Oxalate Crystal  Rare H    (None)  /hpf


 


Urine Bacteria  Occasional H    (None)  /hpf


 


U Tricyclic Antidepress  Detected H    (NotDetected)  














  09/27/22 Range/Units





  06:15 


 


RBC   (4.40-5.60)  X 10*6/uL


 


Hgb   (13.0-17.0)  g/dL


 


Hct   (39.6-50.0)  %


 


Lymphocytes #   (0.90-5.00)  X 10*3/uL


 


Monocytes #   (0.20-1.00)  X 10*3/uL


 


Eosinophils #   (0.04-0.35)  X 10*3/uL


 


Anion Gap  9.90 L  (10.00-18.00)  mmol/L


 


BUN/Creatinine Ratio  38.60 H  (12.00-20.00)  Ratio


 


Glucose  114 H  ()  mg/dL


 


Calcium  8.3 L  (8.7-10.3)  mg/dL


 


TSH   (0.350-5.500)  uIU/mL


 


Urine Protein   (Negative)  


 


Urine Blood   (Negative)  


 


Ur Leukocyte Esterase   (Negative)  


 


Urine RBC   (0-5)  /hpf


 


Urine WBC   (0-5)  /hpf


 


Urine WBC Clumps   (None)  /hpf


 


Calcium Oxalate Crystal   (None)  /hpf


 


Urine Bacteria   (None)  /hpf


 


U Tricyclic Antidepress   (NotDetected)  








                      Microbiology - Last 24 Hours (Table)











 09/26/22 08:22 Blood Culture - Preliminary





 Blood    No Growth after 24 hours


 


 09/26/22 08:19 Urine Culture - Preliminary





 Urine,Clean Catch 














Assessment and Plan


Assessment: 


Acute metabolic encephalopathy secondary to suspected acute on chronic UTI re

lated to urinary retention, in a patient with history of ESBL,  prostate cancer 

with radiation seeds , urinary retention.  





Possible acute seizure activity, per [facility] report , no further seizure activity 

reported; suspect symptoms secondary to possibly hypoglycemic episode though 

blood sugar normal on admission, possible secondary to infection, UTI, possibly 

orthostatic hypotension. neurology consulted.





Recently discharged on 08/08/2022 with sepsis secondary to bacterial prostatitis

with suspected acute UTI, urine and blood cultures that time failed to report 

any growth, despite fevers, elevated WBC and pro-calcitonin.





History of Urinary retention secondary to urethral stricture lending to 

difficult catheter insertion/coude', history of cystoscopy with urethral 

dilations with , Urology.  Status post dilation and placement of 12-

Urdu coud tip catheter per urology on prior admission with Anderson catheter 

discontinued 08/08/2022.





Bilateral heel -stage II and left great toe ulcers, nonpressure-unstageable 

,present on admission; upon discharge-patient had sustained bilateral heel & 

left great toe blisters that later transitioned to wounds.  Debrided at wound 

care center on 9/22/22.  (Offloading boots with heels to be floating at all 

times)





Chronic Paroximal A. fib with RVR, converted on Cardizem drip, in a patient with

Chronic paroxysmal atrial fibrillation on anticoagulation, follows with 

cardiologist Dr. Bustillos.





Hypoalbuminemia





Chronic renal failure, stage II, secondary to chronic urinary retention





Chronic anemia secondary to the above





Chronic mild persistent bronchial asthma, stable





History of high right frontal meningioma , suspected CVA, no residuals.





History of rheumatoid arthritis, treated in the past, not on immunosuppressants





History of lumbar degenerative disc disease, spinal stenosis, L5 spondylosis, 

facet arthropathy, L3-4 laminectomy





Risk secondary to increased medical debility, increased weakness ( 2 person 

assist), dementia in a patient with prior HX of Gait dysfunction, bilateral foot

drop-greatest in the right.





Underlying dementia, advanced





[place]

















Plan: Continue on current medication regime ,monitoring and symptomatic 

treatment.  Labs pending Maintained on IV antibiotics, infectious disease 

consulted.  Neurology workup in progress, including EEG.  Left hand and forearm 

x-rays ordered secondary to new onset pain.  Wound care including heel 

protectors and Santyl as per wound care mgmt. evaluated by urology , no Anderson 

catheter placement at this time, bladder scanned for less than 100. Prognosis 

guarded given multiple complex medical issues.











The impression and plan of care has been dictated as directed.





:


I performed a history and examination of this patient,  discussed the same with 

the dictator.  I agree with the dictator's note ,documented as a scribe.  Any 

additional findings or plans will be noted.

## 2022-09-27 NOTE — P.CNNES
History of Present Illness


Consult date: 09/27/22


Requesting physician: Laura Watson


Reason for Consult: possible seizure activity


History of Present Illness: 


This is an 89-year-old gentleman with history of stroke without residual 

deficits, advanced dementia, paroxysmal atrial fibrillation on eliquis, prostate

cancer status post radiation seeds, recurrent urinary tract infection presented 

emergency department on 09/26/2022 for questionable possible seizure.  History 

is obtained from medical record that.  Patient resides and an [facility] home and it 

seems that that the patient the while trying to get up from his Kiki chair on 

09/26/2022 he became pale develop uncontrolled body shakes accompanied by 

drooling and was leaning to the right with increased shortness of breath but did

not lose consciousness.  It seems that the patient's shakiness resolved prior to

EMS arrival.  This episode transpired prior to breakfast.


Per the daughter (Laura Watson) he is having word finding difficulty since 

yesterday and today.  Per the daughter he fell (rolled out of bed at his [facility]) in

August 2022.   Denies to this no history of seizure.





Some other workup during this hospital visit consisted of:


Initial presentation his vital signs his blood pressure of 146/50, heart rate of

64, respiratory of 18, temperature of 98.4 Fahrenheit axillary and the pulse ox 

of 95% room air.  His T-max during this hospital visit is 101.3 Fahrenheit


White blood cell has been normal during during this hospital visit.


Initial serum glucose is 110.  AST and ALT is within normal limits


TSH is 0.051 and the free T4 is 1.260


Urinalysis is suggestive of likely urinary tract infection


Urine tox screen is detected the positive for tricyclic antidepressant.


CT of the head is reported as no acute intracranial hemorrhage or midline shift.

 There is mild to moderate diffuse age-related cerebral atrophy and moderate to 

severe chronic small vessel ischemic changes redemonstrated and felt stable.  

Worsening acute on chronic bilateral maxillary and ethmoid sinus disease 

present, correlate clinically.  I personally reviewed the CT and agree with the 

finding of the report but in addition I felt the patient has a right frontal 

calcified likely meningioma.  And in the 2017 CT is reported as small calcified 

calcifying mass and interbody it is mentioned hemangioma or osteochondroma.  

I'll personally reviewed the CT 2017 I feel the size is about the same and it 

seems in the right frontal region as well.








Review of Systems


Review of system is limited but the pertinent positive and negative as per HPI.








Past Medical History


Past Medical History: Atrial Fibrillation, Asthma, Cancer, CVA/TIA, Dementia, GI

Bleed, Prostate Disorder, Rheumatoid Arthritis (RA)


Additional Past Medical History / Comment(s): chronic back problems, Rt foot 

drop, Uses a walker, prostate cancer, radiation seeds; suspected cva 2017; 

covid; strictures


History of Any Multi-Drug Resistant Organisms: ESBL


Date of last positivie culture/infection: 01/01/2020


MDRO Source:: URINE ESBL


Past Surgical History: Back Surgery


Additional Past Surgical History / Comment(s): RECENT PICC LINE, NOW REMOVED, 

EVA CATARACTS, R hand surgery; laminectomy


Past Anesthesia/Blood Transfusion Reactions: No Reported Reaction


Past Psychological History: No Psychological Hx Reported


Smoking Status: Never smoker


Past Alcohol Use History: None Reported


Past Drug Use History: None Reported





- Past Family History


  ** Father


Family Medical History: No Reported History





  ** Mother


Family Medical History: GI Bleed





Medications and Allergies


                                Home Medications











 Medication  Instructions  Recorded  Confirmed  Type


 


Folic Acid 1 mg PO DAILY@1800 08/16/17 09/26/22 History


 


ALPRAZolam [Xanax] 0.5 mg PO BID PRN 08/09/20 09/26/22 History


 


Cyanocobalamin [Vitamin B-12] 500 mcg PO DAILY@0800 08/09/20 09/26/22 History


 


Donepezil [Aricept] 10 mg PO DAILY@0800 08/09/20 09/26/22 History


 


Ipratropium-Albuterol Nebulize 3 ml INHALATION RT-QID PRN 08/09/20 09/26/22 

History





[Duoneb 0.5 mg-3 mg/3 ml Soln]    


 


Pantoprazole [Protonix] 40 mg PO DAILY@0800 08/09/20 09/26/22 History


 


Apixaban [Eliquis] 2.5 mg PO BID@0800,1800 08/02/22 09/26/22 History


 


Cranberry Fruit Extract [Cranberry] 500 mg PO DAILY@0800 08/02/22 09/26/22 

History


 


Fluticasone/Vilanterol [Breo 1 puff INHALATION RT-DAILY@0800 08/02/22 09/26/22 

History





Ellipta 100-25 Mcg Inhaler]    


 


Magnesium Hydroxide [Milk of 2,400 mg PO Q48H PRN 08/02/22 09/26/22 History





Magnesia]    


 


Mirtazapine [Remeron] 30 mg PO HS@1800 08/02/22 09/26/22 History


 


Pravastatin Sodium [Pravachol] 40 mg PO HS@1800 08/02/22 09/26/22 History


 


Zinc Sulfate [Orazinc] 220 mg PO DAILY@0800 08/02/22 09/26/22 History


 


guaiFENesin SYRUP 100MG/5ML 200 mg PO Q6H PRN 08/02/22 09/26/22 History





[Robitussin]    


 


Artificial Tears-Hypromellose 2 drops BOTH EYES Q4HR PRN #1 ml 08/08/22 09/26/22

 Rx





[Artificial Tear Drops]    


 


QUEtiapine [SEROquel] 25 mg PO HS@1800 #30 tab 08/09/22 09/26/22 Rx


 


Ascorbic Acid [Vitamin C] 500 mg PO BID@0800,1800 09/26/22 09/26/22 History


 


Floranex Packet 1 packet PO DAILY@0800 09/26/22 09/26/22 History


 


Hyoscyamine Sulfate [Levsin-Sl] 0.125 mg SL Q4H PRN 09/26/22 09/26/22 History


 


LORazepam [Ativan] 0.5 mg PO Q4H PRN 09/26/22 09/26/22 History


 


MORPHINE ORAL SOL CONC 20mg/mL 5 mg PO Q4H PRN 09/26/22 09/26/22 History





[Roxanol Oral Soln Conc 20MG/ML]    


 


Magnesium Oxide [Mag-Ox] 400 mg PO W/BRKFST@0800 09/26/22 09/26/22 History


 


Metoprolol Tartrate [Lopressor] 25 mg PO BID@0800,2000 09/26/22 09/26/22 History


 


Permethrin 5% Cream [Elimite] 1 applic TOPICAL DAILY@0800 09/26/22 09/26/22 

History


 


Tamsulosin [Flomax] 0.4 mg PO PC-BRKFST@0800 09/26/22 09/26/22 History


 


bisacodyL [Dulcolax] 10 mg RECTAL Q72H PRN 09/26/22 09/26/22 History








                                    Allergies











Allergy/AdvReac Type Severity Reaction Status Date / Time


 


No Known Allergies Allergy   Verified 09/26/22 09:37














Physical Examination





- Vital Signs


Vital Signs: 


                                   Vital Signs











  Temp Pulse Pulse Resp BP BP BP


 


 09/27/22 08:45  99.1 F   62  15   108/61 


 


 09/27/22 05:28  99.2 F      


 


 09/27/22 04:10  101.3 F H   80  18    120/47


 


 09/26/22 22:30  98.3 F      


 


 09/26/22 21:39  100.3 F H   64  14    109/66


 


 09/26/22 14:49  99.7 F H   59 L  16   97/29 


 


 09/26/22 13:30   68   18  112/62  


 


 09/26/22 11:59   68   18  139/62  














  Pulse Ox


 


 09/27/22 08:45  96


 


 09/27/22 05:28 


 


 09/27/22 04:10  100


 


 09/26/22 22:30 


 


 09/26/22 21:39  100


 


 09/26/22 14:49  96


 


 09/26/22 13:30  95


 


 09/26/22 11:59  96








                                Intake and Output











 09/26/22 09/27/22 09/27/22





 22:59 06:59 14:59


 


Intake Total  410 240


 


Output Total 385 650 


 


Balance -385 -240 240


 


Intake:   


 


  Intake, IV Titration  50 





  Amount   


 


    cefTRIAXone 1 gm In  50 





    Sodium Chloride 0.9% 50   





    ml @ 100 mls/hr IVPB ONCE   





    STA Rx#:[id number]   


 


  Oral  360 240


 


Output:   


 


  Urine 300 650 


 


  Post Void Residual 85  


 


Other:   


 


  Voiding Method External Catheter  


 


  # Bowel Movements 1 0 











GENERAL: The patient is lying in bed and is not in acute distress.


CHEST: The heart rate is regular rate rhythm.   No murmurs to auscultation.  


LUNG: Clear to auscultation bilaterally no wheezing noted throughout.  Not 

labored breathing.


ABDOMEN/GI: Bowel sounds present in all 4 quadrants. No tenderness to palpation 

throughout.





NEUROLOGICAL:


Limited because of his cooperation.


Higher mental function: The patient is awake, alert, oriented to self only.  He 

is able to name some objects (watch and phone).  He is following few simple 

commands.  Language is limited


Cranial nerves: The pupils are round, equal and reactive to light.  No facial 

weakness.  No dysarthria noted.  Otherwise rest is limited.   


Motor: The strength is limited because of his cooperation. Was able to lift his 

right upper extremity above gravity.  Has his left hand flexed and left hand and

forearm was pain to touch but was able to lift proximally above gravity without 

difficulty. Unable to assess lowers because of his cooperation. 


Cerebellum: Unable to assess.


Sensation: Unable to assess.


Reflexes (right/left): Unable to assess.


Plantars are mute bilaterally. 








Results





- Laboratory Findings


CBC and BMP: 


                                 09/27/22 06:15





                                 09/27/22 06:15


Abnormal Lab Findings: 


                                  Abnormal Labs











  09/26/22 09/26/22 09/26/22





  08:19 08:19 08:19


 


RBC  3.84 L  


 


Hgb  11.2 L  


 


Hct  34.9 L  


 


BUN    29 H


 


Creatinine    0.62 L


 


Glucose    110 H


 


Total Protein    6.2 L


 


Albumin    3.0 L


 


TSH   


 


Urine Protein   1+ H 


 


Urine Blood   Large H 


 


Ur Leukocyte Esterase   Large H 


 


Urine RBC   143 H 


 


Urine WBC   70 H 


 


Urine WBC Clumps   Rare H 


 


Calcium Oxalate Crystal   Rare H 


 


Urine Bacteria   Occasional H 


 


U Tricyclic Antidepress   Detected H 














  09/26/22





  16:54


 


RBC 


 


Hgb 


 


Hct 


 


BUN 


 


Creatinine 


 


Glucose 


 


Total Protein 


 


Albumin 


 


TSH  0.051 L


 


Urine Protein 


 


Urine Blood 


 


Ur Leukocyte Esterase 


 


Urine RBC 


 


Urine WBC 


 


Urine WBC Clumps 


 


Calcium Oxalate Crystal 


 


Urine Bacteria 


 


U Tricyclic Antidepress 














Assessment and Plan


Assessment: 





Questionable seizure activity at the [facility] facility (reported that upon trying to 

get up from his Kiki chair he became pale and developed uncontrolleable body 

shakes with drooling and leaning towards the right).  Possibly provoked due to 

underlying UTI


Acute on chronic urinary tract infection


Advanced dementia


History of stroke without residual deficits


Right frontal calcified mass unchanged since 2017 (one of differential is 

meningioma)


History of prostate cancer status post radiation seeds


Paroxysmal atrial fibrillation on eliquis





Plan: 





I ordered a routine EEG.  I'll not start the patient on an antiepileptic drug 

unless there is any epileptiform form discharges or seizure on the EEG or the 

patient has any further seizure episodes.


I ordered ammonia level, vitamin B12, folate.


Recommend orthostatic vitals once the patient is more stable.  So recommend x-

ray of the left hand forearm since the patient is in pain.


PT and OT are consulted


Defer the rest of the medical management to the primary team


Commended the patient dad to be evaluated by a neurologist as an outpatient 

within 1-2 weeks upon discharge


Patient CODE STATUS is DO NOT RESUSCITATE





Plan was discussed with the patient's daughter via phone and she is in agreement

with the plan.  She'll discuss the case with the nurse.





Thank you for the consultation.





Shai Gamez M.D.


[facility]





Time with Patient: Greater than 30

## 2022-09-27 NOTE — P.PN
Subjective


Progress Note Date: 09/27/22





The patient is in the hospital with fever and disorientation.  I was asked to 

see for possible catheter.  The patient seems to be emptying his bladder 

appropriately.  He does have a history of urethral stricture.  His urine looks 

inflamed probably consistent with a urinary tract infection.  Ultrasound pen

ding.  I do not feel it necessary to place a catheter given that he is emptying 

his bladder adequately.  I'll follow with you.





Objective





- Vital Signs


Vital signs: 


                                   Vital Signs











Temp  99.2 F   09/27/22 05:28


 


Pulse  80   09/27/22 04:10


 


Resp  18   09/27/22 04:10


 


BP  120/47   09/27/22 04:10


 


Pulse Ox  100   09/27/22 04:10


 


FiO2      








                                 Intake & Output











 09/26/22 09/27/22 09/27/22





 18:59 06:59 18:59


 


Intake Total  410 


 


Output Total 85 950 


 


Balance -85 -540 


 


Weight 58.967 kg  


 


Intake:   


 


  Intake, IV Titration  50 





  Amount   


 


    cefTRIAXone 1 gm In  50 





    Sodium Chloride 0.9% 50   





    ml @ 100 mls/hr IVPB ONCE   





    STA Rx#:[id number]   


 


  Oral  360 


 


Output:   


 


  Urine  950 


 


  Post Void Residual 85  


 


Other:   


 


  Voiding Method External Catheter External Catheter 


 


  # Bowel Movements 1 0 














- Labs


CBC & Chem 7: 


                                 09/26/22 08:19





                                 09/26/22 08:19


Labs: 


                  Abnormal Lab Results - Last 24 Hours (Table)











  09/26/22 09/26/22 09/26/22 Range/Units





  08:19 08:19 08:19 


 


RBC  3.84 L    (4.30-5.90)  m/uL


 


Hgb  11.2 L    (13.0-17.5)  gm/dL


 


Hct  34.9 L    (39.0-53.0)  %


 


BUN    29 H  (9-20)  mg/dL


 


Creatinine    0.62 L  (0.66-1.25)  mg/dL


 


Glucose    110 H  (74-99)  mg/dL


 


Total Protein    6.2 L  (6.3-8.2)  g/dL


 


Albumin    3.0 L  (3.5-5.0)  g/dL


 


TSH     (0.350-5.500)  uIU/mL


 


Urine Protein   1+ H   (Negative)  


 


Urine Blood   Large H   (Negative)  


 


Ur Leukocyte Esterase   Large H   (Negative)  


 


Urine RBC   143 H   (0-5)  /hpf


 


Urine WBC   70 H   (0-5)  /hpf


 


Urine WBC Clumps   Rare H   (None)  /hpf


 


Calcium Oxalate Crystal   Rare H   (None)  /hpf


 


Urine Bacteria   Occasional H   (None)  /hpf


 


U Tricyclic Antidepress   Detected H   (NotDetected)  














  09/26/22 Range/Units





  16:54 


 


RBC   (4.30-5.90)  m/uL


 


Hgb   (13.0-17.5)  gm/dL


 


Hct   (39.0-53.0)  %


 


BUN   (9-20)  mg/dL


 


Creatinine   (0.66-1.25)  mg/dL


 


Glucose   (74-99)  mg/dL


 


Total Protein   (6.3-8.2)  g/dL


 


Albumin   (3.5-5.0)  g/dL


 


TSH  0.051 L  (0.350-5.500)  uIU/mL


 


Urine Protein   (Negative)  


 


Urine Blood   (Negative)  


 


Ur Leukocyte Esterase   (Negative)  


 


Urine RBC   (0-5)  /hpf


 


Urine WBC   (0-5)  /hpf


 


Urine WBC Clumps   (None)  /hpf


 


Calcium Oxalate Crystal   (None)  /hpf


 


Urine Bacteria   (None)  /hpf


 


U Tricyclic Antidepress   (NotDetected)  








                      Microbiology - Last 24 Hours (Table)











 09/26/22 08:19 Urine Culture - Preliminary





 Urine,Clean Catch

## 2022-09-27 NOTE — P.CONS
History of Present Illness





- Reason for Consult


Consult date: 09/27/22


wound care





- History of Present Illness


This is an 89-year-old gentleman with history of pressure also some bilateral 

heel and her ulcer to the right foot great toe tip.  Patient has been seen in 

the wound care center where Santyl was started. Original cause of wound was Not 

Known. The date acquired was: 8/2/2022. The wound is currently classified as a 

Unstageable/Unclassified wound with etiology of Pressure Ulcer and is located on

the Left,Distal Toe Great. The wound measures 0.9cm length x 1.4cm width x 0.1cm

depth; 0.99cm^2 area and 0.099cm^3 volume. There is no tunneling or undermining 

noted. There is a none present amount of drainage noted. The wound margin is 

flat and intact. There is a large (%) amount of necrotic tissue within the

wound bed including Eschar. The periwound skin appearance exhibited: Callus. The

periwound skin appearance did not exhibit: Crepitus, Excoriation, Induration, 

Rash, Scarring, Atrophie Yana, Cyanosis, Ecchymosis, Hemosiderin Staining, 

Mottled, Pallor, Rubor, Erythema. Periwound temperature was noted as No 

Abnormality. The periwound has tenderness on palpation. Original cause of wound 

was Pressure Injury. The date acquired was: 8/2/2022. The wound is currently cla

ssified as a Category/Stage III wound with etiology of Pressure Ulcer and is 

located on the Left,Lateral Calcaneus. The wound measures 1.6cm length x 1.4cm 

width x 0.1cm depth; 1.759cm^2 area and 0.176cm^3 volume. There is Fat Layer 

(Subcutaneous Tissue) exposed. There is no tunneling or undermining noted. There

is a medium amount of serosanguineous drainage noted. The wound margin is flat 

and intact. There is medium (34-66%) red, pink granulation within the wound bed.

There is a medium (34-66%) amount of necrotic tissue within the wound bed 

including Adherent Slough. The periwound skin appearance exhibited: Scarring. 

The periwound skin appearance did not exhibit: Callus, Crepitus, Excoriation, 

Induration, Rash, Dry/Scaly, Maceration, Atrophie Yana, Cyanosis, Ecchymosis,

Hemosiderin Staining, Mottled, Rubor, Erythema. Periwound temperature was noted 

as No Abnormality. Original cause of wound was Pressure Injury. The date 

acquired was: 8/2/2022. The wound is currently classified as a Category/Stage 

III wound with etiology of Pressure Ulcer and is located on the Right,Medial 

Calcaneus. The wound measures 1.5cm length x 1.2cm width x 0.2cm depth; 

1.414cm^2 area and 0.283cm^3 volume. There is Fat Layer (Subcutaneous Tissue) 

exposed. There is no tunneling or undermining noted. There is a medium amount of

serosanguineous drainage noted. The wound margin is flat and intact. There is 

small (1-33%) red, pink granulation within the wound bed. There is a large (67-

100%) amount of necrotic tissue within the wound bed including Adherent Slough. 

The periwound skin appearance exhibited: Scarring, Maceration. The periwound 

skin appearance did not exhibit: Callus, Crepitus, Excoriation, Induration, 

Rash, Dry/Scaly, Atrophie Yana, Cyanosis, Ecchymosis, Hemosiderin Staining, 

Mottled, Pallor, Rubor, Erythema. Periwound temperature was noted as No 

Abnormality.





Review Of Systems:


Constitutional: No fever, no chills, no night sweats.  No weight change.  No 

weakness, fatigue or lethargy.  No daytime sleepiness.


Integumentary:reports wounds, no lesions.  No rash or pruritus.  No unusual 

bruising.  No change in hair or nails.





Physical exam:


General Appearance: Alert, cooperative, no distress, appears stated age.


Skin: See HPI all other Skin color, texture, tugor normal, no rashes or lesions.


Neurologic: Alert oriented x3 





Assessment:


1.  Pressure ulcer of right heel stage II


2.  Pressure ulcer left heel stage II


3.  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





Plan:


1.  Apply Santyl, saline moistened gauze, dry gauze, rolled gauze and secure 

with paper tape.  Change daily.


2.  Continue with heel protectors unless ambulating.








Thank you for the consultation any questions please contact the wound care 

center





DNP note has been reviewed and discussed with Dr. Nevarez and the impression 

and plan of care has been directed as dictated. 








Past Medical History


Past Medical History: Atrial Fibrillation, Asthma, Cancer, CVA/TIA, Dementia, GI

Bleed, Prostate Disorder, Rheumatoid Arthritis (RA)


Additional Past Medical History / Comment(s): chronic back problems, Rt foot urbano

p, Uses a walker, prostate cancer, radiation seeds; suspected cva 2017; covid; 

strictures


History of Any Multi-Drug Resistant Organisms: ESBL


Year Discovered:: 01/01/2020


MDRO Source:: URINE ESBL


Past Surgical History: Back Surgery


Additional Past Surgical History / Comment(s): RECENT PICC LINE, NOW REMOVED, 

EVA CATARACTS, R hand surgery; laminectomy


Past Anesthesia/Blood Transfusion Reactions: No Reported Reaction


Past Psychological History: No Psychological Hx Reported


Smoking Status: Never smoker


Past Alcohol Use History: None Reported


Past Drug Use History: None Reported





- Past Family History


  ** Father


Family Medical History: No Reported History





  ** Mother


Family Medical History: GI Bleed





Medications and Allergies


                                Home Medications











 Medication  Instructions  Recorded  Confirmed  Type


 


Folic Acid 1 mg PO DAILY@1800 08/16/17 09/26/22 History


 


ALPRAZolam [Xanax] 0.5 mg PO BID PRN 08/09/20 09/26/22 History


 


Cyanocobalamin [Vitamin B-12] 500 mcg PO DAILY@0800 08/09/20 09/26/22 History


 


Donepezil [Aricept] 10 mg PO DAILY@0800 08/09/20 09/26/22 History


 


Ipratropium-Albuterol Nebulize 3 ml INHALATION RT-QID PRN 08/09/20 09/26/22 

History





[Duoneb 0.5 mg-3 mg/3 ml Soln]    


 


Pantoprazole [Protonix] 40 mg PO DAILY@0800 08/09/20 09/26/22 History


 


Apixaban [Eliquis] 2.5 mg PO BID@0800,1800 08/02/22 09/26/22 History


 


Cranberry Fruit Extract [Cranberry] 500 mg PO DAILY@0800 08/02/22 09/26/22 

History


 


Fluticasone/Vilanterol [Breo 1 puff INHALATION RT-DAILY@0800 08/02/22 09/26/22 

History





Ellipta 100-25 Mcg Inhaler]    


 


Magnesium Hydroxide [Milk of 2,400 mg PO Q48H PRN 08/02/22 09/26/22 History





Magnesia]    


 


Mirtazapine [Remeron] 30 mg PO HS@1800 08/02/22 09/26/22 History


 


Pravastatin Sodium [Pravachol] 40 mg PO HS@1800 08/02/22 09/26/22 History


 


Zinc Sulfate [Orazinc] 220 mg PO DAILY@0800 08/02/22 09/26/22 History


 


guaiFENesin SYRUP 100MG/5ML 200 mg PO Q6H PRN 08/02/22 09/26/22 History





[Robitussin]    


 


Artificial Tears-Hypromellose 2 drops BOTH EYES Q4HR PRN #1 ml 08/08/22 09/26/22

Rx





[Artificial Tear Drops]    


 


QUEtiapine [SEROquel] 25 mg PO HS@1800 #30 tab 08/09/22 09/26/22 Rx


 


Ascorbic Acid [Vitamin C] 500 mg PO BID@0800,1800 09/26/22 09/26/22 History


 


Floranex Packet 1 packet PO DAILY@0800 09/26/22 09/26/22 History


 


Hyoscyamine Sulfate [Levsin-Sl] 0.125 mg SL Q4H PRN 09/26/22 09/26/22 History


 


LORazepam [Ativan] 0.5 mg PO Q4H PRN 09/26/22 09/26/22 History


 


MORPHINE ORAL SOL CONC 20mg/mL 5 mg PO Q4H PRN 09/26/22 09/26/22 History





[Roxanol Oral Soln Conc 20MG/ML]    


 


Magnesium Oxide [Mag-Ox] 400 mg PO W/BRKFST@0800 09/26/22 09/26/22 History


 


Metoprolol Tartrate [Lopressor] 25 mg PO BID@0800,2000 09/26/22 09/26/22 History


 


Permethrin 5% Cream [Elimite] 1 applic TOPICAL DAILY@0800 09/26/22 09/26/22 

History


 


Tamsulosin [Flomax] 0.4 mg PO PC-BRKFST@0800 09/26/22 09/26/22 History


 


bisacodyL [Dulcolax] 10 mg RECTAL Q72H PRN 09/26/22 09/26/22 History








                                    Allergies











Allergy/AdvReac Type Severity Reaction Status Date / Time


 


No Known Allergies Allergy   Verified 09/26/22 09:37














Physical Exam


Vitals: 


                                   Vital Signs











  Temp Pulse Pulse Resp BP BP BP


 


 09/27/22 11:43  98.7 F   65  16    116/62


 


 09/27/22 08:45  99.1 F   62  15   108/61 


 


 09/27/22 05:28  99.2 F      


 


 09/27/22 04:10  101.3 F H   80  18    120/47


 


 09/26/22 22:30  98.3 F      


 


 09/26/22 21:39  100.3 F H   64  14    109/66


 


 09/26/22 14:49  99.7 F H   59 L  16   97/29 


 


 09/26/22 13:30   68   18  112/62  














  Pulse Ox


 


 09/27/22 11:43  97


 


 09/27/22 08:45  96


 


 09/27/22 05:28 


 


 09/27/22 04:10  100


 


 09/26/22 22:30 


 


 09/26/22 21:39  100


 


 09/26/22 14:49  96


 


 09/26/22 13:30  95








                                Intake and Output











 09/26/22 09/27/22 09/27/22





 22:59 06:59 14:59


 


Intake Total  410 240


 


Output Total 385 650 


 


Balance -385 -240 240


 


Intake:   


 


  Intake, IV Titration  50 





  Amount   


 


    cefTRIAXone 1 gm In  50 





    Sodium Chloride 0.9% 50   





    ml @ 100 mls/hr IVPB ONCE   





    STA Rx#:[id number]   


 


  Oral  360 240


 


Output:   


 


  Urine 300 650 


 


  Post Void Residual 85  


 


Other:   


 


  Voiding Method External Catheter  External Catheter


 


  # Bowel Movements 1 0 














Results


CBC & Chem 7: 


                                 09/27/22 06:15





                                 09/27/22 06:15


Labs: 


                  Abnormal Lab Results - Last 24 Hours (Table)











  09/26/22 09/26/22 09/27/22 Range/Units





  08:19 16:54 06:15 


 


RBC    3.52 L  (4.40-5.60)  X 10*6/uL


 


Hgb    10.4 L  (13.0-17.0)  g/dL


 


Hct    31.1 L  (39.6-50.0)  %


 


Lymphocytes #    0.83 L  (0.90-5.00)  X 10*3/uL


 


Monocytes #    1.22 H  (0.20-1.00)  X 10*3/uL


 


Eosinophils #    0.03 L  (0.04-0.35)  X 10*3/uL


 


Anion Gap     (10.00-18.00)  mmol/L


 


BUN/Creatinine Ratio     (12.00-20.00)  Ratio


 


Glucose     ()  mg/dL


 


Calcium     (8.7-10.3)  mg/dL


 


TSH   0.051 L   (0.350-5.500)  uIU/mL


 


Urine Protein  1+ H    (Negative)  


 


Urine Blood  Large H    (Negative)  


 


Ur Leukocyte Esterase  Large H    (Negative)  


 


Urine RBC  143 H    (0-5)  /hpf


 


Urine WBC  70 H    (0-5)  /hpf


 


Urine WBC Clumps  Rare H    (None)  /hpf


 


Calcium Oxalate Crystal  Rare H    (None)  /hpf


 


Urine Bacteria  Occasional H    (None)  /hpf


 


U Tricyclic Antidepress  Detected H    (NotDetected)  














  09/27/22 Range/Units





  06:15 


 


RBC   (4.40-5.60)  X 10*6/uL


 


Hgb   (13.0-17.0)  g/dL


 


Hct   (39.6-50.0)  %


 


Lymphocytes #   (0.90-5.00)  X 10*3/uL


 


Monocytes #   (0.20-1.00)  X 10*3/uL


 


Eosinophils #   (0.04-0.35)  X 10*3/uL


 


Anion Gap  9.90 L  (10.00-18.00)  mmol/L


 


BUN/Creatinine Ratio  38.60 H  (12.00-20.00)  Ratio


 


Glucose  114 H  ()  mg/dL


 


Calcium  8.3 L  (8.7-10.3)  mg/dL


 


TSH   (0.350-5.500)  uIU/mL


 


Urine Protein   (Negative)  


 


Urine Blood   (Negative)  


 


Ur Leukocyte Esterase   (Negative)  


 


Urine RBC   (0-5)  /hpf


 


Urine WBC   (0-5)  /hpf


 


Urine WBC Clumps   (None)  /hpf


 


Calcium Oxalate Crystal   (None)  /hpf


 


Urine Bacteria   (None)  /hpf


 


U Tricyclic Antidepress   (NotDetected)  








                      Microbiology - Last 24 Hours (Table)











 09/26/22 08:22 Blood Culture - Preliminary





 Blood    No Growth after 24 hours


 


 09/26/22 08:19 Urine Culture - Preliminary





 Urine,Clean Catch 














Assessment and Plan


(1) Pressure ulcer of left heel, stage 2


Current Visit: Yes   Status: Acute   Code(s): L89.622 - PRESSURE ULCER OF LEFT 

HEEL, STAGE 2   SNOMED Code(s): 85304566413684


   





(2) Pressure ulcer of right heel, stage 2


Current Visit: Yes   Status: Acute   Code(s): L89.612 - PRESSURE ULCER OF RIGHT 

HEEL, STAGE 2   SNOMED Code(s): 05742604078668


   





(3) Non-pressure chronic ulcer of other part of left foot with fat layer exposed


Current Visit: Yes   Status: Acute   Code(s): L97.522 - NON-PRS CHRONIC ULCER 

OTH PRT LEFT FOOT W FAT LAYER EXPOSED   SNOMED Code(s): 504549668

## 2022-09-27 NOTE — XR
Left forearm, left hand

 

HISTORY: Tenderness, pain

 

Frontal and lateral views of the left forearm, 3 views the left hand submitted

 

There is arthropathy noted, lateral subluxation of the metacarpal phalangeal joints is consistent wit
h underlying rheumatoid arthritis, there is arthropathy also present at the interphalangeal joints, r
adiocarpal joint. Carpal metacarpal joint of the first digit also shows some hypertrophic change, sub
chondral sclerosis. No evident fracture or dislocation.

 

Positioning for the frontal view of the forearm somewhat limited. There is arthropathy present within
 the elbow joint, marginal spurring present, possible loose body noted on lateral view. No definite j
oint effusion. Vascular calcifications suspected along the volar aspect of the distal forearm.

 

IMPRESSION: Osteoarthritic changes are present, correlate for rheumatoid arthritis.

## 2022-09-28 NOTE — P.PN
Subjective


Progress Note Date: 09/28/22


The patient is seen at bedside and per nurse is confused.


It seems the patient went into A-fib with RVR








Objective





- Vital Signs


Vital signs: 


                                   Vital Signs











Temp  98.1 F   09/28/22 11:15


 


Pulse  96   09/28/22 11:27


 


Resp  16   09/28/22 11:15


 


BP  95/59   09/28/22 11:15


 


Pulse Ox  94 L  09/28/22 11:15


 


FiO2      








                                 Intake & Output











 09/27/22 09/28/22 09/28/22





 18:59 06:59 18:59


 


Intake Total 290  


 


Output Total  900 


 


Balance 290 -900 


 


Weight 58.967 kg  


 


Intake:   


 


  Intake, IV Titration 50  





  Amount   


 


    cefTRIAXone 1 gm In 50  





    Sodium Chloride 0.9% 50   





    ml @ 100 mls/hr IVPB ONCE   





    STA Rx#:[id number]   


 


  Oral 240  


 


Output:   


 


  Urine  900 


 


Other:   


 


  Voiding Method External Catheter  External Catheter


 


  # Bowel Movements   1














- Exam





GENERAL: The patient is lying in bed and is not in acute distress.





NEUROLOGICAL:


Limited because of his cooperation.


Higher mental function: The patient is severe drowsy but is briefly awakeable.  

Not responding or following commands..  


Cranial nerves: The pupils are round, equal and reactive to light.  No facial 

weakness.    Otherwise rest is limited.   


Motor: The strength is limited because of his cooperation. 


Cerebellum: Unable to assess.


Sensation: Unable to assess.


Reflexes (right/left): Unable to assess.


Plantars are mute bilaterally. 








Some other workup during this hospital visit consisted of:


AST and ALT is within normal limits


Ammonia level is less than 9


Vitamin B12 is 505


Serum folate is more than 20


TSH is 0.051 and the free T4 is 1.260


Urinalysis is suggestive of likely urinary tract infection


Urine tox screen is detected the positive for tricyclic antidepressant.


CT of the head is reported as no acute intracranial hemorrhage or midline shift.

 There is mild to moderate diffuse age-related cerebral atrophy and moderate to 

severe chronic small vessel ischemic changes redemonstrated and felt stable.  

Worsening acute on chronic bilateral maxillary and ethmoid sinus disease 

present, correlate clinically.  I personally reviewed the CT and agree with the 

finding of the report but in addition I felt the patient has a right frontal 

calcified likely meningioma.  And in the 2017 CT is reported as small calcified 

calcifying mass and interbody it is mentioned hemangioma or osteochondroma.  

I'll personally reviewed the CT 2017 I feel the size is about the same and it 

seems in the right frontal region as well.


Routine EEG it is reported as abnormal.  The background slowing suggestive of 

mild encephalopathy.  Otherwise there is no focal slowing, performed discharge 

or seizure on the EEG.











- Labs


CBC & Chem 7: 


                                 09/27/22 06:15





                                 09/28/22 10:17


Labs: 


                  Abnormal Lab Results - Last 24 Hours (Table)











  09/28/22 Range/Units





  10:17 


 


Sodium  134 L  (137-145)  mmol/L


 


Chloride  96 L  ()  mmol/L


 


BUN  28 H  (9-20)  mg/dL


 


Glucose  162 H  (74-99)  mg/dL








                      Microbiology - Last 24 Hours (Table)











 09/26/22 08:22 Blood Culture - Preliminary





 Blood    No Growth after 48 hours


 


 09/26/22 08:19 Urine Culture - Preliminary





 Urine,Clean Catch    Gram Neg Bacilli





    Group D Enterococcus


 


 09/26/22 16:54 Blood Culture - Preliminary





 Blood    No Growth after 24 hours














Assessment and Plan


Assessment: 





Questionable seizure activity at the [facility] facility (reported that upon trying to 

get up from his Kiki chair he became pale and developed uncontrolleable body 

shakes with drooling and leaning towards the right).  Possibly provoked due to 

underlying UTI


Encephalopathy due to underlying urinary tract infection


Acute on chronic urinary tract infection


Advanced dementia


History of stroke without residual deficits


Right frontal calcified mass unchanged since 2017 (one of differential is 

meningioma)


History of prostate cancer status post radiation seeds


Paroxysmal atrial fibrillation on eliquis





Plan: 





Will not start antiepileptic drugs since this was first episode of questionable 

possible seizure.  If he has another episode will start medication and daughter 

is in agreement.


Recommend orthostatic vitals once the patient is more stable.  


PT and OT are consulted


Defer the rest of the medical management to the primary team


Recommend the patient to be evaluated by a neurologist as an outpatient within 

1-2 weeks upon discharge


Patient CODE STATUS is DO NOT RESUSCITATE





Plan was discussed with the patient's daughter and his nurse.


Will follow-up with patient sporadically.





Shai Gamez M.D.


Neuro-hospital





Time with Patient: Less than 30

## 2022-09-28 NOTE — EEG
ELECTROENCEPHALOGRAM REPORT



CLINICAL HISTORY:

This is an 89-year-old gentleman, who presented because of reported 
uncontrollable

tremor at his C concerned for possible seizure.  The video EEG is obtained to

evaluate for seizure epileptiform activity.



RELEVANT MEDICATION:

The patient is not on any seizure medication.



EEG TYPE:

A routine 21-channel EEG is performed with video using the 10/20 electrode 
placement

system.



DESCRIPTION:

Wakefulness is only obtained.  During awake state, the background consists of 
low-to-

moderate voltage of 6 to 7 hertz activity that is well modulated, well 
sustained.

There is no physiological sleep architecture seen.



There is no focal slowing.



There is diffuse myogenic artifact that is mild-to-moderate in severity.



INTERICTAL AND ICTAL:

None.



ACTIVATION PROCEDURE:

Photic stimulation and hyperventilation are not performed.



CLINICAL INTERPRETATION:

This is an abnormal routine EEG.  The background slowing is suggestive of mild

encephalopathy.  Otherwise, there is no focal slowing, epileptiform discharge, 
or

seizure on the EEG.  Clinical correlation is recommended





ALEX / ROSALBAN: [id number] / Job#: [id number]

[facility]

## 2022-09-28 NOTE — P.CNPUL
History of Present Illness


Consult date: 09/28/22


Requesting physician: Tyron Hummel


Reason for consult: dyspnea


Chief complaint: Altered mental status, UTI


History of present illness: 





This is a very pleasant 89-year-old male patient with a known history of 

bacterial prostatitis with sepsis in August 2022, urinary retention secondary to

urethral strictures, bilateral foot ulcers, chronic paroxysmal atrial 

fibrillation, chronic renal failure, chronic anemia, mild persistent chronic 

bronchial asthma, rheumatoid arthritis, medical debility with dementia, hearing 

disorder.  He was admitted 09/26/2022 after being admitted for altered mental 

status shaking and drooling.  Computed tomography scan revealed no acute 

intracranial hemorrhage or midline shift.  Chest x-ray showed chronic 

emphysematous changes without acute pulmonary process.  He was found to have a 

urinary tract infection secondary to gram-negative bacilli.  Blood cultures re

veal no growth.  Earlier today he developed an acute episode of shortness of 

breath.  A. fib with RVR with a rate in the 180s, evidence of fluid volume 

overload and difficulty clearing his secretions.  We're consulted for the same. 

Presently he is resting fairly comfortably in bed.  Awake and alert in no acute 

distress.  He is maintaining good O2 saturations in the 90s on room air.  He's 

afebrile.  He did receive IV diuretics with increased urine output and has felt 

rather quick improvement.  He remains in atrial fibrillation with a controlled 

ventricular rhythm currently.  He is anticoagulated with Eliquis.  He's been on 

metoprolol 25 3 times a day.  He was initiated on antibiotics in the form of 

Zosyn.  He is given bronchodilators.





Review of Systems





REVIEW OF SYSTEMS:


CONSTITUTIONAL: Altered mental status.  Denies any recent significant weight 

loss or weight gain.


EYES: Denies change in vision.


EARS, NOSE, MOUTH, THROAT: Denies headaches, denies sore throat.


CARDIOVASCULAR: Positive for palpitations no syncopal episodes.


RESPIRATORY: Positive for shortness of breath, cough, congestion or hemoptysis.


GASTROINTESTINAL: Denies change in appetite, denies abdominal pain


GENITOURINARY: Denies hematuria, denies infections.


MUSKULOSKELETAL: Denies pain, denies swelling.


INTEGUMENTARY: Denies rash, denies eczema.


NEUROLOGICAL: Denies recent memory loss, no recent seizure activity. 


PSYCHIATRIC: Denies anxiety, denies depression.


HEMATOLOGIC/LYMPHATIC: Denies anemia, denies enlarged lymph nodes.








Past Medical History


Past Medical History: Atrial Fibrillation, Asthma, Cancer, CVA/TIA, Dementia, GI

Bleed, Prostate Disorder, Rheumatoid Arthritis (RA)


Additional Past Medical History / Comment(s): chronic back problems, Rt foot 

drop, Uses a walker, prostate cancer, radiation seeds; suspected cva 2017; 

covid; strictures


History of Any Multi-Drug Resistant Organisms: ESBL


Date of last positivie culture/infection: 01/01/2020


MDRO Source:: URINE ESBL


Past Surgical History: Back Surgery


Additional Past Surgical History / Comment(s): RECENT PICC LINE, NOW REMOVED, 

EVA CATARACTS, R hand surgery; laminectomy


Past Anesthesia/Blood Transfusion Reactions: No Reported Reaction


Past Psychological History: No Psychological Hx Reported


Smoking Status: Never smoker


Past Alcohol Use History: None Reported


Past Drug Use History: None Reported





- Past Family History


  ** Father


Family Medical History: No Reported History





  ** Mother


Family Medical History: GI Bleed





Medications and Allergies


                                Home Medications











 Medication  Instructions  Recorded  Confirmed  Type


 


Folic Acid 1 mg PO DAILY@1800 08/16/17 09/26/22 History


 


ALPRAZolam [Xanax] 0.5 mg PO BID PRN 08/09/20 09/26/22 History


 


Cyanocobalamin [Vitamin B-12] 500 mcg PO DAILY@0800 08/09/20 09/26/22 History


 


Donepezil [Aricept] 10 mg PO DAILY@0800 08/09/20 09/26/22 History


 


Ipratropium-Albuterol Nebulize 3 ml INHALATION RT-QID PRN 08/09/20 09/26/22 

History





[Duoneb 0.5 mg-3 mg/3 ml Soln]    


 


Pantoprazole [Protonix] 40 mg PO DAILY@0800 08/09/20 09/26/22 History


 


Apixaban [Eliquis] 2.5 mg PO BID@0800,1800 08/02/22 09/26/22 History


 


Cranberry Fruit Extract [Cranberry] 500 mg PO DAILY@0800 08/02/22 09/26/22 

History


 


Fluticasone/Vilanterol [Breo 1 puff INHALATION RT-DAILY@0800 08/02/22 09/26/22 

History





Ellipta 100-25 Mcg Inhaler]    


 


Magnesium Hydroxide [Milk of 2,400 mg PO Q48H PRN 08/02/22 09/26/22 History





Magnesia]    


 


Mirtazapine [Remeron] 30 mg PO HS@1800 08/02/22 09/26/22 History


 


Pravastatin Sodium [Pravachol] 40 mg PO HS@1800 08/02/22 09/26/22 History


 


Zinc Sulfate [Orazinc] 220 mg PO DAILY@0800 08/02/22 09/26/22 History


 


guaiFENesin SYRUP 100MG/5ML 200 mg PO Q6H PRN 08/02/22 09/26/22 History





[Robitussin]    


 


Artificial Tears-Hypromellose 2 drops BOTH EYES Q4HR PRN #1 ml 08/08/22 09/26/22

 Rx





[Artificial Tear Drops]    


 


QUEtiapine [SEROquel] 25 mg PO HS@1800 #30 tab 08/09/22 09/26/22 Rx


 


Ascorbic Acid [Vitamin C] 500 mg PO BID@0800,1800 09/26/22 09/26/22 History


 


Floranex Packet 1 packet PO DAILY@0800 09/26/22 09/26/22 History


 


Hyoscyamine Sulfate [Levsin-Sl] 0.125 mg SL Q4H PRN 09/26/22 09/26/22 History


 


LORazepam [Ativan] 0.5 mg PO Q4H PRN 09/26/22 09/26/22 History


 


MORPHINE ORAL SOL CONC 20mg/mL 5 mg PO Q4H PRN 09/26/22 09/26/22 History





[Roxanol Oral Soln Conc 20MG/ML]    


 


Magnesium Oxide [Mag-Ox] 400 mg PO W/BRKFST@0800 09/26/22 09/26/22 History


 


Metoprolol Tartrate [Lopressor] 25 mg PO BID@0800,2000 09/26/22 09/26/22 History


 


Permethrin 5% Cream [Elimite] 1 applic TOPICAL DAILY@0800 09/26/22 09/26/22 

History


 


Tamsulosin [Flomax] 0.4 mg PO PC-BRKFST@0800 09/26/22 09/26/22 History


 


bisacodyL [Dulcolax] 10 mg RECTAL Q72H PRN 09/26/22 09/26/22 History








                                    Allergies











Allergy/AdvReac Type Severity Reaction Status Date / Time


 


No Known Allergies Allergy   Verified 09/26/22 09:37














Physical Exam


Vitals: 


                                   Vital Signs











  Temp Pulse Pulse Resp BP BP Pulse Ox


 


 09/28/22 11:27   96     


 


 09/28/22 11:15  98.1 F  84  88  16  95/59   94 L


 


 09/28/22 05:55  99.3 F      


 


 09/28/22 05:49  99.5 F      


 


 09/28/22 04:43   111 H     


 


 09/28/22 04:34   90     


 


 09/28/22 04:22  102.1 F H   83  17   151/79  95


 


 09/27/22 20:14   90     


 


 09/27/22 19:33  99.0 F   65  15   128/64  98








                                Intake and Output











 09/27/22 09/28/22 09/28/22





 22:59 06:59 14:59


 


Intake Total 50  


 


Output Total  900 


 


Balance 50 -900 


 


Intake:   


 


  Intake, IV Titration 50  





  Amount   


 


    cefTRIAXone 1 gm In 50  





    Sodium Chloride 0.9% 50   





    ml @ 100 mls/hr IVPB ONCE   





    STA Rx#:[id number]   


 


Output:   


 


  Urine  900 


 


Other:   


 


  Voiding Method   External Catheter


 


  # Bowel Movements   1


 


  Weight 58.967 kg  














GENERAL EXAM: Alert, pleasant 89-year-old male patient, extremely hard of 

hearing, on 2 L nasal cannula, comfortable in no apparent distress.


HEAD: Normocephalic.


EYES: Normal reaction of pupils, equal size.


NOSE: Clear with pink turbinates.


THROAT: No erythema or exudates.


NECK: No masses, no JVD.


CHEST: No chest wall deformity.


LUNGS: Equal air entry with crackles in the posterior bases.


CVS: S1 and S2 normal with no audible murmur, irregular rhythm.


ABDOMEN: No hepatosplenomegaly, normal bowel sounds, no guarding or rigidity.


SPINE: No scoliosis or deformity


SKIN: No rashes


CENTRAL NERVOUS SYSTEM: No focal deficits, tone is normal in all 4 extremities.


EXTREMITIES: There is trace peripheral edema.  No clubbing, no cyanosis.  

Peripheral pulses are intact.





Results





- Laboratory Findings


CBC and BMP: 


                                 09/27/22 06:15





                                 09/28/22 10:17


PT/INR, D-dimer











PT  11.1 sec (9.0-12.0)   09/26/22  08:19    


 


INR  1.0  (<1.2)   09/26/22  08:19    








Abnormal lab findings: 


                                  Abnormal Labs











  09/26/22 09/26/22 09/26/22





  08:19 08:19 08:19


 


RBC  3.84 L  


 


Hgb  11.2 L  


 


Hct  34.9 L  


 


Lymphocytes #   


 


Monocytes #   


 


Eosinophils #   


 


Sodium   


 


Chloride   


 


Anion Gap   


 


BUN    29 H


 


Creatinine    0.62 L


 


BUN/Creatinine Ratio   


 


Glucose    110 H


 


Calcium   


 


Total Protein    6.2 L


 


Albumin    3.0 L


 


TSH   


 


Urine Protein   1+ H 


 


Urine Blood   Large H 


 


Ur Leukocyte Esterase   Large H 


 


Urine RBC   143 H 


 


Urine WBC   70 H 


 


Urine WBC Clumps   Rare H 


 


Calcium Oxalate Crystal   Rare H 


 


Urine Bacteria   Occasional H 


 


U Tricyclic Antidepress   Detected H 














  09/26/22 09/27/22 09/27/22





  16:54 06:15 06:15


 


RBC   3.52 L 


 


Hgb   10.4 L 


 


Hct   31.1 L 


 


Lymphocytes #   0.83 L 


 


Monocytes #   1.22 H 


 


Eosinophils #   0.03 L 


 


Sodium   


 


Chloride   


 


Anion Gap    9.90 L


 


BUN   


 


Creatinine   


 


BUN/Creatinine Ratio    38.60 H


 


Glucose    114 H


 


Calcium    8.3 L


 


Total Protein   


 


Albumin   


 


TSH  0.051 L  


 


Urine Protein   


 


Urine Blood   


 


Ur Leukocyte Esterase   


 


Urine RBC   


 


Urine WBC   


 


Urine WBC Clumps   


 


Calcium Oxalate Crystal   


 


Urine Bacteria   


 


U Tricyclic Antidepress   














  09/28/22





  10:17


 


RBC 


 


Hgb 


 


Hct 


 


Lymphocytes # 


 


Monocytes # 


 


Eosinophils # 


 


Sodium  134 L


 


Chloride  96 L


 


Anion Gap 


 


BUN  28 H


 


Creatinine 


 


BUN/Creatinine Ratio 


 


Glucose  162 H


 


Calcium 


 


Total Protein 


 


Albumin 


 


TSH 


 


Urine Protein 


 


Urine Blood 


 


Ur Leukocyte Esterase 


 


Urine RBC 


 


Urine WBC 


 


Urine WBC Clumps 


 


Calcium Oxalate Crystal 


 


Urine Bacteria 


 


U Tricyclic Antidepress 














- Diagnostic Findings


Chest x-ray: image reviewed





Assessment and Plan


Assessment: 





Atrial flutter ablation with a rapid ventricular response, anticoagulated with 

Eliquis





Acute hypoxemic respiratory failure secondary to fluid volume overload secondary

 to above





Altered mental status with metabolic encephalopathy secondary to urinary tract 

infection





Urinary tract infection





History of recurrent urinary tract infections with ESBL





History of prostate cancer status post radiation placement





History of CVA 2017





History of dementia





History of GI bleed





Rheumatoid arthritis





Plan:





The patient was seen and evaluated


Chest x-ray, labs and medications reviewed


Continue with IV diuretics


Continue metoprolol, add Cardizem


Titrate the FiO2 as tolerated


We will continue to follow and make further recommendations based on his 

clinical status





I have personally seen and examined the patient, performed the documentation and

 the assessment and plan as written.  Number of minutes spent on the visit: 20.

## 2022-09-28 NOTE — P.CONS
History of Present Illness





- Reason for Consult


Consult date: 09/27/22





- History of Present Illness


Patient is a 89-year-old  male with a past medical history significant 

for recurrent urinary tract infection patient presenting to the ER yesterday 

with concern for mental status changes at the nursing home symptom apparently 

has been going on for a day or 2 before presentation to the hospital on arrival 

to the ER patient did have a low-grade fever of 99.7 subsequently the patient di

d spike a fever of 101.3 F in this morning patient did have a normal white 

count kidney function has been normal liver exams are normal patient did have a 

positive UA with large leukocyte esterase 70 WBC patient was started on Rocephin

infectious disease was consulted as the patient do have a history of recurrent 

UTI and history of ESBL infection patient at this time is afebrile he seemed to 

be slightly more awake alert compared to last admission when specifically did 

not answer any question of any headache no vomiting diarrhea or any other 

changes were reported by the nursing staff








Past Medical History


Past Medical History: Atrial Fibrillation, Asthma, Cancer, CVA/TIA, Dementia, GI

Bleed, Prostate Disorder, Rheumatoid Arthritis (RA)


Additional Past Medical History / Comment(s): chronic back problems, Rt foot 

drop, Uses a walker, prostate cancer, radiation seeds; suspected cva 2017; 

covid; strictures


History of Any Multi-Drug Resistant Organisms: ESBL


Year Discovered:: 01/01/2020


MDRO Source:: URINE ESBL


Past Surgical History: Back Surgery


Additional Past Surgical History / Comment(s): RECENT PICC LINE, NOW REMOVED, 

EVA CATARACTS, R hand surgery; laminectomy


Past Anesthesia/Blood Transfusion Reactions: No Reported Reaction


Past Psychological History: No Psychological Hx Reported


Smoking Status: Never smoker


Past Alcohol Use History: None Reported


Past Drug Use History: None Reported





- Past Family History


  ** Father


Family Medical History: No Reported History





  ** Mother


Family Medical History: GI Bleed





Medications and Allergies


                                Home Medications











 Medication  Instructions  Recorded  Confirmed  Type


 


Folic Acid 1 mg PO DAILY@1800 08/16/17 09/26/22 History


 


ALPRAZolam [Xanax] 0.5 mg PO BID PRN 08/09/20 09/26/22 History


 


Cyanocobalamin [Vitamin B-12] 500 mcg PO DAILY@0800 08/09/20 09/26/22 History


 


Donepezil [Aricept] 10 mg PO DAILY@0800 08/09/20 09/26/22 History


 


Ipratropium-Albuterol Nebulize 3 ml INHALATION RT-QID PRN 08/09/20 09/26/22 

History





[Duoneb 0.5 mg-3 mg/3 ml Soln]    


 


Pantoprazole [Protonix] 40 mg PO DAILY@0800 08/09/20 09/26/22 History


 


Apixaban [Eliquis] 2.5 mg PO BID@0800,1800 08/02/22 09/26/22 History


 


Cranberry Fruit Extract [Cranberry] 500 mg PO DAILY@0800 08/02/22 09/26/22 

History


 


Fluticasone/Vilanterol [Breo 1 puff INHALATION RT-DAILY@0800 08/02/22 09/26/22 

History





Ellipta 100-25 Mcg Inhaler]    


 


Magnesium Hydroxide [Milk of 2,400 mg PO Q48H PRN 08/02/22 09/26/22 History





Magnesia]    


 


Mirtazapine [Remeron] 30 mg PO HS@1800 08/02/22 09/26/22 History


 


Pravastatin Sodium [Pravachol] 40 mg PO HS@1800 08/02/22 09/26/22 History


 


Zinc Sulfate [Orazinc] 220 mg PO DAILY@0800 08/02/22 09/26/22 History


 


guaiFENesin SYRUP 100MG/5ML 200 mg PO Q6H PRN 08/02/22 09/26/22 History





[Robitussin]    


 


Artificial Tears-Hypromellose 2 drops BOTH EYES Q4HR PRN #1 ml 08/08/22 09/26/22

Rx





[Artificial Tear Drops]    


 


QUEtiapine [SEROquel] 25 mg PO HS@1800 #30 tab 08/09/22 09/26/22 Rx


 


Ascorbic Acid [Vitamin C] 500 mg PO BID@0800,1800 09/26/22 09/26/22 History


 


Floranex Packet 1 packet PO DAILY@0800 09/26/22 09/26/22 History


 


Hyoscyamine Sulfate [Levsin-Sl] 0.125 mg SL Q4H PRN 09/26/22 09/26/22 History


 


LORazepam [Ativan] 0.5 mg PO Q4H PRN 09/26/22 09/26/22 History


 


MORPHINE ORAL SOL CONC 20mg/mL 5 mg PO Q4H PRN 09/26/22 09/26/22 History





[Roxanol Oral Soln Conc 20MG/ML]    


 


Magnesium Oxide [Mag-Ox] 400 mg PO W/BRKFST@0800 09/26/22 09/26/22 History


 


Metoprolol Tartrate [Lopressor] 25 mg PO BID@0800,2000 09/26/22 09/26/22 History


 


Permethrin 5% Cream [Elimite] 1 applic TOPICAL DAILY@0800 09/26/22 09/26/22 

History


 


Tamsulosin [Flomax] 0.4 mg PO PC-BRKFST@0800 09/26/22 09/26/22 History


 


bisacodyL [Dulcolax] 10 mg RECTAL Q72H PRN 09/26/22 09/26/22 History








                                    Allergies











Allergy/AdvReac Type Severity Reaction Status Date / Time


 


No Known Allergies Allergy   Verified 09/26/22 09:37














Physical Exam


Vitals: 


                                   Vital Signs











  Temp Pulse Pulse Resp BP BP BP


 


 09/27/22 08:45  99.1 F   62  15   108/61 


 


 09/27/22 05:28  99.2 F      


 


 09/27/22 04:10  101.3 F H   80  18    120/47


 


 09/26/22 22:30  98.3 F      


 


 09/26/22 21:39  100.3 F H   64  14    109/66


 


 09/26/22 14:49  99.7 F H   59 L  16   97/29 


 


 09/26/22 13:30   68   18  112/62  


 


 09/26/22 11:59   68   18  139/62  














  Pulse Ox


 


 09/27/22 08:45  96


 


 09/27/22 05:28 


 


 09/27/22 04:10  100


 


 09/26/22 22:30 


 


 09/26/22 21:39  100


 


 09/26/22 14:49  96


 


 09/26/22 13:30  95


 


 09/26/22 11:59  96








                                Intake and Output











 09/26/22 09/27/22 09/27/22





 22:59 06:59 14:59


 


Intake Total  410 240


 


Output Total 385 650 


 


Balance -385 -240 240


 


Intake:   


 


  Intake, IV Titration  50 





  Amount   


 


    cefTRIAXone 1 gm In  50 





    Sodium Chloride 0.9% 50   





    ml @ 100 mls/hr IVPB ONCE   





    STA Rx#:[id number]   


 


  Oral  360 240


 


Output:   


 


  Urine 300 650 


 


  Post Void Residual 85  


 


Other:   


 


  Voiding Method External Catheter  External Catheter


 


  # Bowel Movements 1 0 














Results


CBC & Chem 7: 


                                 09/27/22 06:15





                                 09/27/22 06:15


Labs: 


                  Abnormal Lab Results - Last 24 Hours (Table)











  09/26/22 09/26/22 09/27/22 Range/Units





  08:19 16:54 06:15 


 


RBC    3.52 L  (4.40-5.60)  X 10*6/uL


 


Hgb    10.4 L  (13.0-17.0)  g/dL


 


Hct    31.1 L  (39.6-50.0)  %


 


Lymphocytes #    0.83 L  (0.90-5.00)  X 10*3/uL


 


Monocytes #    1.22 H  (0.20-1.00)  X 10*3/uL


 


Eosinophils #    0.03 L  (0.04-0.35)  X 10*3/uL


 


Anion Gap     (10.00-18.00)  mmol/L


 


BUN/Creatinine Ratio     (12.00-20.00)  Ratio


 


Glucose     ()  mg/dL


 


Calcium     (8.7-10.3)  mg/dL


 


TSH   0.051 L   (0.350-5.500)  uIU/mL


 


Urine Protein  1+ H    (Negative)  


 


Urine Blood  Large H    (Negative)  


 


Ur Leukocyte Esterase  Large H    (Negative)  


 


Urine RBC  143 H    (0-5)  /hpf


 


Urine WBC  70 H    (0-5)  /hpf


 


Urine WBC Clumps  Rare H    (None)  /hpf


 


Calcium Oxalate Crystal  Rare H    (None)  /hpf


 


Urine Bacteria  Occasional H    (None)  /hpf


 


U Tricyclic Antidepress  Detected H    (NotDetected)  














  09/27/22 Range/Units





  06:15 


 


RBC   (4.40-5.60)  X 10*6/uL


 


Hgb   (13.0-17.0)  g/dL


 


Hct   (39.6-50.0)  %


 


Lymphocytes #   (0.90-5.00)  X 10*3/uL


 


Monocytes #   (0.20-1.00)  X 10*3/uL


 


Eosinophils #   (0.04-0.35)  X 10*3/uL


 


Anion Gap  9.90 L  (10.00-18.00)  mmol/L


 


BUN/Creatinine Ratio  38.60 H  (12.00-20.00)  Ratio


 


Glucose  114 H  ()  mg/dL


 


Calcium  8.3 L  (8.7-10.3)  mg/dL


 


TSH   (0.350-5.500)  uIU/mL


 


Urine Protein   (Negative)  


 


Urine Blood   (Negative)  


 


Ur Leukocyte Esterase   (Negative)  


 


Urine RBC   (0-5)  /hpf


 


Urine WBC   (0-5)  /hpf


 


Urine WBC Clumps   (None)  /hpf


 


Calcium Oxalate Crystal   (None)  /hpf


 


Urine Bacteria   (None)  /hpf


 


U Tricyclic Antidepress   (NotDetected)  








                      Microbiology - Last 24 Hours (Table)











 09/26/22 08:22 Blood Culture - Preliminary





 Blood    No Growth after 24 hours


 


 09/26/22 08:19 Urine Culture - Preliminary





 Urine,Clean Catch 














Assessment and Plan


Plan: 


1patient presented to hospital mental status changes likely multifactorial in 

this patient with a history of recurrent UTI with a positive UA likely urinary 

source patient seem to have grown multiple different pathogen in his urine 

culture however with resolution of the fever to Rocephin could be Rocephin sens

itive pathogen


2-continue with Rocephin while waiting for the culture to finalize


3-gentle IV fluid


We will follow on clinical condition and cultures to further adjust medication 

if needed


Thank you for this consultation will follow this patient along with you





Time with Patient: Greater than 30

## 2022-09-28 NOTE — XR
EXAMINATION TYPE: XR chest 1V portable

 

DATE OF EXAM: 9/28/2022

 

COMPARISON: 9/26/2022

 

HISTORY: Cough and congestion

 

TECHNIQUE: Single frontal view of the chest is obtained.

 

FINDINGS:  Soft tissue folds overlying both upper lungs. Biapical pleural thickening. Diffuse osteope
karen and arthropathy of the shoulders. Heart size normal. Hypertrophic change of the spine with degene
rative changes. Hyperinflation seen.

 

IMPRESSION:  

1. No acute intrathoracic process.

2. COPD.

## 2022-09-28 NOTE — P.CRDCN
History of Present Illness


Consult date: 09/28/22


History of present illness: 





HISTORY OF PRESENT ILLNESS:  





This is a 89-year-old male with a past medical history significant for 

paroxysmal atrial fibrillation, prostate cancer, recurrent UTIs secondary to 

urinary retention, CVA, and dementia. Patient follows in the office with Dr. Bustillos. We have been asked to see the patient in consultation for ALiya lagunas with RVR.

Patient examined at the bedside.  Patient is currently admitted to the hospital 

secondary to encephalopathy, possible seizure activity, and acute on chronic 

UTI.  The patient currently denies any chest pain or pressure.  Denies shortness

of breath.  Patient was febrile overnight with a temperature 102F.  Telemetry 

reveals atrial fibrillation with a heart rate ranging between [id number].





* Initial EKG reveals sinus bradycardia with a heart rate of 57.


* Chest xray no acute process.  COPD.


* Laboratory data: WBC 8.76.  Hemoglobin 10.4.  Platelet count 286.  Sodium 130s

  5.  Potassium 3.8.  BUN 25.  Creatinine 0.7.  Troponin negative 1.


* Current home cardiac medications include Pravachol 40 mg at night, metoprolol 

  tartrate 25 mg twice a day,Eliquis 2.5mg BID


* Most recent echocardiogram obtained in August 2022 revealed ejection fraction 

  50-55%, mild MR, trace TR





REVIEW OF SYSTEMS: 


At the time of my exam:


Unable to obtain thorough review of systems secondary to mental status





PHYSICAL EXAM: 


VITAL SIGNS: Reviewed.


GENERAL: Well-developed in no acute distress. 


HEENT: Head is normocephalic. Pupils are equal, round. Sclerae anicteric. Mucous

membranes of the mouth are moist. Neck supple. No JVD or thyromegaly


LUNGS: Respirations even and unlabored. Lungs with expiratory wheezing noted.


HEART: Tachycardia.  Irregular rate and rhythm.  S1 and S2 heard.


ABDOMEN: Soft. Nondistended. Nontender.


EXTREMITIES: Normal range of motion.  No clubbing or cyanosis.  Peripheral 

pulses intact.  No lower extremity edema


NEUROLOGIC: Awake and alert. Oriented x 1. 





ASSESSMENT: 


Acute on chronic UTI secondary to urinary retention


Acute encephalopathy


Fever


Paroxysmal atrial fibrillation with RVR


History of CVA


History of prostate cancer


Dementia





PLAN: 


No need to repeat echocardiogram


Continue current cardiac medications


Patient's metoprolol dose has been increased her internal medicine


Anticipate improvement in patients heart rate with increased metoprolol dose 

along with treatment of his underlying infectious process


We will sign off.  Please reconsult if needed.





Nurse practitioner note has been reviewed by physician. Signing provider agrees 

with the documented findings, assessment, and plan of care. 








Past Medical History


Past Medical History: Atrial Fibrillation, Asthma, Cancer, CVA/TIA, Dementia, GI

Bleed, Prostate Disorder, Rheumatoid Arthritis (RA)


Additional Past Medical History / Comment(s): chronic back problems, Rt foot 

drop, Uses a walker, prostate cancer, radiation seeds; suspected cva 2017; 

covid; strictures


History of Any Multi-Drug Resistant Organisms: ESBL


Date of last positivie culture/infection: 01/01/2020


MDRO Source:: URINE ESBL


Past Surgical History: Back Surgery


Additional Past Surgical History / Comment(s): RECENT PICC LINE, NOW REMOVED, 

EVA CATARACTS, R hand surgery; laminectomy


Past Anesthesia/Blood Transfusion Reactions: No Reported Reaction


Past Psychological History: No Psychological Hx Reported


Smoking Status: Never smoker


Past Alcohol Use History: None Reported


Past Drug Use History: None Reported





- Past Family History


  ** Father


Family Medical History: No Reported History





  ** Mother


Family Medical History: GI Bleed





Medications and Allergies


                                Home Medications











 Medication  Instructions  Recorded  Confirmed  Type


 


Folic Acid 1 mg PO DAILY@1800 08/16/17 09/26/22 History


 


ALPRAZolam [Xanax] 0.5 mg PO BID PRN 08/09/20 09/26/22 History


 


Cyanocobalamin [Vitamin B-12] 500 mcg PO DAILY@0800 08/09/20 09/26/22 History


 


Donepezil [Aricept] 10 mg PO DAILY@0800 08/09/20 09/26/22 History


 


Ipratropium-Albuterol Nebulize 3 ml INHALATION RT-QID PRN 08/09/20 09/26/22 Hist

ory





[Duoneb 0.5 mg-3 mg/3 ml Soln]    


 


Pantoprazole [Protonix] 40 mg PO DAILY@0800 08/09/20 09/26/22 History


 


Apixaban [Eliquis] 2.5 mg PO BID@0800,1800 08/02/22 09/26/22 History


 


Cranberry Fruit Extract [Cranberry] 500 mg PO DAILY@0800 08/02/22 09/26/22 

History


 


Fluticasone/Vilanterol [Breo 1 puff INHALATION RT-DAILY@0800 08/02/22 09/26/22 

History





Ellipta 100-25 Mcg Inhaler]    


 


Magnesium Hydroxide [Milk of 2,400 mg PO Q48H PRN 08/02/22 09/26/22 History





Magnesia]    


 


Mirtazapine [Remeron] 30 mg PO HS@1800 08/02/22 09/26/22 History


 


Pravastatin Sodium [Pravachol] 40 mg PO HS@1800 08/02/22 09/26/22 History


 


Zinc Sulfate [Orazinc] 220 mg PO DAILY@0800 08/02/22 09/26/22 History


 


guaiFENesin SYRUP 100MG/5ML 200 mg PO Q6H PRN 08/02/22 09/26/22 History





[Robitussin]    


 


Artificial Tears-Hypromellose 2 drops BOTH EYES Q4HR PRN #1 ml 08/08/22 09/26/22

 Rx





[Artificial Tear Drops]    


 


QUEtiapine [SEROquel] 25 mg PO HS@1800 #30 tab 08/09/22 09/26/22 Rx


 


Ascorbic Acid [Vitamin C] 500 mg PO BID@0800,1800 09/26/22 09/26/22 History


 


Floranex Packet 1 packet PO DAILY@0800 09/26/22 09/26/22 History


 


Hyoscyamine Sulfate [Levsin-Sl] 0.125 mg SL Q4H PRN 09/26/22 09/26/22 History


 


LORazepam [Ativan] 0.5 mg PO Q4H PRN 09/26/22 09/26/22 History


 


MORPHINE ORAL SOL CONC 20mg/mL 5 mg PO Q4H PRN 09/26/22 09/26/22 History





[Roxanol Oral Soln Conc 20MG/ML]    


 


Magnesium Oxide [Mag-Ox] 400 mg PO W/BRKFST@0800 09/26/22 09/26/22 History


 


Metoprolol Tartrate [Lopressor] 25 mg PO BID@0800,2000 09/26/22 09/26/22 History


 


Permethrin 5% Cream [Elimite] 1 applic TOPICAL DAILY@0800 09/26/22 09/26/22 

History


 


Tamsulosin [Flomax] 0.4 mg PO PC-BRKFST@0800 09/26/22 09/26/22 History


 


bisacodyL [Dulcolax] 10 mg RECTAL Q72H PRN 09/26/22 09/26/22 History








                                    Allergies











Allergy/AdvReac Type Severity Reaction Status Date / Time


 


No Known Allergies Allergy   Verified 09/26/22 09:37














Physical Exam


Vitals: 


                                   Vital Signs











  Temp Pulse Pulse Resp BP Pulse Ox


 


 09/28/22 05:55  99.3 F     


 


 09/28/22 05:49  99.5 F     


 


 09/28/22 04:43   111 H    


 


 09/28/22 04:34   90    


 


 09/28/22 04:22  102.1 F H   83  17  151/79  95


 


 09/27/22 20:14   90    


 


 09/27/22 19:33  99.0 F   65  15  128/64  98


 


 09/27/22 11:43  98.7 F   65  16  116/62  97








                                Intake and Output











 09/27/22 09/28/22 09/28/22





 22:59 06:59 14:59


 


Intake Total 50  


 


Output Total  900 


 


Balance 50 -900 


 


Intake:   


 


  Intake, IV Titration 50  





  Amount   


 


    cefTRIAXone 1 gm In 50  





    Sodium Chloride 0.9% 50   





    ml @ 100 mls/hr IVPB ONCE   





    STA Rx#:[id number]   


 


Output:   


 


  Urine  900 


 


Other:   


 


  Voiding Method   External Catheter


 


  # Bowel Movements   1


 


  Weight 58.967 kg  














Results





                                 09/27/22 06:15





                                 09/27/22 06:15


                               Current Medications











Generic Name Dose Route Start Last Admin





  Trade Name Freq  PRN Reason Stop Dose Admin


 


Acetaminophen  650 mg  09/26/22 23:09  09/27/22 04:04





  Acetaminophen Tab 325 Mg Tab  PO   650 mg





  Q6HR PRN   Administration





  Fever and/ or Pain  


 


Albuterol/Ipratropium  3 ml  09/28/22 12:00 





  Ipratropium-Albuterol 3 Ml Neb  INHALATION  





  RT-QID SRIDHAR  


 


Albuterol/Ipratropium  3 ml  09/28/22 08:33 





  Ipratropium-Albuterol 3 Ml Neb  INHALATION  





  RT-Q2H PRN  





  Shortness Of Breath Or Wheezing  


 


Alprazolam  0.5 mg  09/26/22 10:35 





  Alprazolam 0.5 Mg Tab  PO  





  BID PRN  





  Anxiety  


 


Apixaban  2.5 mg  09/26/22 10:40  09/28/22 08:10





  Apixaban 2.5 Mg Tablet  PO   2.5 mg





  BID@0800,1800 [facility]   Administration





  Protocol  


 


Artificial Tears  2 drops  09/26/22 14:00 





  Artificial Tears-Hypromellose Drops 15 Ml Btl  BOTH EYES  





  Q4HR PRN  





  Dry Eye(s)  


 


Ascorbic Acid  500 mg  09/26/22 18:00  09/28/22 08:10





  Ascorbic Acid 500 Mg Tab  PO   500 mg





  BID@0800,1800 SRIDHAR   Administration


 


Budesonide  0.5 mg  09/28/22 09:00 





  Budesonide 0.5 Mg/2 Ml Nebu  INHALATION  





  RT-BID SRIDHAR  


 


Collagenase  1 applic  09/27/22 09:00  09/28/22 08:11





  Collagenase 250 Unit/Gm Ointment 30 Gm Tube  TOPICAL   1 applic





  DAILY SRIDHAR   Administration





  Protocol  


 


Cyanocobalamin  500 mcg  09/27/22 08:00  09/28/22 08:10





  Cyanocobalamin 500 Mcg Tab  PO   500 mcg





  DAILY@0800 SRIDHAR   Administration


 


Donepezil HCl  10 mg  09/27/22 08:00  09/28/22 08:10





  Donepezil 10 Mg Tab  PO   10 mg





  DAILY@0800 SRIDHAR   Administration


 


Folic Acid  1 mg  09/26/22 18:00  09/27/22 18:04





  Folic Acid 1 Mg Tab  PO   1 mg





  DAILY@1800 SRIDHAR   Administration


 


Piperacillin Sod/Tazobactam  100 mls @ 25 mls/hr  09/28/22 08:00  09/28/22 08:09





  Sod 3.375 gm/ Sodium Chloride  IVPB   25 mls/hr





  Q8HR SRIDHAR   Administration





  Protocol  


 


Magnesium Hydroxide  2,400 mg  09/27/22 09:00 





  Magnesium Hydroxide 2,400 Mg/10 Ml Cup  PO  





  Q48H PRN  





  Constipation  


 


Magnesium Oxide  400 mg  09/27/22 08:00  09/28/22 08:10





  Magnesium Oxide 400 Mg Tab  PO   400 mg





  W/BRKFST@0800 SRIHDAR   Administration


 


Metoprolol Tartrate  25 mg  09/28/22 12:00 





  Metoprolol Tartrate 25 Mg Tab  PO  





  TID SRIDHAR  


 


Mirtazapine  30 mg  09/26/22 21:00  09/27/22 21:54





  Mirtazapine 15 Mg Tab  PO   30 mg





  HS@2100 SRIDHAR   Administration


 


Naloxone HCl  0.2 mg  09/26/22 11:50 





  Naloxone 0.4 Mg/Ml 1 Ml Vial  IV  





  Q2M PRN  





  Opioid Reversal  


 


Pantoprazole Sodium  40 mg  09/26/22 10:45  09/28/22 08:09





  Pantoprazole 40 Mg/10 Ml Vial  IVP   40 mg





  DAILY SRIDHAR   Administration


 


Pravastatin Sodium  40 mg  09/26/22 21:00  09/27/22 21:55





  Pravastatin Sodium 40 Mg Tab  PO   40 mg





  HS@2100 SRIDHAR   Administration


 


Quetiapine Fumarate  25 mg  09/26/22 18:00  09/27/22 18:04





  Quetiapine 25 Mg Tab  PO   25 mg





  HS@1800 SRIDHAR   Administration


 


Tamsulosin HCl  0.4 mg  09/27/22 08:00  09/28/22 08:10





  Tamsulosin 0.4 Mg Cap.Er.24h  PO   0.4 mg





  PC-BRKFST@0800 [facility]   Administration


 


Zinc Sulfate  220 mg  09/27/22 08:00  09/28/22 08:10





  Zinc Sulfate 220 Mg Cap  PO   220 mg





  DAILY@0800 [facility]   Administration








                                Intake and Output











 09/27/22 09/28/22 09/28/22





 22:59 06:59 14:59


 


Intake Total 50  


 


Output Total  900 


 


Balance 50 -900 


 


Intake:   


 


  Intake, IV Titration 50  





  Amount   


 


    cefTRIAXone 1 gm In 50  





    Sodium Chloride 0.9% 50   





    ml @ 100 mls/hr IVPB ONCE   





    STA Rx#:[id number]   


 


Output:   


 


  Urine  900 


 


Other:   


 


  Voiding Method   External Catheter


 


  # Bowel Movements   1


 


  Weight 58.967 kg  








                                        





                                 09/27/22 06:15 





                                 09/27/22 06:15

## 2022-09-28 NOTE — US
EXAMINATION TYPE: US arterial LE multi level

 

DATE OF EXAM: 9/27/2022 3:14 PM

 

CLINICAL HISTORY: non-healing ulcer eric. heels.

Confused patient unable to give history. Limited exam done due to patient motion and inability to hol
d still.

 

Doppler Waveforms: 

Right: Unable to obtain waveforms except at ankle due to patient cooperation

Left: Unable to obtain waveforms except at ankle due to patient cooperation

 

 

 

Ankle-Brachial Indices:

Right: CNO

Left: 1.50

 

Toe Brachial Indices:

Right: Unable to obtain due to patient motion

Left: Unable to obtain waveforms except at ankle due to patient cooperation

 

 

 

IMPRESSION:  Nondiagnostic study.

## 2022-09-28 NOTE — P.PN
Subjective


Progress Note Date: 09/28/22


H&P Date: 09/26/22


Chief Complaint: Since shaking, drooling, leaning to the right with accompanied 

labored br





This is a 84-year-old  gentleman with known history of paroxysmal 

atrial fibrillation, follows with Dr. Bustillos cardiologist, asthma, rheumatoid 

arthritis, history of prostate cancer status post radiation seeds, recurrent 

UTIs secondary to urinary retention related to urethral stricture lending to 

difficult catheter insertion/coude', history of cystoscopy with urethral 

dilations with , Urology, history of CVA with no residuals and multiple 

other medical issues sent to the ER from [facility].  Staff reports that 

upon getting patient up in his Kiki chair this morning before while down for 

breakfast, patient became pale ,developed uncontrollable shakes, accompanied by 

drooling, and proceeded to lean to the right into the staff member, developing 

increased shortness of breath.  Did not lose consciousness.  No nausea vomiting 

or diarrhea. Prior to procedure patient had been talking, post procedure patient

not conversing.  Shakiness resolved prior to EMS arrival.  No syncope.  Further 

EMS records currently unavailable. Glucose on admission 110.  EKG reporting 

sinus bradycardia, ventricular rate 57, brain CT no acute intracranial 

hemorrhage or midline shift, mild to moderate diffuse atrial agent cerebral 

atrophy and moderate to severe chronic small vessel ischemic changes 

redemonstrated and felt stable, worsening acute on chronic bilateral maxillary 

and ethmoid sinus disease.  Chest x-ray reported chronic emphysematous changes 

without acute pulmonary process, no significant change from prior.  Afebrile, 

WBC 8.4, hemoglobin 11.2, platelets 290, INR 1, sodium 137, potassium 4.2, 

bicarb 25, BUN 29, creatinine 0.62, troponin negative 1, total protein and 

albumin low at 6.2,3.  UA reporting tricyclic antidepressants.  Bladder scan 

reported 150, staff unable to straight cath secondary to patient has known his

tory of significant urethral stricture requiring catheter placement as per 

urology.  At bedside patient was able to answer simple yes and no questions at 

times, extremely hard of hearing.  Earlier presented with mild expressive 

aphasia per daughter.








09/27/2022 no further shakiness, no seizure activity reported. Evaluated by 

neurology, workup in progress, labs/EEG pending. Brain CT suggested worsening 

acute on chronic bilateral maxillary and ethmoid sinus disease, asymptomatic 

clinically.  Maintained on IV antibiotics.  UA reported rare WBC,, 70 WBCs, 

large leukocytes, negative nitrates, culture , preliminary blood culture 

reported no growth after 24 hours .  Wound dressing changed last night with no 

drainage reported. T-max 101.3, WBC 8.76.





09/28/2022 T-max 102.1.  Urine culture reporting gram-negative bacilli, group D 

enterococcus, IV antibiotics adjusted to Zosyn.  In the early morning hours 

developed atrial fibrillation with RVR with heart rates reported up into the 

180s, audible gurgling with fluid overload-difficulty clearing secretions, with 

continuous nonproductive coughing, received additional metoprolol, Lasix.  Labs 

pending.  Chest x-ray pending.  EEG reported as abnormal, across slowing 

suggestive of healed encephalopathy, no focal slowing,no elipeptiform discharge,

no seizure activity.





Objective





- Vital Signs


Vital signs: 


                                   Vital Signs











Temp  98.1 F   09/28/22 11:15


 


Pulse  92   09/28/22 16:27


 


Resp  16   09/28/22 11:15


 


BP  95/59   09/28/22 11:15


 


Pulse Ox  94 L  09/28/22 11:15


 


FiO2      








                                 Intake & Output











 09/27/22 09/28/22 09/28/22





 18:59 06:59 18:59


 


Intake Total 290  


 


Output Total  900 900


 


Balance 290 -900 -900


 


Weight 58.967 kg  


 


Intake:   


 


  Intake, IV Titration 50  





  Amount   


 


    cefTRIAXone 1 gm In 50  





    Sodium Chloride 0.9% 50   





    ml @ 100 mls/hr IVPB ONCE   





    STA Rx#:[id number]   


 


  Oral 240  


 


Output:   


 


  Urine  900 900


 


Other:   


 


  Voiding Method External Catheter  External Catheter


 


  # Bowel Movements   1














- Exam


PHYSICAL EXAM:


VITAL SIGNS: As above


GENERAL: Sitting up in bed, no acute distress. Confused, alert and oriented 

1.[place].


HEENT: Atraumatic, normocephalic , pupils round and equal ,Conjunctivae normal.


NECK:  No JVD. No thyroid enlargement. No LNs


CARDIOVASCULAR:  S1, S2 regular. Systolic murmur


RESPIRATION: Breath sounds diminished in the bases.  Bibasilar crackles.


ABDOMEN:  Soft, nontender, nondistended . No guarding. no masses palpable.  

Possible Bowel sounds.


Extremities: Bilateral Dupuytren's contractures, history of right hand surgery, 

Left tip of great toe, bilateral heel and left great toe dressings clean dry and

intact, wearing offloading boots, minimal edema.


NERVOUS SYSTEM: Limited exam. Cranial N 2-12 grossly normal.


Skin: Warm and dry, no rash.  





- Labs


CBC & Chem 7: 


                                 09/27/22 06:15





                                 09/28/22 10:17


Labs: 


                  Abnormal Lab Results - Last 24 Hours (Table)











  09/28/22 Range/Units





  10:17 


 


Sodium  134 L  (137-145)  mmol/L


 


Chloride  96 L  ()  mmol/L


 


BUN  28 H  (9-20)  mg/dL


 


Glucose  162 H  (74-99)  mg/dL








                      Microbiology - Last 24 Hours (Table)











 09/26/22 08:22 Blood Culture - Preliminary





 Blood    No Growth after 48 hours


 


 09/26/22 08:19 Urine Culture - Preliminary





 Urine,Clean Catch    Gram Neg Bacilli





    Group D Enterococcus


 


 09/26/22 16:54 Blood Culture - Preliminary





 Blood    No Growth after 24 hours














Assessment and Plan


Assessment: 


Acute metabolic encephalopathy secondary to suspected acute on chronic UTI 

related to urinary retention, in a patient with history of ESBL,  prostate canc

er with radiation seeds , urinary retention.





Acute on chronic paroxysmal A. fib with RVR , follows with cardiologist, Dr. Bustillos 





Possible acute seizure activity, per [facility] report , no further seizure activity 

reported; suspect symptoms secondary to possibly hypoglycemic episode though 

blood sugar normal on admission, possible secondary to infection, UTI, possibly 

orthostatic hypotension.  No seizure activity reported per EEG.





Recently discharged on 08/08/2022 with sepsis secondary to bacterial prostatitis

with suspected acute UTI, urine and blood cultures that time failed to report 

any growth, despite fevers, elevated WBC and pro-calcitonin.





History of Urinary retention secondary to urethral stricture lending to 

difficult catheter insertion/coude', history of cystoscopy with urethral dila

tions with , Urology.  Status post dilation and placement of 12-[place] 

coud tip catheter per urology on prior admission with Anderson catheter 

discontinued 08/08/2022.





Bilateral heel -stage II and left great toe ulcers, nonpressure-unstageable 

,present on admission; upon discharge-patient had sustained bilateral heel & 

left great toe blisters that later transitioned to wounds.  Debrided at wound 

care center on 9/22/22.  (Offloading boots with heels to be floating at all 

times)





Hypoalbuminemia





Chronic renal failure, stage II, secondary to chronic urinary retention





Chronic anemia secondary to the above





Chronic mild persistent bronchial asthma, stable





History of high right frontal meningioma , suspected CVA, no residuals.





History of rheumatoid arthritis, treated in the past, not on immunosuppressants





History of lumbar degenerative disc disease, spinal stenosis, L5 spondylosis, 

facet arthropathy, L3-4 laminectomy





Risk secondary to increased medical debility, increased weakness ( 2 person 

assist), dementia in a patient with prior HX of Gait dysfunction, bilateral foot

drop-greatest in the right.





Underlying dementia, advanced





[place]

















Plan: Continue on current medication regime ,monitoring and symptomatic 

treatment.  Labs pending. Maintained on IV antibiotics.  Beta blocker increased.

Evaluated by pulmonary with Cardizem added to med regimen.  Evaluated by 

cardiology with no additional recommendations and they have signed off.Prognosis

guarded given multiple complex medical issues.











The impression and plan of care has been dictated as directed.





:


I performed a history and examination of this patient,  discussed the same with 

the dictator.  I agree with the dictator's note ,documented as a scribe.  Any 

additional findings or plans will be noted.

## 2022-09-29 NOTE — P.PN
Subjective


Progress Note Date: 09/29/22


H&P Date: 09/26/22


Chief Complaint: Since shaking, drooling, leaning to the right with accompanied 

labored br





This is a 84-year-old  gentleman with known history of paroxysmal 

atrial fibrillation, follows with Dr. Bustillos cardiologist, asthma, rheumatoid 

arthritis, history of prostate cancer status post radiation seeds, recurrent 

UTIs secondary to urinary retention related to urethral stricture lending to 

difficult catheter insertion/coude', history of cystoscopy with urethral 

dilations with , Urology, history of CVA with no residuals and multiple 

other medical issues sent to the ER from [facility].  Staff reports that 

upon getting patient up in his Kiki chair this morning before while down for 

breakfast, patient became pale ,developed uncontrollable shakes, accompanied by 

drooling, and proceeded to lean to the right into the staff member, developing 

increased shortness of breath.  Did not lose consciousness.  No nausea vomiting 

or diarrhea. Prior to procedure patient had been talking, post procedure patient

not conversing.  Shakiness resolved prior to EMS arrival.  No syncope.  Further 

EMS records currently unavailable. Glucose on admission 110.  EKG reporting 

sinus bradycardia, ventricular rate 57, brain CT no acute intracranial 

hemorrhage or midline shift, mild to moderate diffuse atrial agent cerebral 

atrophy and moderate to severe chronic small vessel ischemic changes 

redemonstrated and felt stable, worsening acute on chronic bilateral maxillary 

and ethmoid sinus disease.  Chest x-ray reported chronic emphysematous changes 

without acute pulmonary process, no significant change from prior.  Afebrile, 

WBC 8.4, hemoglobin 11.2, platelets 290, INR 1, sodium 137, potassium 4.2, 

bicarb 25, BUN 29, creatinine 0.62, troponin negative 1, total protein and 

albumin low at 6.2,3.  UA reporting tricyclic antidepressants.  Bladder scan 

reported 150, staff unable to straight cath secondary to patient has known his

tory of significant urethral stricture requiring catheter placement as per 

urology.  At bedside patient was able to answer simple yes and no questions at 

times, extremely hard of hearing.  Earlier presented with mild expressive 

aphasia per daughter.








09/27/2022 no further shakiness, no seizure activity reported. Evaluated by 

neurology, workup in progress, labs/EEG pending. Brain CT suggested worsening 

acute on chronic bilateral maxillary and ethmoid sinus disease, asymptomatic 

clinically.  Maintained on IV antibiotics.  UA reported rare WBC,, 70 WBCs, 

large leukocytes, negative nitrates, culture , preliminary blood culture 

reported no growth after 24 hours .  Wound dressing changed last night with no 

drainage reported. T-max 101.3, WBC 8.76.





09/28/2022 T-max 102.1.  Urine culture reporting gram-negative bacilli, group D 

enterococcus, IV antibiotics adjusted to Zosyn.  In the early morning hours 

developed atrial fibrillation with RVR with heart rates reported up into the 

180s, audible gurgling with fluid overload-difficulty clearing secretions, with 

continuous nonproductive coughing, received additional metoprolol, Lasix.  Labs 

pending.  Chest x-ray pending.  EEG reported as abnormal, across slowing 

suggestive of healed encephalopathy, no focal slowing,no elipeptiform discharge,

no seizure activity.








09/29/2022 Yesterday throughout the day, ongoing A. fib with RVR, desaturating 

on supplemental O2-merely maintaining O2 sats of 90-91%, worsening renal 

function and soft blood pressure. oral amiodarone initiated yesterday afternoon.

 Rate better controlled this morning.  Urine culture reporting pseudomonas 

aeruginosa and enterococcus faecalis, preliminary blood cultures reporting no 

growth.  Maintained on Zosyn. T-max 99.5, WBC 17.5 .  Hemoglobin 12.2, platelets

292 .  Electrolytes within normal limits .  BUN 45, creatinine 1.1. Much more 

alert.  Sitting up in bed, conversing, smiling and eating.  Maintaining O2 sats 

in the high 90s on room air.





Objective





- Vital Signs


Vital signs: 


                                   Vital Signs











Temp  97.8 F   09/29/22 11:11


 


Pulse  88   09/29/22 15:32


 


Resp  18   09/29/22 11:11


 


BP  102/66   09/29/22 11:11


 


Pulse Ox  97   09/29/22 15:18


 


FiO2      








                                 Intake & Output











 09/28/22 09/29/22 09/29/22





 18:59 06:59 18:59


 


Intake Total 540 240 


 


Output Total 900 200 


 


Balance -360 40 


 


Intake:   


 


  Intake, IV Titration 300  





  Amount   


 


    Magnesium Sulfate-D5w Pmx 100  





    1 gm In Dextrose/Water 1   





    100ml.bag @ 100 mls/hr   





    IVPB ONCE ONE Rx#:   





    [id number]   


 


    Piperacillin-Tazobactam 3 200  





    .375 gm In Sodium   





    Chloride 0.9% 100 ml @ 25   





    mls/hr IVPB Q8HR [facility] Rx#   





    :[id number]   


 


  Oral 240 240 


 


Output:   


 


  Urine 900 200 


 


Other:   


 


  Voiding Method External Catheter External Catheter External Catheter


 


  # Bowel Movements 1  1














- Exam


PHYSICAL EXAM:


VITAL SIGNS: As above


GENERAL: Sitting up in bed, no acute distress.  Pleasantly Confused, alert and 

oriented 1.[place].


HEENT: Atraumatic, normocephalic , pupils round and equal ,Conjunctivae normal.


NECK:  No JVD. No thyroid enlargement. No LNs


CARDIOVASCULAR:  S1, S2 regular. Systolic murmur


RESPIRATION: Breath sounds diminished in the bases.  Fine Bibasilar crackles.


ABDOMEN:  Soft, nontender, nondistended . No guarding. no masses palpable.  

Possible Bowel sounds.


Extremities: Bilateral Dupuytren's contractures, history of right hand surgery, 

Left tip of great toe, bilateral heel and left great toe dressings clean dry and

intact, wearing offloading boots, trace edema.


NERVOUS SYSTEM: Limited exam. Cranial N 2-12 grossly normal.


Skin: Warm and dry, no rash.  











                                  Microbiology





09/26/22 08:22   Blood   Blood Culture - Preliminary


                            No Growth after 72 hours


09/26/22 08:19   Urine,Clean Catch   Urine Culture - Final


                            Pseudomonas aeruginosa


                            Enterococcus faecalis


09/26/22 16:54   Blood   Blood Culture - Preliminary


                            No Growth after 48 hours











- Labs


CBC & Chem 7: 


                                 09/29/22 05:56





                                 09/29/22 05:56


Labs: 


                  Abnormal Lab Results - Last 24 Hours (Table)











  09/29/22 09/29/22 Range/Units





  05:56 05:56 


 


WBC   17.56 H  (4.50-10.00)  X 10*3/uL


 


RBC   4.11 L  (4.40-5.60)  X 10*6/uL


 


Hgb   12.2 L  (13.0-17.0)  g/dL


 


Hct   36.0 L  (39.6-50.0)  %


 


Immature Gran #   0.08 H  (0.00-0.04)  X 10*3/uL


 


Neutrophils #   14.95 H  (1.80-7.70)  X 10*3/uL


 


Monocytes #   1.54 H  (0.20-1.00)  X 10*3/uL


 


Eosinophils #   0 L  (0.04-0.35)  X 10*3/uL


 


BUN  45.0 H   (9.0-27.0)  mg/dL


 


Est GFR (CKD-EPI)NonAf  57.3 L   (60.0-200.0)   


 


BUN/Creatinine Ratio  39.82 H   (12.00-20.00)  Ratio


 


Glucose  174 H   ()  mg/dL


 


Magnesium  2.5 H   (1.5-2.4)  mg/dL








                      Microbiology - Last 24 Hours (Table)











 09/26/22 08:22 Blood Culture - Preliminary





 Blood    No Growth after 72 hours


 


 09/26/22 08:19 Urine Culture - Final





 Urine,Clean Catch    Pseudomonas aeruginosa





    Enterococcus faecalis


 


 09/26/22 16:54 Blood Culture - Preliminary





 Blood    No Growth after 48 hours














Assessment and Plan


Assessment: 


Acute metabolic encephalopathy secondary to suspected acute on chronic UTI 

related to urinary retention, in a patient with history of ESBL,  prostate cance

r with radiation seeds , urinary retention.





Acute UTI with pseudomonas aeruginosa, enterococcus faecalis





Acute on chronic paroxysmal A. fib with RVR , follows with cardiologist, Dr. Bustillos 





Possible acute seizure activity, per [facility] report , no further seizure activity 

reported; suspect symptoms secondary to possibly hypoglycemic episode though 

blood sugar normal on admission, possible secondary to infection, UTI, possibly 

orthostatic hypotension.  No seizure activity reported per EEG.





Recently discharged on 08/08/2022 with sepsis secondary to bacterial prostatitis

with suspected acute UTI, urine and blood cultures that time failed to report 

any growth, despite fevers, elevated WBC and pro-calcitonin.





History of Urinary retention secondary to urethral stricture lending to 

difficult catheter insertion/coude', history of cystoscopy with urethral 

dilations with , Urology.  Status post dilation and placement of 12-

Swedish coud tip catheter per urology on prior admission with Anderson catheter 

discontinued 08/08/2022.





Bilateral heel -stage II and left great toe ulcers, nonpressure-unstageable 

,present on admission; upon discharge-patient had sustained bilateral heel & 

left great toe blisters that later transitioned to wounds.  Debrided at wound 

care center on 9/22/22.  (Offloading boots with heels to be floating at all 

times)





Hypoalbuminemia





Chronic renal failure, stage II, secondary to chronic urinary retention





Chronic anemia secondary to the above





Chronic mild persistent bronchial asthma, stable





History of high right frontal meningioma , suspected CVA, no residuals.





History of rheumatoid arthritis, treated in the past, not on immunosuppressants





History of lumbar degenerative disc disease, spinal stenosis, L5 spondylosis, 

facet arthropathy, L3-4 laminectomy





Risk secondary to increased medical debility, increased weakness ( 2 person 

assist), dementia in a patient with prior HX of Gait dysfunction, bilateral foot

drop-greatest in the right.





Underlying dementia, advanced





[place]

















Plan: Continue on current medication regime ,monitoring and symptomatic freya

atment.  Aggressive pulmonary toileting with nebulized bronchodilators.  

Continue IV antibiotics as per ID .Maintain amiodarone, Lopressor.   Evaluated 

by pulmonary with recommendations noted and appreciated.  Close monitoring of 

renal function with repeat labs ordered for a.m. Prognosis guarded given 

multiple complex medical issues.











The impression and plan of care has been dictated as directed.





:


I performed a history and examination of this patient,  discussed the same with 

the dictator.  I agree with the dictator's note ,documented as a scribe.  Any 

additional findings or plans will be noted.

## 2022-09-29 NOTE — P.PN
Subjective


Progress Note Date: 09/29/22





This is a very pleasant 89-year-old male patient with a known history of 

bacterial prostatitis with sepsis in August 2022, urinary retention secondary to

urethral strictures, bilateral foot ulcers, chronic paroxysmal atrial 

fibrillation, chronic renal failure, chronic anemia, mild persistent chronic 

bronchial asthma, rheumatoid arthritis, medical debility with dementia, hearing 

disorder.  He was admitted 09/26/2022 after being admitted for altered mental 

status shaking and drooling.  Computed tomography scan revealed no acute 

intracranial hemorrhage or midline shift.  Chest x-ray showed chronic emph

ysematous changes without acute pulmonary process.  He was found to have a 

urinary tract infection secondary to gram-negative bacilli.  Blood cultures 

reveal no growth.  Earlier today he developed an acute episode of shortness of 

breath.  A. fib with RVR with a rate in the 180s, evidence of fluid volume 

overload and difficulty clearing his secretions.  We're consulted for the same. 

Presently he is resting fairly comfortably in bed.  Awake and alert in no acute 

distress.  He is maintaining good O2 saturations in the 90s on room air.  He's 

afebrile.  He did receive IV diuretics with increased urine output and has felt 

rather quick improvement.  He remains in atrial fibrillation with a controlled 

ventricular rhythm currently.  He is anticoagulated with Eliquis.  He's been on 

metoprolol 25 3 times a day.  He was initiated on antibiotics in the form of 

Zosyn.  He is given bronchodilators.





The patient is seen today 09/29/2022 in follow-up on the regular medical floor. 

He is currently resting fairly comfortably in bed.  Awake and alert in no acute 

distress.  He did have ongoing issues with atrial fibrillation with rapid 

ventricular response.  He was initiated on oral amiodarone.  His rate is better 

controlled today.  He is anticoagulated with Eliquis.  He is maintaining O2 

saturation in the 90s on 2 L/m per nasal cannula.  Urine culture is positive for

pseudomonas aeruginosa is and enterococcus faecalis.  Blood cultures reveal no 

growth.  White count 17.5.  Hemoglobin 12.2.  Sodium 135.  Potassium 4.0.  BUN 

45.  Creatinine 1.1.  Glucose 174.  He is continued on antibiotics in form of 

Zosyn.  Continued on bronchodilators.





Objective





- Vital Signs


Vital signs: 


                                   Vital Signs











Temp  97.4 F L  09/29/22 05:00


 


Pulse  85   09/29/22 07:46


 


Resp  16   09/29/22 05:00


 


BP  94/62   09/29/22 05:00


 


Pulse Ox  97   09/29/22 07:34


 


FiO2      








                                 Intake & Output











 09/28/22 09/29/22 09/29/22





 18:59 06:59 18:59


 


Intake Total 540 240 


 


Output Total 900 200 


 


Balance -360 40 


 


Intake:   


 


  Intake, IV Titration 300  





  Amount   


 


    Magnesium Sulfate-D5w Pmx 100  





    1 gm In Dextrose/Water 1   





    100ml.bag @ 100 mls/hr   





    IVPB ONCE ONE Rx#:   





    [id number]   


 


    Piperacillin-Tazobactam 3 200  





    .375 gm In Sodium   





    Chloride 0.9% 100 ml @ 25   





    mls/hr IVPB Q8HR [facility] Rx#   





    :[id number]   


 


  Oral 240 240 


 


Output:   


 


  Urine 900 200 


 


Other:   


 


  Voiding Method External Catheter External Catheter External Catheter


 


  # Bowel Movements 1  1














- Exam





GENERAL EXAM: Alert, 89-year-old male patient, hard of hearing, on 2 L nasal 

cannula, comfortable in no apparent distress.


HEAD: Normocephalic.


EYES: Normal reaction of pupils, equal size.


NOSE: Clear with pink turbinates.


THROAT: No erythema or exudates.


NECK: No masses, no JVD.


CHEST: No chest wall deformity.


LUNGS: Equal air entry with crackles in the posterior bases.


CVS: S1 and S2 normal with no audible murmur, irregular rhythm.


ABDOMEN: No hepatosplenomegaly, normal bowel sounds, no guarding or rigidity.


SPINE: No scoliosis or deformity


SKIN: No rashes


CENTRAL NERVOUS SYSTEM: No focal deficits, tone is normal in all 4 extremities.


EXTREMITIES: There is trace peripheral edema.  No clubbing, no cyanosis.  

Peripheral pulses are intact.








- Labs


CBC & Chem 7: 


                                 09/29/22 05:56





                                 09/29/22 05:56


Labs: 


                  Abnormal Lab Results - Last 24 Hours (Table)











  09/29/22 09/29/22 Range/Units





  05:56 05:56 


 


WBC   17.56 H  (4.50-10.00)  X 10*3/uL


 


RBC   4.11 L  (4.40-5.60)  X 10*6/uL


 


Hgb   12.2 L  (13.0-17.0)  g/dL


 


Hct   36.0 L  (39.6-50.0)  %


 


BUN  45.0 H   (9.0-27.0)  mg/dL


 


Est GFR (CKD-EPI)NonAf  57.3 L   (60.0-200.0)   


 


BUN/Creatinine Ratio  39.82 H   (12.00-20.00)  Ratio


 


Glucose  174 H   ()  mg/dL


 


Magnesium  2.5 H   (1.5-2.4)  mg/dL








                      Microbiology - Last 24 Hours (Table)











 09/26/22 08:22 Blood Culture - Preliminary





 Blood    No Growth after 72 hours


 


 09/26/22 08:19 Urine Culture - Final





 Urine,Clean Catch    Pseudomonas aeruginosa





    Enterococcus faecalis


 


 09/26/22 16:54 Blood Culture - Preliminary





 Blood    No Growth after 48 hours














Assessment and Plan


Assessment: 





Atrial flutter ablation with a rapid ventricular response, anticoagulated with 

Eliquis.  Initiated on amiodarone





Acute hypoxemic respiratory failure secondary to fluid volume overload secondary

to above





Altered mental status with metabolic encephalopathy secondary to urinary tract 

infection





Urinary tract infection





History of recurrent urinary tract infections with ESBL





History of prostate cancer status post radiation placement





History of CVA 2017





History of dementia





History of GI bleed





Rheumatoid arthritis





Plan:





The patient was seen and evaluated


Labs and medications reviewed


Initiated on amiodarone


Heart rate better controlled


We will continue to follow 





I have personally seen and examined the patient, performed the documentation and

the assessment and plan as written.  Number of minutes spent on the visit: 10.

## 2022-09-29 NOTE — P.PN
Subjective


Progress Note Date: 09/29/22


Patient is seen at bedside and the per the patient's nurse as well as his 

daughter is a mentation has been the better today compared to yesterday.








Objective





- Vital Signs


Vital signs: 


                                   Vital Signs











Temp  97.8 F   09/29/22 11:11


 


Pulse  88   09/29/22 15:32


 


Resp  18   09/29/22 11:11


 


BP  102/66   09/29/22 11:11


 


Pulse Ox  97   09/29/22 15:18


 


FiO2      








                                 Intake & Output











 09/28/22 09/29/22 09/29/22





 18:59 06:59 18:59


 


Intake Total 540 240 


 


Output Total 900 200 


 


Balance -360 40 


 


Intake:   


 


  Intake, IV Titration 300  





  Amount   


 


    Magnesium Sulfate-D5w Pmx 100  





    1 gm In Dextrose/Water 1   





    100ml.bag @ 100 mls/hr   





    IVPB ONCE ONE Rx#:   





    [id number]   


 


    Piperacillin-Tazobactam 3 200  





    .375 gm In Sodium   





    Chloride 0.9% 100 ml @ 25   





    mls/hr IVPB Q8HR [facility] Rx#   





    :[id number]   


 


  Oral 240 240 


 


Output:   


 


  Urine 900 200 


 


Other:   


 


  Voiding Method External Catheter External Catheter External Catheter


 


  # Bowel Movements 1  1














- Exam





GENERAL: The patient is lying in bed and is not in acute distress.





NEUROLOGICAL:


Limited because of his cooperation.


Higher mental function: The patient is drowsy but awake to voice.  He's oriented

to self.  He stated that he is in the hospital.  He's able to follow a few 

simple commands and showing a thumbs up to thicken his tongue out.  


Cranial nerves: The pupils are round, equal and reactive to light.  No facial 

weakness.    Otherwise rest is limited.   


Motor: The strength is limited because of his cooperation. 


Cerebellum: Unable to assess.


Sensation: Unable to assess.


Reflexes (right/left): Unable to assess.


Plantars are mute bilaterally. 








Some other workup during this hospital visit consisted of:


AST and ALT is within normal limits


Ammonia level is less than 9


Vitamin B12 is 505


Serum folate is more than 20


TSH is 0.051 and the free T4 is 1.260


Urinalysis is suggestive of likely urinary tract infection


Urine tox screen is detected the positive for tricyclic antidepressant.


CT of the head is reported as no acute intracranial hemorrhage or midline shift.

 There is mild to moderate diffuse age-related cerebral atrophy and moderate to 

severe chronic small vessel ischemic changes redemonstrated and felt stable.  

Worsening acute on chronic bilateral maxillary and ethmoid sinus disease 

present, correlate clinically.  I personally reviewed the CT and agree with the 

finding of the report but in addition I felt the patient has a right frontal 

calcified likely meningioma.  And in the 2017 CT is reported as small calcified 

calcifying mass and interbody it is mentioned hemangioma or osteochondroma.  

I'll personally reviewed the CT 2017 I feel the size is about the same and it 

seems in the right frontal region as well.


Routine EEG it is reported as abnormal.  The background slowing suggestive of 

mild encephalopathy.  Otherwise there is no focal slowing, performed discharge 

or seizure on the EEG.











- Labs


CBC & Chem 7: 


                                 09/29/22 05:56





                                 09/29/22 05:56


Labs: 


                  Abnormal Lab Results - Last 24 Hours (Table)











  09/29/22 09/29/22 Range/Units





  05:56 05:56 


 


WBC   17.56 H  (4.50-10.00)  X 10*3/uL


 


RBC   4.11 L  (4.40-5.60)  X 10*6/uL


 


Hgb   12.2 L  (13.0-17.0)  g/dL


 


Hct   36.0 L  (39.6-50.0)  %


 


Immature Gran #   0.08 H  (0.00-0.04)  X 10*3/uL


 


Neutrophils #   14.95 H  (1.80-7.70)  X 10*3/uL


 


Monocytes #   1.54 H  (0.20-1.00)  X 10*3/uL


 


Eosinophils #   0 L  (0.04-0.35)  X 10*3/uL


 


BUN  45.0 H   (9.0-27.0)  mg/dL


 


Est GFR (CKD-EPI)NonAf  57.3 L   (60.0-200.0)   


 


BUN/Creatinine Ratio  39.82 H   (12.00-20.00)  Ratio


 


Glucose  174 H   ()  mg/dL


 


Magnesium  2.5 H   (1.5-2.4)  mg/dL








                      Microbiology - Last 24 Hours (Table)











 09/26/22 08:22 Blood Culture - Preliminary





 Blood    No Growth after 72 hours


 


 09/26/22 08:19 Urine Culture - Final





 Urine,Clean Catch    Pseudomonas aeruginosa





    Enterococcus faecalis


 


 09/26/22 16:54 Blood Culture - Preliminary





 Blood    No Growth after 48 hours














Assessment and Plan


Assessment: 





Questionable seizure activity at the [facility] facility (reported that upon trying to 

get up from his Kiki chair he became pale and developed uncontrolleable body 

shakes with drooling and leaning towards the right).  Possibly provoked due to 

underlying UTI


Encephalopathy due to underlying urinary tract infection--improving


Acute on chronic urinary tract infection


Advanced dementia


History of stroke without residual deficits


Right frontal calcified mass unchanged since 2017 (one of differential is 

meningioma)


History of prostate cancer status post radiation seeds


Paroxysmal atrial fibrillation on eliquis





Plan: 





Will not start antiepileptic drugs since this was first episode of questionable 

possible seizure.  If he has another episode will start medication and daughter 

is in agreement.


Recommend orthostatic vitals once the patient is more stable. 


Pumonary team is on board.


ID team is on board


PT and OT are consulted


Defer the rest of the medical management to the primary team


Recommend the patient to be evaluated by a neurologist as an outpatient within 

1-2 weeks upon discharge


Patient CODE STATUS is DO NOT RESUSCITATE





Plan was discussed with the patient's daughter and his nurse.


Otherwise there is no additional workup needed from a neurological perspective. 

We'll sign off.  Please reconsult if needed.





Shai Gamez M.D.


Neuro-hospital





Time with Patient: Less than 30

## 2022-09-30 NOTE — P.PN
Subjective


Progress Note Date: 09/30/22


Principal diagnosis: 


Urinary tract infection





Patient is 89 year old  male with a past medical history significant 

for recurrent urinary tract infection presented to hospital with mental status 

changes fever concerning for UTI.


On today's evaluation that is 09/30/2022, the patient remains to be afebrile, 

the patient is more awake and alert and sitting up in the chair, the patient is 

breathing comfortably, the patient denies chest pain or cough no abdominal pain 

or diarrhea








Objective





- Vital Signs


Vital signs: 


                                   Vital Signs











Temp  98.5 F   09/30/22 05:00


 


Pulse  84   09/30/22 09:44


 


Resp  16   09/29/22 17:50


 


BP  92/54   09/30/22 09:44


 


Pulse Ox  91 L  09/30/22 05:00


 


FiO2      








                                 Intake & Output











 09/29/22 09/30/22 09/30/22





 18:59 06:59 18:59


 


Intake Total  690 


 


Output Total 400  


 


Balance -400 690 


 


Intake:   


 


  Oral  690 


 


Output:   


 


  Urine 400  


 


Other:   


 


  Voiding Method External Catheter External Catheter 


 


  # Bowel Movements 1  














- Exam


GENERAL DESCRIPTION: An elderly male lying in bed in no distress





RESPIRATORY SYSTEM: Unlabored breathing , decreased breath sounds at bases





HEART: S1 S2 regular rate and rhythm ,





ABDOMEN: Soft , no tenderness





EXTREMITIES: No edema feet








- Labs


CBC & Chem 7: 


                                 09/30/22 07:07





                                 09/30/22 07:07


Labs: 


                  Abnormal Lab Results - Last 24 Hours (Table)











  09/29/22 09/30/22 09/30/22 Range/Units





  05:56 07:07 07:07 


 


WBC   12.79 H   (4.50-10.00)  X 10*3/uL


 


RBC   3.50 L   (4.40-5.60)  X 10*6/uL


 


Hgb   10.2 L   (13.0-17.0)  g/dL


 


Hct   31.1 L   (39.6-50.0)  %


 


Immature Gran #  0.08 H  0.05 H   (0.00-0.04)  X 10*3/uL


 


Neutrophils #  14.95 H  10.22 H   (1.80-7.70)  X 10*3/uL


 


Monocytes #  1.54 H  1.30 H   (0.20-1.00)  X 10*3/uL


 


Eosinophils #  0 L  0.01 L   (0.04-0.35)  X 10*3/uL


 


Anion Gap    9.80 L  (10.00-18.00)  mmol/L


 


BUN    47.9 H  (9.0-27.0)  mg/dL


 


BUN/Creatinine Ratio    59.88 H  (12.00-20.00)  Ratio








                      Microbiology - Last 24 Hours (Table)











 09/26/22 08:22 Blood Culture - Preliminary





 Blood    No Growth after 96 hours


 


 09/26/22 16:54 Blood Culture - Preliminary





 Blood    No Growth after 72 hours














Assessment and Plan


(1) UTI (urinary tract infection)


Current Visit: No   Status: Acute   Code(s): N39.0 - URINARY TRACT INFECTION, 

SITE NOT SPECIFIED   SNOMED Code(s): 72127443


   


Plan: 


1patient presented to hospital mental status changes likely multifactorial in 

this patient with a history of recurrent UTI with a positive UA likely urinary 

source patient seem to have grown multiple different pathogen in his urine 

culture  currently growing enterococcus and pseudomonas aeruginosa and both 

pathogen will be covered with the Zosyn which will be continued while inpatient 

however the patient will be able to finish therapy with oral Cipro and 

amoxicillin 7 days on discharge


Time with Patient: Less than 30

## 2022-09-30 NOTE — P.PN
Subjective


Progress Note Date: 09/30/22


H&P Date: 09/26/22


Chief Complaint: Since shaking, drooling, leaning to the right with accompanied 

labored br





This is a 84-year-old  gentleman with known history of paroxysmal 

atrial fibrillation, follows with Dr. Bustillos cardiologist, asthma, rheumatoid 

arthritis, history of prostate cancer status post radiation seeds, recurrent 

UTIs secondary to urinary retention related to urethral stricture lending to 

difficult catheter insertion/coude', history of cystoscopy with urethral 

dilations with , Urology, history of CVA with no residuals and multiple 

other medical issues sent to the ER from [facility].  Staff reports that 

upon getting patient up in his Kiki chair this morning before while down for 

breakfast, patient became pale ,developed uncontrollable shakes, accompanied by 

drooling, and proceeded to lean to the right into the staff member, developing 

increased shortness of breath.  Did not lose consciousness.  No nausea vomiting 

or diarrhea. Prior to procedure patient had been talking, post procedure patient

not conversing.  Shakiness resolved prior to EMS arrival.  No syncope.  Further 

EMS records currently unavailable. Glucose on admission 110.  EKG reporting 

sinus bradycardia, ventricular rate 57, brain CT no acute intracranial 

hemorrhage or midline shift, mild to moderate diffuse atrial agent cerebral 

atrophy and moderate to severe chronic small vessel ischemic changes 

redemonstrated and felt stable, worsening acute on chronic bilateral maxillary 

and ethmoid sinus disease.  Chest x-ray reported chronic emphysematous changes 

without acute pulmonary process, no significant change from prior.  Afebrile, 

WBC 8.4, hemoglobin 11.2, platelets 290, INR 1, sodium 137, potassium 4.2, 

bicarb 25, BUN 29, creatinine 0.62, troponin negative 1, total protein and 

albumin low at 6.2,3.  UA reporting tricyclic antidepressants.  Bladder scan 

reported 150, staff unable to straight cath secondary to patient has known his

tory of significant urethral stricture requiring catheter placement as per 

urology.  At bedside patient was able to answer simple yes and no questions at 

times, extremely hard of hearing.  Earlier presented with mild expressive 

aphasia per daughter.








09/27/2022 no further shakiness, no seizure activity reported. Evaluated by 

neurology, workup in progress, labs/EEG pending. Brain CT suggested worsening 

acute on chronic bilateral maxillary and ethmoid sinus disease, asymptomatic 

clinically.  Maintained on IV antibiotics.  UA reported rare WBC,, 70 WBCs, 

large leukocytes, negative nitrates, culture , preliminary blood culture 

reported no growth after 24 hours .  Wound dressing changed last night with no 

drainage reported. T-max 101.3, WBC 8.76.





09/28/2022 T-max 102.1.  Urine culture reporting gram-negative bacilli, group D 

enterococcus, IV antibiotics adjusted to Zosyn.  In the early morning hours 

developed atrial fibrillation with RVR with heart rates reported up into the 

180s, audible gurgling with fluid overload-difficulty clearing secretions, with 

continuous nonproductive coughing, received additional metoprolol, Lasix.  Labs 

pending.  Chest x-ray pending.  EEG reported as abnormal, across slowing 

suggestive of healed encephalopathy, no focal slowing,no elipeptiform discharge,

no seizure activity.








09/29/2022 Yesterday throughout the day, ongoing A. fib with RVR, desaturating 

on supplemental O2-merely maintaining O2 sats of 90-91%, worsening renal 

function and soft blood pressure. oral amiodarone initiated yesterday afternoon.

 Rate better controlled this morning.  Urine culture reporting pseudomonas 

aeruginosa and enterococcus faecalis, preliminary blood cultures reporting no 

growth.  Maintained on Zosyn. T-max 99.5, WBC 17.5 .  Hemoglobin 12.2, platelets

292 .  Electrolytes within normal limits .  BUN 45, creatinine 1.1. Much more 

alert.  Sitting up in bed, conversing, smiling and eating.  Maintaining O2 sats 

in the high 90s on room air.











09/30/2022 continues on Zosyn for UTI with pseudomonas aeruginosa/enterococcus 

faecalis.afebrile, WBC improved down to 12.7.  Renal function stable .Sitting up

in bed, consuming breakfast with assistance.  Telemetry A. fib with heart rate 

controlled on amiodarone and metoprolol.  Continues on nebulized bronchodilators

. Maintaining O2 sats in the 90s on 2 L nasal cannula.  





Objective





- Vital Signs


Vital signs: 


                                   Vital Signs











Temp  98.6 F   09/30/22 12:15


 


Pulse  106 H  09/30/22 12:15


 


Resp  20   09/30/22 12:15


 


BP  95/54   09/30/22 12:15


 


Pulse Ox  94 L  09/30/22 12:15


 


FiO2      








                                 Intake & Output











 09/29/22 09/30/22 09/30/22





 18:59 06:59 18:59


 


Intake Total  690 


 


Output Total 400  


 


Balance -400 690 


 


Intake:   


 


  Oral  690 


 


Output:   


 


  Urine 400  


 


Other:   


 


  Voiding Method External Catheter External Catheter External Catheter


 


  # Bowel Movements 1  














- Exam


PHYSICAL EXAM:


VITAL SIGNS: As above


GENERAL: Sitting up in bed, no acute distress.  Pleasantly Confused, alert and 

oriented 1.[place].


HEENT: Atraumatic, normocephalic , pupils round and equal ,Conjunctivae normal.


NECK:  No JVD. No thyroid enlargement. No LNs


CARDIOVASCULAR:  S1, S2 regular. Systolic murmur


RESPIRATION: Breath sounds diminished in the bases.  Fine Bibasilar crackles.


ABDOMEN:  Soft, nontender, nondistended . No guarding. no masses palpable.  

Possible Bowel sounds.


Extremities: Bilateral Dupuytren's contractures, history of right hand surgery, 

Left tip of great toe, bilateral heel and left great toe dressings clean dry and

intact, wearing offloading boots, trace edema.


NERVOUS SYSTEM: Limited exam. Cranial N 2-12 grossly normal.


Skin: Warm and dry, no rash.  














- Labs


CBC & Chem 7: 


                                 09/30/22 07:07





                                 09/30/22 07:07


Labs: 


                  Abnormal Lab Results - Last 24 Hours (Table)











  09/30/22 09/30/22 Range/Units





  07:07 07:07 


 


WBC  12.79 H   (4.50-10.00)  X 10*3/uL


 


RBC  3.50 L   (4.40-5.60)  X 10*6/uL


 


Hgb  10.2 L   (13.0-17.0)  g/dL


 


Hct  31.1 L   (39.6-50.0)  %


 


Immature Gran #  0.05 H   (0.00-0.04)  X 10*3/uL


 


Neutrophils #  10.22 H   (1.80-7.70)  X 10*3/uL


 


Monocytes #  1.30 H   (0.20-1.00)  X 10*3/uL


 


Eosinophils #  0.01 L   (0.04-0.35)  X 10*3/uL


 


Anion Gap   9.80 L  (10.00-18.00)  mmol/L


 


BUN   47.9 H  (9.0-27.0)  mg/dL


 


BUN/Creatinine Ratio   59.88 H  (12.00-20.00)  Ratio








                      Microbiology - Last 24 Hours (Table)











 09/26/22 08:22 Blood Culture - Preliminary





 Blood    No Growth after 96 hours


 


 09/26/22 16:54 Blood Culture - Preliminary





 Blood    No Growth after 72 hours














Assessment and Plan


Assessment: 


Acute metabolic encephalopathy secondary to suspected acute on chronic UTI 

related to urinary retention, in a patient with history of ESBL,  prostate 

cancer with radiation seeds , urinary retention.





Acute UTI with pseudomonas aeruginosa, enterococcus faecalis





Acute on chronic paroxysmal A. fib with RVR , follows with cardiologist, Dr. Bustillos 





Possible acute seizure activity, per [facility] report , no further seizure activity 

reported; suspect symptoms secondary to possibly hypoglycemic episode though 

blood sugar normal on admission, possible secondary to infection, UTI, possibly 

orthostatic hypotension.  No seizure activity reported per EEG.





Recently discharged on 08/08/2022 with sepsis secondary to bacterial prostatitis

with suspected acute UTI, urine and blood cultures that time failed to report 

any growth, despite fevers, elevated WBC and pro-calcitonin.





History of Urinary retention secondary to urethral stricture lending to 

difficult catheter insertion/coude', history of cystoscopy with urethral 

dilations with , Urology.  Status post dilation and placement of 12-

[place] coud tip catheter per urology on prior admission with Anderson catheter 

discontinued 08/08/2022.





Bilateral heel -stage II and left great toe ulcers, nonpressure-unstageable 

,present on admission; upon discharge-patient had sustained bilateral heel & 

left great toe blisters that later transitioned to wounds.  Debrided at wound 

care center on 9/22/22.  (Offloading boots with heels to be floating at all 

times)





Hypoalbuminemia





Chronic renal failure, stage II, secondary to chronic urinary retention





Chronic anemia secondary to the above





Chronic mild persistent bronchial asthma, stable





History of high right frontal meningioma , suspected CVA, no residuals.





History of rheumatoid arthritis, treated in the past, not on immunosuppressants





History of lumbar degenerative disc disease, spinal stenosis, L5 spondylosis, 

facet arthropathy, L3-4 laminectomy





Risk secondary to increased medical debility, increased weakness ( 2 person a

ssist), dementia in a patient with prior HX of Gait dysfunction, bilateral foot 

drop-greatest in the right.





Underlying dementia, advanced





[place]

















Plan: Continue on current medication regime ,monitoring and symptomatic 

treatment.Continue on amiodarone, Lopressor. Aggressive pulmonary toileting with

nebulized bronchodilators. Maintain IV antibiotics as per ID .Maintain 

amiodarone, Lopressor.  Wound care as previously directed per wound care team.  

Turn every 2hrs. assist feed with aspiration precautions.Close monitoring of 

renal function with repeat labs ordered for a.m. Prognosis guarded given 

multiple complex medical issues.











The impression and plan of care has been dictated as directed.





:


I performed a history and examination of this patient,  discussed the same with 

the dictator.  I agree with the dictator's note ,documented as a scribe.  Any 

additional findings or plans will be noted.

## 2022-09-30 NOTE — P.PN
Subjective


Progress Note Date: 09/30/22





This is a very pleasant 89-year-old male patient with a known history of 

bacterial prostatitis with sepsis in August 2022, urinary retention secondary to

urethral strictures, bilateral foot ulcers, chronic paroxysmal atrial 

fibrillation, chronic renal failure, chronic anemia, mild persistent chronic 

bronchial asthma, rheumatoid arthritis, medical debility with dementia, hearing 

disorder.  He was admitted 09/26/2022 after being admitted for altered mental 

status shaking and drooling.  Computed tomography scan revealed no acute 

intracranial hemorrhage or midline shift.  Chest x-ray showed chronic emph

ysematous changes without acute pulmonary process.  He was found to have a 

urinary tract infection secondary to gram-negative bacilli.  Blood cultures 

reveal no growth.  Earlier today he developed an acute episode of shortness of 

breath.  A. fib with RVR with a rate in the 180s, evidence of fluid volume 

overload and difficulty clearing his secretions.  We're consulted for the same. 

Presently he is resting fairly comfortably in bed.  Awake and alert in no acute 

distress.  He is maintaining good O2 saturations in the 90s on room air.  He's 

afebrile.  He did receive IV diuretics with increased urine output and has felt 

rather quick improvement.  He remains in atrial fibrillation with a controlled 

ventricular rhythm currently.  He is anticoagulated with Eliquis.  He's been on 

metoprolol 25 3 times a day.  He was initiated on antibiotics in the form of 

Zosyn.  He is given bronchodilators.





The patient is seen today 09/29/2022 in follow-up on the regular medical floor. 

He is currently resting fairly comfortably in bed.  Awake and alert in no acute 

distress.  He did have ongoing issues with atrial fibrillation with rapid 

ventricular response.  He was initiated on oral amiodarone.  His rate is better 

controlled today.  He is anticoagulated with Eliquis.  He is maintaining O2 

saturation in the 90s on 2 L/m per nasal cannula.  Urine culture is positive for

pseudomonas aeruginosa is and enterococcus faecalis.  Blood cultures reveal no 

growth.  White count 17.5.  Hemoglobin 12.2.  Sodium 135.  Potassium 4.0.  BUN 

45.  Creatinine 1.1.  Glucose 174.  He is continued on antibiotics in form of 

Zosyn.  Continued on bronchodilators.





The patient is seen today 09/30/2022 in follow-up on the regular medical floor. 

He is currently awake and alert in no acute distress.  Resting comfortably in 

bed.  Maintaining O2 saturations in the 90s on 2 L/m per nasal cannula.  Her 

rate to atrial fibrillation with a better controlled heart rate in the 80s 

currently.  He is maintained on amiodarone 400 twice a day.   Urine culture was 

positive for pseudomonas aeruginosa, enterococcus faecalis.  White count 12.7.  

Hemoglobin 10.2.  Sodium 138.  Potassium 3.5.  BUN 48.  Creatinine 0.8.  

Anticoagulated with Eliquis.  Remains on bronchodilators. Antibiotics in the 

form of Zosyn.





Objective





- Vital Signs


Vital signs: 


                                   Vital Signs











Temp  98.5 F   09/30/22 05:00


 


Pulse  84   09/30/22 09:44


 


Resp  16   09/29/22 17:50


 


BP  92/54   09/30/22 09:44


 


Pulse Ox  91 L  09/30/22 05:00


 


FiO2      








                                 Intake & Output











 09/29/22 09/30/22 09/30/22





 18:59 06:59 18:59


 


Intake Total  690 


 


Output Total 400  


 


Balance -400 690 


 


Intake:   


 


  Oral  690 


 


Output:   


 


  Urine 400  


 


Other:   


 


  Voiding Method External Catheter External Catheter 


 


  # Bowel Movements 1  














- Exam





GENERAL EXAM: Alert, pleasant 89-year-old male patient, hard of hearing, on 2 L 

nasal cannula, comfortable in no apparent distress.


HEAD: Normocephalic.


EYES: Normal reaction of pupils, equal size.


NOSE: Clear with pink turbinates.


THROAT: No erythema or exudates.


NECK: No masses, no JVD.


CHEST: No chest wall deformity.


LUNGS: Equal air entry with crackles in the posterior bases.


CVS: S1 and S2 normal with no audible murmur, irregular rhythm.


ABDOMEN: No hepatosplenomegaly, normal bowel sounds, no guarding or rigidity.


SPINE: No scoliosis or deformity


SKIN: No rashes


CENTRAL NERVOUS SYSTEM: No focal deficits, tone is normal in all 4 extremities.


EXTREMITIES: There is trace peripheral edema.  No clubbing, no cyanosis.  

Peripheral pulses are intact.








- Labs


CBC & Chem 7: 


                                 09/30/22 07:07





                                 09/30/22 07:07


Labs: 


                  Abnormal Lab Results - Last 24 Hours (Table)











  09/29/22 09/30/22 09/30/22 Range/Units





  05:56 07:07 07:07 


 


WBC   12.79 H   (4.50-10.00)  X 10*3/uL


 


RBC   3.50 L   (4.40-5.60)  X 10*6/uL


 


Hgb   10.2 L   (13.0-17.0)  g/dL


 


Hct   31.1 L   (39.6-50.0)  %


 


Immature Gran #  0.08 H  0.05 H   (0.00-0.04)  X 10*3/uL


 


Neutrophils #  14.95 H  10.22 H   (1.80-7.70)  X 10*3/uL


 


Monocytes #  1.54 H  1.30 H   (0.20-1.00)  X 10*3/uL


 


Eosinophils #  0 L  0.01 L   (0.04-0.35)  X 10*3/uL


 


Anion Gap    9.80 L  (10.00-18.00)  mmol/L


 


BUN    47.9 H  (9.0-27.0)  mg/dL


 


BUN/Creatinine Ratio    59.88 H  (12.00-20.00)  Ratio








                      Microbiology - Last 24 Hours (Table)











 09/26/22 08:22 Blood Culture - Preliminary





 Blood    No Growth after 96 hours


 


 09/26/22 16:54 Blood Culture - Preliminary





 Blood    No Growth after 72 hours














Assessment and Plan


Assessment: 





Atrial flutter ablation with a rapid ventricular response, anticoagulated with 

Eliquis.  Initiated on amiodarone





Acute hypoxemic respiratory failure secondary to fluid volume overload secondary

to above





Altered mental status with metabolic encephalopathy secondary to urinary tract 

infection





Urinary tract infection secondary to pseudomonas aeruginosa, Enterococcus 

faecalis





History of recurrent urinary tract infections with ESBL





History of prostate cancer status post radiation placement





History of CVA 2017





History of dementia





History of GI bleed





Rheumatoid arthritis





Plan:





The patient was seen and evaluated


Labs and medications reviewed


Titrate the FiO2 as tolerated


We will continue to follow 





I have personally seen and examined the patient, performed the documentation and

the assessment and plan as written.  Number of minutes spent on the visit: 10.

## 2022-09-30 NOTE — P.PN
Subjective


Progress Note Date: 09/28/22


Principal diagnosis: 


Urinary tract infection





Patient is 89 year old  male with a past medical history significant 

for recurrent urinary tract infection presented to hospital with mental status 

changes fever concerning for UTI.


On today's evaluation that is 09/28/2022, the patient did spike a fever only 

this morning of 102.1F, the patient is afebrile since then the patient seemed 

to be slightly more awake and alert he is breathing comfortably no vomiting 

diarrhea or any other changes were reported by the nursing staff








Objective





- Vital Signs


Vital signs: 


                                   Vital Signs











Temp  98.1 F   09/28/22 11:15


 


Pulse  96   09/28/22 11:27


 


Resp  16   09/28/22 11:15


 


BP  95/59   09/28/22 11:15


 


Pulse Ox  94 L  09/28/22 11:15


 


FiO2      








                                 Intake & Output











 09/27/22 09/28/22 09/28/22





 18:59 06:59 18:59


 


Intake Total 290  


 


Output Total  900 


 


Balance 290 -900 


 


Weight 58.967 kg  


 


Intake:   


 


  Intake, IV Titration 50  





  Amount   


 


    cefTRIAXone 1 gm In 50  





    Sodium Chloride 0.9% 50   





    ml @ 100 mls/hr IVPB ONCE   





    STA Rx#:[id number]   


 


  Oral 240  


 


Output:   


 


  Urine  900 


 


Other:   


 


  Voiding Method External Catheter  External Catheter


 


  # Bowel Movements   1














- Exam


GENERAL DESCRIPTION: An elderly male lying in bed in no distress





RESPIRATORY SYSTEM: Unlabored breathing , decreased breath sounds at bases





HEART: S1 S2 regular rate and rhythm ,





ABDOMEN: Soft , no tenderness





EXTREMITIES: No edema feet








- Labs


CBC & Chem 7: 


                                 09/30/22 07:07





                                 09/30/22 07:07


Labs: 


                  Abnormal Lab Results - Last 24 Hours (Table)











  09/28/22 Range/Units





  10:17 


 


Sodium  134 L  (137-145)  mmol/L


 


Chloride  96 L  ()  mmol/L


 


BUN  28 H  (9-20)  mg/dL


 


Glucose  162 H  (74-99)  mg/dL








                      Microbiology - Last 24 Hours (Table)











 09/26/22 08:22 Blood Culture - Preliminary





 Blood    No Growth after 48 hours


 


 09/26/22 08:19 Urine Culture - Preliminary





 Urine,Clean Catch    Gram Neg Bacilli





    Group D Enterococcus


 


 09/26/22 16:54 Blood Culture - Preliminary





 Blood    No Growth after 24 hours














Assessment and Plan


(1) UTI (urinary tract infection)


Current Visit: No   Status: Acute   Code(s): N39.0 - URINARY TRACT INFECTION, 

SITE NOT SPECIFIED   SNOMED Code(s): 69688213


   


Plan: 


1patient presented to hospital mental status changes likely multifactorial in 

this patient with a history of recurrent UTI with a positive UA likely urinary 

source patient seem to have grown multiple different pathogen in his urine 

culture  currently growing enterococcus and gram-negative bacilli patient 

antibiotic was switched over to last night to Zosyn which will be continued 

while waiting for the cultures to be finalize





Time with Patient: Less than 30

## 2022-09-30 NOTE — P.PN
Subjective


Progress Note Date: 09/29/22


Principal diagnosis: 


Urinary tract infection





Patient is 89 year old  male with a past medical history significant 

for recurrent urinary tract infection presented to hospital with mental status 

changes fever concerning for UTI.


On today's evaluation that is 09/29/2022, the patient has been afebrile with no 

fever or the last 24 hours the patient slightly more awake and alert, the 

patient is breathing comfortably no chest pain or cough no abdominal pain or 

diarrhea








Objective





- Vital Signs


Vital signs: 


                                   Vital Signs











Temp  97.8 F   09/29/22 11:11


 


Pulse  82   09/29/22 11:32


 


Resp  18   09/29/22 11:11


 


BP  102/66   09/29/22 11:11


 


Pulse Ox  98   09/29/22 11:11


 


FiO2      








                                 Intake & Output











 09/28/22 09/29/22 09/29/22





 18:59 06:59 18:59


 


Intake Total 540 240 


 


Output Total 900 200 


 


Balance -360 40 


 


Intake:   


 


  Intake, IV Titration 300  





  Amount   


 


    Magnesium Sulfate-D5w Pmx 100  





    1 gm In Dextrose/Water 1   





    100ml.bag @ 100 mls/hr   





    IVPB ONCE ONE Rx#:   





    [id number]   


 


    Piperacillin-Tazobactam 3 200  





    .375 gm In Sodium   





    Chloride 0.9% 100 ml @ 25   





    mls/hr IVPB Q8HR SRIDHAR Rx#   





    [id number]   


 


  Oral 240 240 


 


Output:   


 


  Urine 900 200 


 


Other:   


 


  Voiding Method External Catheter External Catheter External Catheter


 


  # Bowel Movements 1  1














- Exam


GENERAL DESCRIPTION: An elderly male lying in bed in no distress





RESPIRATORY SYSTEM: Unlabored breathing , decreased breath sounds at bases





HEART: S1 S2 regular rate and rhythm ,





ABDOMEN: Soft , no tenderness





EXTREMITIES: No edema feet








- Labs


CBC & Chem 7: 


                                 09/30/22 07:07





                                 09/30/22 07:07


Labs: 


                  Abnormal Lab Results - Last 24 Hours (Table)











  09/29/22 09/29/22 Range/Units





  05:56 05:56 


 


WBC   17.56 H  (4.50-10.00)  X 10*3/uL


 


RBC   4.11 L  (4.40-5.60)  X 10*6/uL


 


Hgb   12.2 L  (13.0-17.0)  g/dL


 


Hct   36.0 L  (39.6-50.0)  %


 


Immature Gran #   0.08 H  (0.00-0.04)  X 10*3/uL


 


Neutrophils #   14.95 H  (1.80-7.70)  X 10*3/uL


 


Monocytes #   1.54 H  (0.20-1.00)  X 10*3/uL


 


Eosinophils #   0 L  (0.04-0.35)  X 10*3/uL


 


BUN  45.0 H   (9.0-27.0)  mg/dL


 


Est GFR (CKD-EPI)NonAf  57.3 L   (60.0-200.0)   


 


BUN/Creatinine Ratio  39.82 H   (12.00-20.00)  Ratio


 


Glucose  174 H   ()  mg/dL


 


Magnesium  2.5 H   (1.5-2.4)  mg/dL








                      Microbiology - Last 24 Hours (Table)











 09/26/22 08:22 Blood Culture - Preliminary





 Blood    No Growth after 72 hours


 


 09/26/22 08:19 Urine Culture - Final





 Urine,Clean Catch    Pseudomonas aeruginosa





    Enterococcus faecalis


 


 09/26/22 16:54 Blood Culture - Preliminary





 Blood    No Growth after 48 hours














Assessment and Plan


(1) UTI (urinary tract infection)


Current Visit: No   Status: Acute   Code(s): N39.0 - URINARY TRACT INFECTION, 

SITE NOT SPECIFIED   SNOMED Code(s): 92320183


   


Plan: 


1patient presented to hospital mental status changes likely multifactorial in 

this patient with a history of recurrent UTI with a positive UA likely urinary 

source patient seem to have grown multiple different pathogen in his urine 

culture  currently growing enterococcus and pseudomonas aeruginosa and both 

pathogen will be covered with the Zosyn and Invanz was discontinued and this was

explained to the patient and the questions concerned were answered


Time with Patient: Less than 30

## 2022-10-01 NOTE — P.PN
Subjective


Progress Note Date: 10/01/22





Patient is a 84-year-old male with PMH of paroxysmal atrial fibrillation, 

asthma, rheumatoid arthritis, history of prostate cancer status post radiation 

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 

history of CVA that presented to the ED from his [facility] for generalized weakness, 

appearing pale, uncontrollable shakiness and shortness of breath.  He was 

diagnosed with acute metabolic encephalopathy secondary to UTI.  





Patient was seen and examined.  He reports feeling fair.  He denies any 

abdominal pain or dysuria.  No nausea or vomiting.  No fever or chills.  States 

that he is hungry, requesting banana, does not remember if he ate breakfast.





General: non toxic, no distress, appears at stated age


Derm: warm, dry


Head: atraumatic, normocephalic, symmetric


Eyes: EOMI, no lid lag, anicteric sclera


Mouth: no lip lesion, mucus membranes moist


Cardiovascular: Irregularly irregular, no murmur


Lungs: CTA bilateral, no rhonchi, no rales , no accessory muscle use


Ext: no gross muscle atrophy, 1+ pitting edema bilateral lower extremity edema, 

no contractures


Neuro: no focal neuro deficits


Psych: Alert, oriented, appropriate affect 





#Acute metabolic encephalopathy


#Urinary tract infection


Risk factors include history of prostate cancer with radiation, urinary 

retention due to urethral stricture.


B12 and Folate within normal limits.


EEG shows mild encephalopathy without epileptiform discharge.


Brain CT negative for acute CVA.


Urine culture positive for Enterococcus faecalis and pseudomonas.  Blood culture

negative at 96 hours.  Leukocytosis improving.  Patient be continued on Zosyn.  

Tylenol as needed for fever.  Infectious disease, neurology and urology on board

.





#Atrial fibrillation with RVR


Echocardiogram from August 2022 shows EF of 50-55%.


Continue amiodarone and metoprolol. Continue Eliquis for anticoagulation. 

Maintain K > 4 and Mg > 2. Telemetry monitoring.  Cardiology on board.





#Prerenal azotemia


Patient encouraged hydration by mouth.  Avoid nephrotoxins.  Repeat BMP tomorrow

morning.





#Normocytic anemia


Appears at baseline.  No active signs of bleeding.  Continue to monitor.





#Subclinical hyperthyroidism


Repeat TSH and FT4 in 6 weeks.





Resolved: Acute hypoxic respiratory failure, discontinue scheduled DuoNeb





Chronic conditions: Asthma, rheumatoid arthritis, history of prostate cancer, 

history of CVA





Objective





- Vital Signs


Vital signs: 


                                   Vital Signs











Temp  98.1 F   10/01/22 05:00


 


Pulse  74   10/01/22 07:45


 


Resp  12   10/01/22 05:00


 


BP  101/57   10/01/22 05:00


 


Pulse Ox  93 L  10/01/22 07:30


 


FiO2      








                                 Intake & Output











 09/30/22 10/01/22 10/01/22





 18:59 06:59 18:59


 


Output Total 1050 400 


 


Balance -1050 -400 


 


Weight 58.967 kg  


 


Output:   


 


  Urine 1050 400 


 


Other:   


 


  Voiding Method External Catheter External Catheter External Catheter


 


  # Bowel Movements 1  














- Labs


CBC & Chem 7: 


                                 09/30/22 07:07





                                 09/30/22 07:07


Labs: 


                  Abnormal Lab Results - Last 24 Hours (Table)











  09/30/22 Range/Units





  07:07 


 


Anion Gap  9.80 L  (10.00-18.00)  mmol/L


 


BUN  47.9 H  (9.0-27.0)  mg/dL


 


BUN/Creatinine Ratio  59.88 H  (12.00-20.00)  Ratio








                      Microbiology - Last 24 Hours (Table)











 09/26/22 08:22 Blood Culture - Preliminary





 Blood    No Growth after 120 hours


 


 09/26/22 16:54 Blood Culture - Preliminary





 Blood    No Growth after 96 hours

## 2022-10-02 NOTE — P.PN
Subjective


Progress Note Date: 10/02/22





Patient is a 84-year-old male with PMH of paroxysmal atrial fibrillation, 

asthma, rheumatoid arthritis, history of prostate cancer status post radiation 

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 

history of CVA that presented to the ED from his [facility] for generalized weakness, 

appearing pale, uncontrollable shakiness and shortness of breath.  He was 

diagnosed with acute metabolic encephalopathy secondary to UTI.  





Patient was seen and examined.  He reports feeling better today.  He denies any 

abdominal pain or dysuria.  No nausea or vomiting.  No fever or chills. 





General: non toxic, no distress, appears at stated age


Derm: warm, dry


Head: atraumatic, normocephalic, symmetric


Eyes: EOMI, no lid lag, anicteric sclera


Mouth: no lip lesion, mucus membranes moist


Cardiovascular: Irregularly irregular, no murmur


Lungs: CTA bilateral, no rhonchi, no rales , no accessory muscle use


Ext: no gross muscle atrophy, 1+ pitting edema bilateral lower extremity edema, 

no contractures


Neuro: no focal neuro deficits


Psych: Alert, oriented, appropriate affect 





#Acute metabolic encephalopathy


#Urinary tract infection


Risk factors include history of prostate cancer with radiation, urinary 

retention due to urethral stricture.


B12 and Folate within normal limits.


EEG shows mild encephalopathy without epileptiform discharge.


Brain CT negative for acute CVA.


Urine culture positive for Enterococcus faecalis and pseudomonas.  Blood culture

negative at 144 hours.  Leukocytosis improving.  Patient be continued on Zosyn. 

Tylenol as needed for fever.  Infectious disease, neurology and urology on 

board.





#Atrial fibrillation with RVR


Echocardiogram from August 2022 shows EF of 50-55%.


Continue amiodarone and metoprolol. Continue Eliquis for anticoagulation. 

Maintain K > 4 and Mg > 2. Telemetry monitoring.  Cardiology on board.





#Prerenal azotemia


Patient encouraged hydration by mouth.  Avoid nephrotoxins.  Repeat BMP tomorrow

morning.





#Normocytic anemia


Appears at baseline.  No active signs of bleeding.  Continue to monitor.





#Subclinical hyperthyroidism


Repeat TSH and FT4 in 6 weeks.





Resolved: Acute hypoxic respiratory failure, discontinue scheduled DuoNeb





Chronic conditions: Asthma, rheumatoid arthritis, history of prostate cancer, 

history of CVA





Objective





- Vital Signs


Vital signs: 


                                   Vital Signs











Temp  98.9 F   10/02/22 04:05


 


Pulse  67   10/02/22 11:26


 


Resp  15   10/02/22 04:05


 


BP  136/66   10/02/22 11:26


 


Pulse Ox  95   10/02/22 07:15


 


FiO2  21   10/01/22 19:33








                                 Intake & Output











 10/01/22 10/02/22 10/02/22





 18:59 06:59 18:59


 


Output Total 750 1150 


 


Balance -750 -1150 


 


Output:   


 


  Urine 750 1150 


 


Other:   


 


  Voiding Method External Catheter External Catheter 


 


  # Bowel Movements 2  














- Labs


CBC & Chem 7: 


                                 09/30/22 07:07





                                 09/30/22 07:07


Labs: 


                      Microbiology - Last 24 Hours (Table)











 09/26/22 08:22 Blood Culture - Final





 Blood    No Growth after 144 hours


 


 09/26/22 16:54 Blood Culture - Preliminary





 Blood    No Growth after 120 hours

## 2022-10-02 NOTE — P.PN
Subjective


Progress Note Date: 10/02/22


Principal diagnosis: 


Urinary tract infection





Patient is 89 year old  male with a past medical history significant 

for recurrent urinary tract infection presented to hospital with mental status 

changes fever concerning for UTI.


On today's evaluation that is 10/2/2022 patient remains to be afebrile, the 

patient is breathing comfortably will be the patient denies having any chest 

pain shortness with or cough no nausea vomiting no abdominal pain no diarrhea





Objective





- Vital Signs


Vital signs: 


                                   Vital Signs











Temp  98.6 F   10/02/22 11:38


 


Pulse  65   10/02/22 11:38


 


Resp  18   10/02/22 11:38


 


BP  136/53   10/02/22 11:38


 


Pulse Ox  98   10/02/22 11:38


 


FiO2  21   10/01/22 19:33








                                 Intake & Output











 10/01/22 10/02/22 10/02/22





 18:59 06:59 18:59


 


Output Total 750 1150 


 


Balance -750 -1150 


 


Output:   


 


  Urine 750 1150 


 


Other:   


 


  Voiding Method External Catheter External Catheter External Catheter


 


  # Bowel Movements 2  














- Exam


GENERAL DESCRIPTION: An elderly male lying in bed in no distress





RESPIRATORY SYSTEM: Unlabored breathing , decreased breath sounds at bases





HEART: S1 S2 regular rate and rhythm ,





ABDOMEN: Soft , no tenderness





EXTREMITIES: No edema feet








- Labs


CBC & Chem 7: 


                                 09/30/22 07:07





                                 09/30/22 07:07


Labs: 


                      Microbiology - Last 24 Hours (Table)











 09/26/22 08:22 Blood Culture - Final





 Blood    No Growth after 144 hours


 


 09/26/22 16:54 Blood Culture - Preliminary





 Blood    No Growth after 120 hours














Assessment and Plan


(1) UTI (urinary tract infection)


Current Visit: No   Status: Acute   Code(s): N39.0 - URINARY TRACT INFECTION, 

SITE NOT SPECIFIED   SNOMED Code(s): 54988590


   


Plan: 


1patient presented to hospital mental status changes likely multifactorial in 

this patient with a history of recurrent UTI with a positive UA likely urinary 

source patient seem to have grown multiple different pathogen in his urine 

culture  currently growing enterococcus and pseudomonas aeruginosa and both 

pathogen will be covered with the Zosyn 


2-Patient is clinically improved with the Zosyn, the patient to finish therapy 

with oral Cipro and amoxicillin 7 days on discharge


Time with Patient: Less than 30

## 2022-10-02 NOTE — P.PN
Subjective


Progress Note Date: 10/01/22


Principal diagnosis: 


Urinary tract infection





Patient is 89 year old  male with a past medical history significant 

for recurrent urinary tract infection presented to hospital with mental status 

changes fever concerning for UTI.


On today's evaluation that is 10/1/2022, the patient continues to be afebrile 

the patient is breathing comfortably on room air denies any chest pain shortness

of breath or cough no abdominal pain no diarrhea





Objective





- Vital Signs


Vital signs: 


                                   Vital Signs











Temp  98.1 F   10/01/22 05:00


 


Pulse  74   10/01/22 07:45


 


Resp  12   10/01/22 05:00


 


BP  101/57   10/01/22 05:00


 


Pulse Ox  93 L  10/01/22 07:30


 


FiO2      








                                 Intake & Output











 09/30/22 10/01/22 10/01/22





 18:59 06:59 18:59


 


Output Total 1050 400 


 


Balance -1050 -400 


 


Weight 58.967 kg  


 


Output:   


 


  Urine 1050 400 


 


Other:   


 


  Voiding Method External Catheter External Catheter 


 


  # Bowel Movements 1  














- Exam


GENERAL DESCRIPTION: An elderly male lying in bed in no distress





RESPIRATORY SYSTEM: Unlabored breathing , decreased breath sounds at bases





HEART: S1 S2 regular rate and rhythm ,





ABDOMEN: Soft , no tenderness





EXTREMITIES: No edema feet








- Labs


CBC & Chem 7: 


                                 09/30/22 07:07





                                 09/30/22 07:07


Labs: 


                  Abnormal Lab Results - Last 24 Hours (Table)











  09/30/22 09/30/22 Range/Units





  07:07 07:07 


 


WBC  12.79 H   (4.50-10.00)  X 10*3/uL


 


RBC  3.50 L   (4.40-5.60)  X 10*6/uL


 


Hgb  10.2 L   (13.0-17.0)  g/dL


 


Hct  31.1 L   (39.6-50.0)  %


 


Immature Gran #  0.05 H   (0.00-0.04)  X 10*3/uL


 


Neutrophils #  10.22 H   (1.80-7.70)  X 10*3/uL


 


Monocytes #  1.30 H   (0.20-1.00)  X 10*3/uL


 


Eosinophils #  0.01 L   (0.04-0.35)  X 10*3/uL


 


Anion Gap   9.80 L  (10.00-18.00)  mmol/L


 


BUN   47.9 H  (9.0-27.0)  mg/dL


 


BUN/Creatinine Ratio   59.88 H  (12.00-20.00)  Ratio








                      Microbiology - Last 24 Hours (Table)











 09/26/22 16:54 Blood Culture - Preliminary





 Blood    No Growth after 96 hours


 


 09/26/22 08:22 Blood Culture - Preliminary





 Blood    No Growth after 96 hours














Assessment and Plan


(1) UTI (urinary tract infection)


Current Visit: No   Status: Acute   Code(s): N39.0 - URINARY TRACT INFECTION, 

SITE NOT SPECIFIED   SNOMED Code(s): 74702074


   


Plan: 


1patient presented to hospital mental status changes likely multifactorial in 

this patient with a history of recurrent UTI with a positive UA likely urinary 

source patient seem to have grown multiple different pathogen in his urine cultu

re  currently growing enterococcus and pseudomonas aeruginosa and both pathogen 

will be covered with the Zosyn 


2-Patient seem to have shown clinical improvement to continue with the Zosyn, 

the patient will be able to finish therapy with oral Cipro and amoxicillin 7 

days on discharge


Time with Patient: Less than 30

## 2022-10-03 NOTE — P.DS
Providers


Date of admission: 


09/27/22 14:12





Expected date of discharge: 10/03/22


Attending physician: 


Tyron Hummel MD





Consults: 





                                        





09/26/22 10:33


Consult Physician Routine 


   Consulting Provider: Gautam Vann


   Consult Reason/Comments: stricture, urinary retention, hx of ESBL,coude 

placement cath.,ua


                                                with cx


   Do you want consulting provider notified?: Yes





09/26/22 14:56


Consult Physician Routine 


   Consulting Provider: Shai Gamez


   Consult Reason/Comments: poss. seizure activity


   Do you want consulting provider notified?: Yes





09/27/22 10:34


Consult Physician Routine 


   Consulting Provider: Keila John


   Consult Reason/Comments: uti, hx of ESBL


   Do you want consulting provider notified?: Yes





09/28/22 11:54


Consult Physician Routine 


   Consulting Provider: Jimmy Mei


   Consult Reason/Comments: Hypoxia, COPD/Asthma


   Do you want consulting provider notified?: Yes











Primary care physician: 


Angelica Alvarez





Hospital Course: 


Final Diagnoses:





Acute metabolic encephalopathy secondary to suspected acute on chronic UTI 

related to urinary retention, in a patient with history of ESBL,  prostate 

cancer with radiation seeds , urinary retention.





Acute UTI with pseudomonas aeruginosa, enterococcus faecalis





Acute on chronic paroxysmal A. fib with RVR , follows with cardiologist, Dr. Bustillos 





Possible acute seizure activity, per [facility] report , no further seizure activity 

reported; suspect symptoms secondary to possibly hypoglycemic episode though 

blood sugar normal on admission, possible secondary to infection, UTI, possibly 

orthostatic hypotension.  No seizure activity reported per EEG.





Recently discharged on 08/08/2022 with sepsis secondary to bacterial prostatitis

with suspected acute UTI, urine and blood cultures that time failed to report 

any growth, despite fevers, elevated WBC and pro-calcitonin.





History of Urinary retention secondary to urethral stricture lending to 

difficult catheter insertion/coude', history of cystoscopy with urethral 

dilations with , Urology.  Status post dilation and placement of 12-

[place] coud tip catheter per urology on prior admission with Anderson catheter 

discontinued 08/08/2022.





Bilateral heel -stage II and left great toe ulcers, nonpressure-unstageable 

,present on admission; upon discharge-patient had sustained bilateral heel & 

left great toe blisters that later transitioned to wounds.  Debrided at wound 

care center on 9/22/22.  (Offloading boots with heels to be floating at all 

times)





Hypoalbuminemia





Chronic renal failure, stage II, secondary to chronic urinary retention





Chronic anemia secondary to the above





Chronic mild persistent bronchial asthma, stable





History of high right frontal meningioma , suspected CVA, no residuals.





History of rheumatoid arthritis, treated in the past, not on immunosuppressants





History of lumbar degenerative disc disease, spinal stenosis, L5 spondylosis, 

facet arthropathy, L3-4 laminectomy





Risk secondary to increased medical debility, increased weakness ( 2 person 

assist), dementia in a patient with prior HX of Gait dysfunction, bilateral foot

drop-greatest in the right.





Underlying dementia, advanced





[place]





Subclinical hyperthyroidism, TSH 0.051, free T4 1 0.26; with repeat TSH and free

T4 in 6 weeks














This is a 84-year-old  gentleman with known history of paroxysmal 

atrial fibrillation, follows with Dr. Bustillos cardiologist, asthma, rheumatoid 

arthritis, history of prostate cancer status post radiation seeds, recurrent 

UTIs secondary to urinary retention related to urethral stricture lending to 

difficult catheter insertion/coude', history of cystoscopy with urethral 

dilations with , Urology, history of CVA with no residuals and multiple 

other medical issues sent to the ER from [facility].  Staff reports that 

upon getting patient up in his Kiki chair this morning before while down for 

breakfast, patient became pale ,developed uncontrollable shakes, accompanied by 

drooling, and proceeded to lean to the right into the staff member, developing 

increased shortness of breath.  Did not lose consciousness.  No nausea vomiting 

or diarrhea. Prior to procedure patient had been talking, post procedure patient

not conversing.  Shakiness resolved prior to EMS arrival.  No syncope.  Further 

EMS records currently unavailable. Glucose on admission 110.  EKG reporting 

sinus bradycardia, ventricular rate 57, brain CT no acute intracranial 

hemorrhage or midline shift, mild to moderate diffuse atrial agent cerebral 

atrophy and moderate to severe chronic small vessel ischemic changes 

redemonstrated and felt stable, worsening acute on chronic bilateral maxillary 

and ethmoid sinus disease.  Chest x-ray reported chronic emphysematous changes 

without acute pulmonary process, no significant change from prior.  Afebrile, 

WBC 8.4, hemoglobin 11.2, platelets 290, INR 1, sodium 137, potassium 4.2, 

bicarb 25, BUN 29, creatinine 0.62, troponin negative 1, total protein and 

albumin low at 6.2,3.  UA reporting tricyclic antidepressants.  Bladder scan 

reported 150, staff unable to straight cath secondary to patient has known 

history of significant urethral stricture requiring catheter placement as per 

urology.  At bedside patient was able to answer simple yes and no questions at 

times, extremely hard of hearing.  Earlier presented with mild expressive 

aphasia per daughter.








09/27/2022 no further shakiness, no seizure activity reported. Evaluated by 

neurology, workup in progress, labs/EEG pending. Brain CT suggested worsening 

acute on chronic bilateral maxillary and ethmoid sinus disease, asymptomatic 

clinically.  Maintained on IV antibiotics.  UA reported rare WBC,, 70 WBCs, 

large leukocytes, negative nitrates, culture , preliminary blood culture 

reported no growth after 24 hours .  Wound dressing changed last night with no 

drainage reported. T-max 101.3, WBC 8.76.





09/28/2022 T-max 102.1.  Urine culture reporting gram-negative bacilli, group D 

enterococcus, IV antibiotics adjusted to Zosyn.  In the early morning hours 

developed atrial fibrillation with RVR with heart rates reported up into the 

180s, audible gurgling with fluid overload-difficulty clearing secretions, with 

continuous nonproductive coughing, received additional metoprolol, Lasix.  Labs 

pending.  Chest x-ray pending.  EEG reported as abnormal, across slowing 

suggestive of healed encephalopathy, no focal slowing,no elipeptiform discharge,

no seizure activity.








09/29/2022 Yesterday throughout the day, ongoing A. fib with RVR, desaturating 

on supplemental O2-merely maintaining O2 sats of 90-91%, worsening renal 

function and soft blood pressure. oral amiodarone initiated yesterday afternoon.

 Rate better controlled this morning.  Urine culture reporting pseudomonas 

aeruginosa and enterococcus faecalis, preliminary blood cultures reporting no 

growth.  Maintained on Zosyn. T-max 99.5, WBC 17.5 .  Hemoglobin 12.2, platelets

292 .  Electrolytes within normal limits .  BUN 45, creatinine 1.1. Much more 

alert.  Sitting up in bed, conversing, smiling and eating.  Maintaining O2 sats 

in the high 90s on room air.











09/30/2022 continues on Zosyn for UTI with pseudomonas aeruginosa/enterococcus 

faecalis.afebrile, WBC improved down to 12.7.  Renal function stable .Sitting up

in bed, consuming breakfast with assistance.  Telemetry A. fib with heart rate 

controlled on amiodarone and metoprolol.  Continues on nebulized 

bronchodilators. Maintaining O2 sats in the 90s on 2 L nasal cannula.  











Significant clinical improvement.  Converted to sinus rhythm during the weekend.

 Maintained on IV Zosyn and will finish therapy with both Cipro and amoxicillin 

7 days .Denies chest pain, palpitations or shortness of breath.  No nausea, 

vomiting or abdominal pain.  No diarrhea.  Last bowel movement this morning. 

Patient will be just to discharged back to [facility] with [facility] 

care ,today in a stable condition with guarded prognosis.














The impression and plan of care has been dictated as directed.





:


I performed a history and examination of this patient,  discussed the same with 

the dictator.  I agree with the dictator's note ,documented as a scribe.  Any 

additional findings or plans will be noted.


Patient Condition at Discharge: Stable





Plan - Discharge Summary


Discharge Rx Participant: Yes


New Discharge Prescriptions: 


New


   Amoxicillin 500 mg PO TID 7 Days #21 capsule


   Ciprofloxacin HCl [Cipro] 500 mg PO BID 7 Days #14 tab


   Amiodarone [Cordarone] See Taper PO AS DIRECTED #35 tab


   Collagenase [Santyl Ointment] 1 applic TOPICAL DAILY  each


   Metoprolol Tartrate [Lopressor] 25 mg PO TID #90 tab





Continue


   Folic Acid 1 mg PO DAILY@1800


   Cyanocobalamin [Vitamin B-12] 500 mcg PO DAILY@0800


   Donepezil [Aricept] 10 mg PO DAILY@0800


   ALPRAZolam [Xanax] 0.5 mg PO BID PRN


     PRN Reason: Anxiety


   Pantoprazole [Protonix] 40 mg PO DAILY@0800


   Ipratropium-Albuterol Nebulize [Duoneb 0.5 mg-3 mg/3 ml Soln] 3 ml INHALATION

RT-QID PRN


     PRN Reason: Shortness Of Breath


   Magnesium Hydroxide [Milk of Magnesia] 2,400 mg PO Q48H PRN


     PRN Reason: Constipation


   Zinc Sulfate [Orazinc] 220 mg PO DAILY@0800


   Pravastatin Sodium [Pravachol] 40 mg PO HS@1800


   Fluticasone/Vilanterol [Breo Ellipta 100-25 Mcg Inhaler] 1 puff INHALATION 

RT-DAILY@0800


   Mirtazapine [Remeron] 30 mg PO HS@1800


   Apixaban [Eliquis] 2.5 mg PO BID@0800,1800


   Artificial Tears-Hypromellose [Artificial Tear Drops] 2 drops BOTH EYES Q4HR 

PRN #1 ml


     PRN Reason: Dry Eye(S)


   LORazepam [Ativan] 0.5 mg PO Q4H PRN


     PRN Reason: Anxiety


   Permethrin 5% Cream [Elimite] 1 applic TOPICAL DAILY@0800


   Ascorbic Acid [Vitamin C] 500 mg PO BID@0800,1800


   guaiFENesin SYRUP 100MG/5ML [Robitussin] 200 mg PO Q6H PRN


     PRN Reason: Cough


   Cranberry Fruit Extract [Cranberry] 500 mg PO DAILY@0800


   QUEtiapine [SEROquel] 25 mg PO HS@1800 #30 tab


   Floranex Packet 1 packet PO DAILY@0800


   Hyoscyamine Sulfate [Levsin-Sl] 0.125 mg SL Q4H PRN


     PRN Reason: Secretions


   Magnesium Oxide [Mag-Ox] 400 mg PO W/BRKFST@0800


   Tamsulosin [Flomax] 0.4 mg PO PC-BRKFST@0800





Discontinued


   bisacodyL [Dulcolax] 10 mg RECTAL Q72H PRN


     PRN Reason: Constipation


   MORPHINE ORAL SOL CONC 20mg/mL [Roxanol Oral Soln Conc 20MG/ML] 5 mg PO Q4H 

PRN


     PRN Reason: Shortness Of Breath/Pain


   Metoprolol Tartrate [Lopressor] 25 mg PO BID@0800,2000


Discharge Medication List





Folic Acid 1 mg PO DAILY@1800 08/16/17 [History]


ALPRAZolam [Xanax] 0.5 mg PO BID PRN 08/09/20 [History]


Cyanocobalamin [Vitamin B-12] 500 mcg PO DAILY@0800 08/09/20 [History]


Donepezil [Aricept] 10 mg PO DAILY@0800 08/09/20 [History]


Ipratropium-Albuterol Nebulize [Duoneb 0.5 mg-3 mg/3 ml Soln] 3 ml INHALATION 

RT-QID PRN 08/09/20 [History]


Pantoprazole [Protonix] 40 mg PO DAILY@0800 08/09/20 [History]


Apixaban [Eliquis] 2.5 mg PO BID@0800,1800 08/02/22 [History]


Cranberry Fruit Extract [Cranberry] 500 mg PO DAILY@0800 08/02/22 [History]


Fluticasone/Vilanterol [Breo Ellipta 100-25 Mcg Inhaler] 1 puff INHALATION RT-

DAILY@0800 08/02/22 [History]


Magnesium Hydroxide [Milk of Magnesia] 2,400 mg PO Q48H PRN 08/02/22 [History]


Mirtazapine [Remeron] 30 mg PO HS@1800 08/02/22 [History]


Pravastatin Sodium [Pravachol] 40 mg PO HS@1800 08/02/22 [History]


Zinc Sulfate [Orazinc] 220 mg PO DAILY@0800 08/02/22 [History]


guaiFENesin SYRUP 100MG/5ML [Robitussin] 200 mg PO Q6H PRN 08/02/22 [History]


Artificial Tears-Hypromellose [Artificial Tear Drops] 2 drops BOTH EYES Q4HR PRN

#1 ml 08/08/22 [Rx]


QUEtiapine [SEROquel] 25 mg PO HS@1800 #30 tab 08/09/22 [Rx]


Ascorbic Acid [Vitamin C] 500 mg PO BID@0800,1800 09/26/22 [History]


Floranex Packet 1 packet PO DAILY@0800 09/26/22 [History]


Hyoscyamine Sulfate [Levsin-Sl] 0.125 mg SL Q4H PRN 09/26/22 [History]


LORazepam [Ativan] 0.5 mg PO Q4H PRN 09/26/22 [History]


Magnesium Oxide [Mag-Ox] 400 mg PO W/BRKFST@0800 09/26/22 [History]


Permethrin 5% Cream [Elimite] 1 applic TOPICAL DAILY@0800 09/26/22 [History]


Tamsulosin [Flomax] 0.4 mg PO PC-BRKFST@0800 09/26/22 [History]


Amiodarone [Cordarone] See Taper PO AS DIRECTED #35 tab 10/03/22 [Rx]


Amoxicillin 500 mg PO TID 7 Days #21 capsule 10/03/22 [Rx]


Ciprofloxacin HCl [Cipro] 500 mg PO BID 7 Days #14 tab 10/03/22 [Rx]


Collagenase [Santyl Ointment] 1 applic TOPICAL DAILY  each 10/03/22 [Rx]


Metoprolol Tartrate [Lopressor] 25 mg PO TID #90 tab 10/03/22 [Rx]








Follow up Appointment(s)/Referral(s): 


Bal Bustillos MD [STAFF PHYSICIAN] - 1 Week


()


Sara Mariscal NPC [Nurse Practitioner] - 10/06/22 1:30 pm ([facility] 

EMS to transfer)


Angelica Alvarez MD [Primary Care Provider] - 3 Days


Patient Instructions/Handouts:  Ciprofloxacin (By mouth), Metoprolol (By mouth),

Amoxicillin (By mouth), Amiodarone (By mouth), Urinary Tract Infection in Men 

(DC)


Activity/Diet/Wound Care/Special Instructions: 


[facility]   send pt wearing his green offloading boots. Joyceyl.


[facility] at [place]





VS q shift











Wound care Daily: left great toe tip, bilateral heels cleanse with sterile 

saline,apply Santyl (edge to edge, a nickel in depth), saline moistened gauze, 

then apply gelfoam dressings.Cover  left great toe with dry gauze.








Must wear green Offloading boots at all times.


Discharge Disposition: TRANSFER TO SNF/ECF